# Patient Record
Sex: FEMALE | Race: WHITE | NOT HISPANIC OR LATINO | Employment: OTHER | URBAN - NONMETROPOLITAN AREA
[De-identification: names, ages, dates, MRNs, and addresses within clinical notes are randomized per-mention and may not be internally consistent; named-entity substitution may affect disease eponyms.]

---

## 2017-07-13 ENCOUNTER — TRANSFERRED RECORDS (OUTPATIENT)
Dept: HEALTH INFORMATION MANAGEMENT | Facility: HOSPITAL | Age: 82
End: 2017-07-13

## 2017-10-24 ENCOUNTER — TRANSFERRED RECORDS (OUTPATIENT)
Dept: HEALTH INFORMATION MANAGEMENT | Facility: HOSPITAL | Age: 82
End: 2017-10-24

## 2017-12-19 RX ORDER — PANTOPRAZOLE SODIUM 40 MG/1
40 TABLET, DELAYED RELEASE ORAL
Status: ON HOLD | COMMUNITY
End: 2019-12-21

## 2017-12-19 RX ORDER — FERROUS SULFATE 325(65) MG
325 TABLET ORAL
Status: ON HOLD | COMMUNITY
End: 2023-06-01

## 2017-12-19 RX ORDER — ATORVASTATIN CALCIUM 20 MG/1
20 TABLET, FILM COATED ORAL DAILY
Status: ON HOLD | COMMUNITY
End: 2022-11-26 | Stop reason: DRUGHIGH

## 2017-12-22 ENCOUNTER — ANESTHESIA EVENT (OUTPATIENT)
Dept: SURGERY | Facility: HOSPITAL | Age: 82
End: 2017-12-22
Payer: MEDICARE

## 2017-12-22 NOTE — ANESTHESIA PREPROCEDURE EVALUATION
Anesthesia Evaluation     . Pt has had prior anesthetic.     No history of anesthetic complications          ROS/MED HX    ENT/Pulmonary:     (+)sleep apnea, AMAURY risk factors snores loudly, hypertension, tobacco use, Past use asthma doesn't use CPAP , . .    Neurologic:  - neg neurologic ROS     Cardiovascular: Comment: Mi 2010  Carotid artery stenosis bilat    (+) Dyslipidemia, hypertension--CAD, --stent,2010  . : . . MOREIRA, . :. .       METS/Exercise Tolerance:  3 - Able to walk 1-2 blocks without stopping   Hematologic:     (+) Anemia, -      Musculoskeletal:  - neg musculoskeletal ROS       GI/Hepatic:     (+) GERD       Renal/Genitourinary:  - ROS Renal section negative       Endo:  - neg endo ROS       Psychiatric:  - neg psychiatric ROS       Infectious Disease:  - neg infectious disease ROS       Malignancy:      - no malignancy   Other:    - neg other ROS                 Physical Exam  Normal systems: cardiovascular and pulmonary    Airway   Mallampati: IV  TM distance: <3 FB  Neck ROM: limited    Dental   (+) upper dentures, partials and missing    Cardiovascular   Rhythm and rate: regular and normal      Pulmonary    breath sounds clear to auscultation                    Anesthesia Plan      History & Physical Review  History and physical reviewed and following examination; no interval change.    ASA Status:  3 .    NPO Status:  > 8 hours    Plan for MAC with Propofol and Intravenous induction. Maintenance will be TIVA.  Reason for MAC:  Deep or markedly invasive procedure (G8) and Procedure to face, neck, head or breast  PONV prophylaxis:  Ondansetron (or other 5HT-3)  Risks and benefits of MAC anesthetic discussed including dental damage, aspiration, loss of airway, conversion to general anesthetic, CV complications, MI, stroke, death. Pt wishes to proceed.       Postoperative Care  Postoperative pain management:  IV analgesics.      Consents  Anesthetic plan, risks, benefits and alternatives discussed  with:  Patient.  Use of blood products discussed: No .   .                          .

## 2017-12-27 ENCOUNTER — HOSPITAL ENCOUNTER (OUTPATIENT)
Facility: HOSPITAL | Age: 82
Discharge: HOME | End: 2017-12-27
Attending: INTERNAL MEDICINE | Admitting: INTERNAL MEDICINE
Payer: MEDICARE

## 2017-12-27 ENCOUNTER — ANESTHESIA (OUTPATIENT)
Dept: SURGERY | Facility: HOSPITAL | Age: 82
End: 2017-12-27
Payer: MEDICARE

## 2017-12-27 VITALS
OXYGEN SATURATION: 94 % | RESPIRATION RATE: 20 BRPM | WEIGHT: 128.8 LBS | SYSTOLIC BLOOD PRESSURE: 125 MMHG | HEART RATE: 75 BPM | TEMPERATURE: 97.5 F | HEIGHT: 59 IN | DIASTOLIC BLOOD PRESSURE: 71 MMHG | BODY MASS INDEX: 25.96 KG/M2

## 2017-12-27 PROCEDURE — 71000027 ZZH RECOVERY PHASE 2 EACH 15 MINS: Performed by: INTERNAL MEDICINE

## 2017-12-27 PROCEDURE — 40000306 ZZH STATISTIC PRE PROC ASSESS II: Performed by: INTERNAL MEDICINE

## 2017-12-27 PROCEDURE — 27210794 ZZH OR GENERAL SUPPLY STERILE: Performed by: INTERNAL MEDICINE

## 2017-12-27 PROCEDURE — 88305 TISSUE EXAM BY PATHOLOGIST: CPT | Mod: TC | Performed by: INTERNAL MEDICINE

## 2017-12-27 PROCEDURE — 25000128 H RX IP 250 OP 636: Performed by: NURSE ANESTHETIST, CERTIFIED REGISTERED

## 2017-12-27 PROCEDURE — 99100 ANES PT EXTEME AGE<1 YR&>70: CPT | Performed by: NURSE ANESTHETIST, CERTIFIED REGISTERED

## 2017-12-27 PROCEDURE — 36000050 ZZH SURGERY LEVEL 2 1ST 30 MIN: Performed by: INTERNAL MEDICINE

## 2017-12-27 PROCEDURE — 37000008 ZZH ANESTHESIA TECHNICAL FEE, 1ST 30 MIN: Performed by: INTERNAL MEDICINE

## 2017-12-27 PROCEDURE — 43239 EGD BIOPSY SINGLE/MULTIPLE: CPT | Performed by: NURSE ANESTHETIST, CERTIFIED REGISTERED

## 2017-12-27 PROCEDURE — 25000125 ZZHC RX 250: Performed by: INTERNAL MEDICINE

## 2017-12-27 RX ORDER — PROPOFOL 10 MG/ML
INJECTION, EMULSION INTRAVENOUS PRN
Status: DISCONTINUED | OUTPATIENT
Start: 2017-12-27 | End: 2017-12-28

## 2017-12-27 RX ORDER — SODIUM CHLORIDE, SODIUM LACTATE, POTASSIUM CHLORIDE, CALCIUM CHLORIDE 600; 310; 30; 20 MG/100ML; MG/100ML; MG/100ML; MG/100ML
INJECTION, SOLUTION INTRAVENOUS CONTINUOUS
Status: DISCONTINUED | OUTPATIENT
Start: 2017-12-27 | End: 2017-12-27 | Stop reason: HOSPADM

## 2017-12-27 RX ORDER — ALBUTEROL SULFATE 0.83 MG/ML
2.5 SOLUTION RESPIRATORY (INHALATION) EVERY 4 HOURS PRN
Status: DISCONTINUED | OUTPATIENT
Start: 2017-12-27 | End: 2017-12-27 | Stop reason: HOSPADM

## 2017-12-27 RX ORDER — ONDANSETRON 4 MG/1
4 TABLET, ORALLY DISINTEGRATING ORAL EVERY 30 MIN PRN
Status: DISCONTINUED | OUTPATIENT
Start: 2017-12-27 | End: 2017-12-27 | Stop reason: HOSPADM

## 2017-12-27 RX ORDER — NALOXONE HYDROCHLORIDE 0.4 MG/ML
.1-.4 INJECTION, SOLUTION INTRAMUSCULAR; INTRAVENOUS; SUBCUTANEOUS
Status: DISCONTINUED | OUTPATIENT
Start: 2017-12-27 | End: 2017-12-27 | Stop reason: HOSPADM

## 2017-12-27 RX ORDER — ONDANSETRON 2 MG/ML
4 INJECTION INTRAMUSCULAR; INTRAVENOUS EVERY 30 MIN PRN
Status: DISCONTINUED | OUTPATIENT
Start: 2017-12-27 | End: 2017-12-27 | Stop reason: HOSPADM

## 2017-12-27 RX ORDER — FENTANYL CITRATE 50 UG/ML
25-50 INJECTION, SOLUTION INTRAMUSCULAR; INTRAVENOUS
Status: DISCONTINUED | OUTPATIENT
Start: 2017-12-27 | End: 2017-12-27 | Stop reason: HOSPADM

## 2017-12-27 RX ORDER — MEPERIDINE HYDROCHLORIDE 25 MG/ML
12.5 INJECTION INTRAMUSCULAR; INTRAVENOUS; SUBCUTANEOUS
Status: DISCONTINUED | OUTPATIENT
Start: 2017-12-27 | End: 2017-12-27 | Stop reason: HOSPADM

## 2017-12-27 RX ORDER — LIDOCAINE 40 MG/G
CREAM TOPICAL
Status: DISCONTINUED | OUTPATIENT
Start: 2017-12-27 | End: 2017-12-27 | Stop reason: HOSPADM

## 2017-12-27 RX ORDER — METOPROLOL TARTRATE 50 MG
50 TABLET ORAL 2 TIMES DAILY
COMMUNITY
End: 2022-02-16

## 2017-12-27 RX ADMIN — PROPOFOL 20 MG: 10 INJECTION, EMULSION INTRAVENOUS at 13:21

## 2017-12-27 RX ADMIN — PROPOFOL 20 MG: 10 INJECTION, EMULSION INTRAVENOUS at 13:23

## 2017-12-27 RX ADMIN — PROPOFOL 20 MG: 10 INJECTION, EMULSION INTRAVENOUS at 13:19

## 2017-12-27 RX ADMIN — PROPOFOL 20 MG: 10 INJECTION, EMULSION INTRAVENOUS at 13:17

## 2017-12-27 RX ADMIN — PROPOFOL 10 MG: 10 INJECTION, EMULSION INTRAVENOUS at 13:25

## 2017-12-27 RX ADMIN — SODIUM CHLORIDE, POTASSIUM CHLORIDE, SODIUM LACTATE AND CALCIUM CHLORIDE: 600; 310; 30; 20 INJECTION, SOLUTION INTRAVENOUS at 12:38

## 2017-12-27 RX ADMIN — PROPOFOL 10 MG: 10 INJECTION, EMULSION INTRAVENOUS at 13:30

## 2017-12-27 RX ADMIN — PROPOFOL 20 MG: 10 INJECTION, EMULSION INTRAVENOUS at 13:15

## 2017-12-27 ASSESSMENT — LIFESTYLE VARIABLES: TOBACCO_USE: 1

## 2017-12-27 NOTE — OR NURSING
Pateint discharged to home .  Rajat score 20. Pain level 0/10.  Discharged from unit via walking .

## 2017-12-27 NOTE — BRIEF OP NOTE
[facility] Brief Op Note    Pre-operative diagnosis: IRON DEFICIENCY ANEMIA   Post-operative diagnosis Angioectasia of the small intestines   Procedure: Procedure(s):  UPPER ENDOSCOPY WITH BIOPSIES, ENTEROSCOPY WITH  SMALL BOWEL ARGON PLASMA COAGULATION - Wound Class: II-Clean Contaminated     Surgeon: Robin Vaughn MD   Anesthesia: Monitor Anesthesia Care    Total IV fluids: (See anesthesia record)   Drains: None   Specimens: Duodenal erosions   Findings: GERD, small intestinal angioectasia   Complications: None   Condition: Stable   Comments: See dictated operative report for full details     Robin Vaughn MD  12/27/2017  1:40 PM

## 2017-12-27 NOTE — H&P
Pre-Endoscopy History and Physical     Frances Gilliam MRN# [id number]   YOB: 1934 Age: 83 year old     Date of Procedure: 12/27/2017  Primary care provider: Theodore Lou  Type of Endoscopy: Esophagogastroduodenoscopy with possible biopsy, possible dilation, possible foreign body removal , Colonoscopy  Reason for Procedure: Anemia of bleeding, Upper Gastrointestinal bleeding, Polyps  Type of Anesthesia Anticipated: MAC    HPI:    Frances is a 83 year old female who will be undergoing the above procedure.      A history and physical has been performed. The patient's medications and allergies have been reviewed. The risks and benefits of the procedure and the sedation options and risks were discussed with the patient.  All questions were answered and informed consent was obtained.      She denies a personal or family history of anesthesia complications or bleeding disorders.     Patient Active Problem List   Diagnosis     Anemia        No past medical history on file.     No past surgical history on file.    Social History   Substance Use Topics     Smoking status: Not on file     Smokeless tobacco: Not on file     Alcohol use Not on file       No family history on file.    Prior to Admission medications    Medication Sig Start Date End Date Taking? Authorizing Provider   METOPROLOL TARTRATE PO Take 50 mg by mouth 2 times daily   Yes Reported, Patient   Calcium Carb-Cholecalciferol (CALCIUM PLUS VITAMIN D3 PO) Take 1 tablet by mouth 2 times daily   Yes Reported, Patient   ATORVASTATIN CALCIUM PO Take 20 mg by mouth daily   Yes Reported, Patient   PANTOPRAZOLE SODIUM PO Take 40 mg by mouth every morning (before breakfast)   Yes Reported, Patient   fluticasone-vilanterol (BREO ELLIPTA) 100-25 MCG/INH oral inhaler Inhale 1 puff into the lungs daily   Yes Reported, Patient   ferrous sulfate (IRON) 325 (65 FE) MG tablet Take 325 mg by mouth daily (with breakfast)   Yes Reported, Patient   ASPIRIN PO Take 81  "mg by mouth   Yes Reported, Patient   NONFORMULARY ICaps takes one capsule daily   Yes Reported, Patient   AMLODIPINE BESYLATE PO Take 5 mg by mouth daily   Yes Reported, Patient   TERAZOSIN HCL PO Take 10 mg by mouth once   Yes Reported, Patient   lactulose 20 GM/30ML SOLN Take 20 mLs by mouth 2 times daily as needed 10/7/16   Asad Layne MD   Nitroglycerin (NITROTAB SL) Place 0.4 mg under the tongue every 5 minutes as needed for chest pain    Reported, Patient       Allergies   Allergen Reactions     Niacin         REVIEW OF SYSTEMS:   5 point ROS negative except as noted above in HPI, including Gen., Resp., CV, GI &  system review.    PHYSICAL EXAM:   /75  Pulse 75  Temp 97.5  F (36.4  C) (Oral)  Resp 20  Ht 1.499 m (4' 11\")  Wt 58.4 kg (128 lb 12.8 oz)  SpO2 92%  BMI 26.01 kg/m2 Estimated body mass index is 26.01 kg/(m^2) as calculated from the following:    Height as of this encounter: 1.499 m (4' 11\").    Weight as of this encounter: 58.4 kg (128 lb 12.8 oz).   GENERAL APPEARANCE: alert, and oriented  MENTAL STATUS: alert  AIRWAY EXAM: Mallampatti Class II (visualization of the soft palate, fauces, and uvula)  RESP: lungs clear to auscultation - no rales, rhonchi or wheezes  CV: regular rates and rhythm  DIAGNOSTICS:    Not indicated    IMPRESSION   ASA Class 2 - Mild systemic disease    PLAN:   Plan for Colonoscopy with possible biopsy, possible polypectomy. We discussed the risks, benefits and alternatives and the patient wished to proceed.    The above has been forwarded to the consulting provider.      Signed Electronically by: Robin Vaughn  December 27, 2017          "

## 2017-12-27 NOTE — DISCHARGE INSTRUCTIONS
UPPER ENDOSCOPY    PURPOSE:  An Upper Endoscopy is a procedure in which the doctor passes a flexible, lighted tube called a gastroscope through your mouth into your stomach in order to:    See the lining of the esophagus, stomach, and duodenum (first part of the small intestine).    Look for bleeding, inflammation (swelling), abnormal tumors or tissue, or ulcers.    Take biopsy specimens.  (Biopsies do not hurt.)    TELL YOUR DOCTOR IF:     You have a heart condition, heart murmur, or have had heart valve surgery.    You have had angioplasty with stents put in within the last year.    You are taking blood thinners - Aspirin, Anti-inflammatory pain pills (like Motrin, ibuprofen, Naproxen, Feldene, Advil, etc.), Coumadin, Plavix.    You have diabetes - contact your regular doctor for management of your insulin.    YOU NEED TO:    Have someone drive you home.  You are not allowed to drive until the next day.  (If you take a taxi, the person staying with you after surgery must ride with you in the taxi.  The  is not responsible for you).    Have someone stay with you for four (4) hours after you leave the hospital.    Know the time to be at admitting:  A nurse will call you with the time the afternoon before your procedure.  If your procedure is on Monday, you will be called on Friday.  If you have not been contacted with the time, call the Admitting Department at [phone] or [phone], ext. [phone] after 5 pm (Admitting is open 24 hours daily).    Talk with the Surgery Patient Education Nurses.  Please call them @ [phone] or toll free [phone] after 8:00 a.m. Monday through Friday.    TO PREPARE FOR THE PROCEDURE:      ONE WEEK BEFORE:    Stop Aspirin, anti-inflammatory pain pills (Motrin, ibuprofen, Naproxen, Feldene, Advil, etc.).  It is OK to take Tylenol (acetaminophen).    Your doctor will tell you what to do if you are on Coumadin or Plavix.     NIGHT BEFORE:    Do not eat or drink  anything after midnight.  Your stomach needs to be empty.     DAY OF YOUR PROCEDURE:    Take your pills you were told to take.  Do not take diabetic pills.  If you are on Insulin, take the dose your doctor told you to take.    Wear loose, comfortable clothing and shoes.    Remove ALL jewelry including wedding rings.  Leave valuables at home.    Come to the hospital Admitting Department located at the [facility] on the lower level.    In Same Day surgery, you will be asked to change into an exam gown.    You will be asked your name, birth date, and what you are having done by every person who is involved with your care.    Your health history, medications, and allergies will be reviewed and verified.    An IV (intravenous line) will be started in your hand.  DURING THE UPPER ENDOSCOPY:    Your doctor and a registered nurse will be with you throughout the procedure which takes about 30 minutes.    You will either lie on your left side or on your back.    Medications will be given to you to help you relax and reduce the gag reflex when swallowing the scope.    Your doctor may need to insert air into your stomach to see better.  This may cause fullness or a cramping sensation.  The air will be removed at the end of the exam.    AFTER THE PROCEDURE    You will return to Same Day Surgery to rest for about an hour before you go home.    The doctor will talk with you and your family.    A family member/friend may visit with you.    You may burp up any air remaining in your stomach.    You may feel dizzy or light-headed from the medicine.    Your nurse will go over the discharge instructions with you and your caregiver and answer any of your questions.      You will be contacted the next day to check on how you are doing.    If biopsies were taken, you will be contacted with the results usually within 3 days.  BACK AT HOME    Rest for an hour or two after you get home.    When your throat is no longer numb and you have a  gag reflex, take a few sips of cool water.  If you can swallow comfortably, you may start eating again.    You may have a mild sore throat for the rest of the day.  WHAT TO WATCH FOR:  Problems rarely occur after the exam, but it is important to be aware of the early signs of a complication.  Call your doctor immediately if you have:    Difficulty swallowing or breathing    Unusual pain in your stomach or chest    Vomiting blood or dark material that looks like coffee grounds    Black or tarry stools    Temperature over 100.6 degrees    MORE QUESTIONS?  Please ask your doctor or nurse before the exam begins  or call your doctor at the clinic.    IF YOU MUST CANCEL YOUR PROCEDURE THE EVENING/NIGHT BEFORE, PLEASE CALL HOSPITAL ADMITTING -[phone] OR TOLL FREE [phone], [phone]. [phone].    Phone Numbers:  Hospital - [phone] [phone]  [facility] - [phone]  Surgery Patient Education - [phone] or toll free [phone]    Post-Anesthesia Patient Instructions    IMMEDIATELY FOLLOWING SURGERY:  Do not drive or operate machinery for the first twenty four hours after surgery.  Do not make any important decisions for twenty four hours after surgery or while taking narcotic pain medications or sedatives.  If you develop intractable nausea and vomiting or a severe headache please notify your doctor immediately.    FOLLOW-UP:  Please make an appointment with your surgeon as instructed. You do not need to follow up with anesthesia unless specifically instructed to do so.    WOUND CARE INSTRUCTIONS (if applicable):  Keep a dry clean dressing on the anesthesia/puncture wound site if there is drainage.  Once the wound has quit draining you may leave it open to air.  Generally you should leave the bandage intact for twenty four hours unless there is drainage.  If the epidural site drains for more than 36-48 hours please call the anesthesia department.    QUESTIONS?:  Please feel free to call your  physician or the hospital  if you have any questions, and they will be happy to assist you.

## 2017-12-27 NOTE — ANESTHESIA POSTPROCEDURE EVALUATION
Patient: Frances Gilliam    Procedure(s):  UPPER ENDOSCOPY WITH BIOPSIES, ENTEROSCOPY WITH  SMALL BOWEL ARGON PLASMA COAGULATION - Wound Class: II-Clean Contaminated      Diagnosis:IRON DEFICIENCY ANEMIA  Diagnosis Additional Information: No value filed.    Anesthesia Type:  MAC    Note:  Anesthesia Post Evaluation    Patient location during evaluation: Phase 2  Patient participation: Able to fully participate in evaluation  Level of consciousness: awake  Pain management: adequate  Airway patency: patent  Cardiovascular status: acceptable  Respiratory status: acceptable  Hydration status: acceptable  PONV: none     Anesthetic complications: None          Last vitals:  Vitals:    12/27/17 1345 12/27/17 1350 12/27/17 1355   BP: 108/61 111/64 124/64   Pulse:      Resp:      Temp:      SpO2: 92% 92% 93%         Electronically Signed By: DARIELA Helton CRNA  December 27, 2017  2:00 PM

## 2017-12-27 NOTE — IP AVS SNAPSHOT
[facility]/Phase II    [address] [address]    Phone:  [phone]                                       After Visit Summary   12/27/2017    Frances Gilliam    MRN: [id number]           After Visit Summary Signature Page     I have received my discharge instructions, and my questions have been answered. I have discussed any challenges I see with this plan with the nurse or doctor.    ..........................................................................................................................................  Patient/Patient Representative Signature      ..........................................................................................................................................  Patient Representative Print Name and Relationship to Patient    ..................................................               ................................................  Date                                            Time    ..........................................................................................................................................  Reviewed by Signature/Title    ...................................................              ..............................................  Date                                                            Time

## 2017-12-27 NOTE — IP AVS SNAPSHOT
MRN:[id number]                      After Visit Summary   12/27/2017    Frances Gilliam    MRN: [id number]           Thank you!     Thank you for choosing [facility] for your care. Our goal is always to provide you with excellent care. Hearing back from our patients is one way we can continue to improve our services. Please take a few minutes to complete the written survey that you may receive in the mail after you visit with us. Thank you!        Patient Information     Date Of Birth          4/4/1934        About your hospital stay     You were admitted on:  December 27, 2017 You last received care in the:  [facility] Preop/Phase II    You were discharged on:  December 27, 2017       Who to Call     For medical emergencies, please call 911.  For non-urgent questions about your medical care, please call your primary care provider or clinic, [phone]  For questions related to your surgery, please call your surgery clinic        Attending Provider     Provider Specialty    Robin Vaughn MD Gastroenterology       Primary Care Provider Office Phone # Fax #    Theodore THOMPSON DO [facility] [phone]      Further instructions from your care team           UPPER ENDOSCOPY    PURPOSE:  An Upper Endoscopy is a procedure in which the doctor passes a flexible, lighted tube called a gastroscope through your mouth into your stomach in order to:    See the lining of the esophagus, stomach, and duodenum (first part of the small intestine).    Look for bleeding, inflammation (swelling), abnormal tumors or tissue, or ulcers.    Take biopsy specimens.  (Biopsies do not hurt.)    TELL YOUR DOCTOR IF:     You have a heart condition, heart murmur, or have had heart valve surgery.    You have had angioplasty with stents put in within the last year.    You are taking blood thinners - Aspirin, Anti-inflammatory pain pills (like Motrin, ibuprofen, Naproxen, Feldene, Advil, etc.), Coumadin, Plavix.    You have diabetes -  contact your regular doctor for management of your insulin.    YOU NEED TO:    Have someone drive you home.  You are not allowed to drive until the next day.  (If you take a taxi, the person staying with you after surgery must ride with you in the taxi.  The  is not responsible for you).    Have someone stay with you for four (4) hours after you leave the hospital.    Know the time to be at admitting:  A nurse will call you with the time the afternoon before your procedure.  If your procedure is on Monday, you will be called on Friday.  If you have not been contacted with the time, call the Admitting Department at [phone] or [phone], ext. [phone] after 5 pm (Admitting is open 24 hours daily).    Talk with the Surgery Patient Education Nurses.  Please call them @ [phone] or toll free [phone] after 8:00 a.m. Monday through Friday.    TO PREPARE FOR THE PROCEDURE:      ONE WEEK BEFORE:    Stop Aspirin, anti-inflammatory pain pills (Motrin, ibuprofen, Naproxen, Feldene, Advil, etc.).  It is OK to take Tylenol (acetaminophen).    Your doctor will tell you what to do if you are on Coumadin or Plavix.     NIGHT BEFORE:    Do not eat or drink anything after midnight.  Your stomach needs to be empty.     DAY OF YOUR PROCEDURE:    Take your pills you were told to take.  Do not take diabetic pills.  If you are on Insulin, take the dose your doctor told you to take.    Wear loose, comfortable clothing and shoes.    Remove ALL jewelry including wedding rings.  Leave valuables at home.    Come to the hospital Admitting Department located at the [facility] on the lower level.    In Same Day surgery, you will be asked to change into an exam gown.    You will be asked your name, birth date, and what you are having done by every person who is involved with your care.    Your health history, medications, and allergies will be reviewed and verified.    An IV (intravenous line) will be started in your  hand.  DURING THE UPPER ENDOSCOPY:    Your doctor and a registered nurse will be with you throughout the procedure which takes about 30 minutes.    You will either lie on your left side or on your back.    Medications will be given to you to help you relax and reduce the gag reflex when swallowing the scope.    Your doctor may need to insert air into your stomach to see better.  This may cause fullness or a cramping sensation.  The air will be removed at the end of the exam.    AFTER THE PROCEDURE    You will return to Same Day Surgery to rest for about an hour before you go home.    The doctor will talk with you and your family.    A family member/friend may visit with you.    You may burp up any air remaining in your stomach.    You may feel dizzy or light-headed from the medicine.    Your nurse will go over the discharge instructions with you and your caregiver and answer any of your questions.      You will be contacted the next day to check on how you are doing.    If biopsies were taken, you will be contacted with the results usually within 3 days.  BACK AT HOME    Rest for an hour or two after you get home.    When your throat is no longer numb and you have a gag reflex, take a few sips of cool water.  If you can swallow comfortably, you may start eating again.    You may have a mild sore throat for the rest of the day.  WHAT TO WATCH FOR:  Problems rarely occur after the exam, but it is important to be aware of the early signs of a complication.  Call your doctor immediately if you have:    Difficulty swallowing or breathing    Unusual pain in your stomach or chest    Vomiting blood or dark material that looks like coffee grounds    Black or tarry stools    Temperature over 100.6 degrees    MORE QUESTIONS?  Please ask your doctor or nurse before the exam begins  or call your doctor at the clinic.    IF YOU MUST CANCEL YOUR PROCEDURE THE EVENING/NIGHT BEFORE, PLEASE CALL HOSPITAL ADMITTING -[phone] OR  "TOLL FREE [phone], EXT. [phone].    Phone Numbers:  [facility] - [phone] [phone]  [facility] - [phone]  Surgery Patient Education - [phone] or toll free [phone]    Post-Anesthesia Patient Instructions    IMMEDIATELY FOLLOWING SURGERY:  Do not drive or operate machinery for the first twenty four hours after surgery.  Do not make any important decisions for twenty four hours after surgery or while taking narcotic pain medications or sedatives.  If you develop intractable nausea and vomiting or a severe headache please notify your doctor immediately.    FOLLOW-UP:  Please make an appointment with your surgeon as instructed. You do not need to follow up with anesthesia unless specifically instructed to do so.    WOUND CARE INSTRUCTIONS (if applicable):  Keep a dry clean dressing on the anesthesia/puncture wound site if there is drainage.  Once the wound has quit draining you may leave it open to air.  Generally you should leave the bandage intact for twenty four hours unless there is drainage.  If the epidural site drains for more than 36-48 hours please call the anesthesia department.    QUESTIONS?:  Please feel free to call your physician or the hospital  if you have any questions, and they will be happy to assist you.       Pending Results     No orders found from 12/25/2017 to 12/28/2017.            Admission Information     Date & Time Provider Department Dept. Phone    12/27/2017 Robin Vaughn MD HI Preop/Phase -[phone]      Your Vitals Were     Blood Pressure Pulse Temperature Respirations Height Weight    107/67 75 97.5  F (36.4  C) (Oral) 20 1.499 m (4' 11\") 58.4 kg (128 lb 12.8 oz)    Pulse Oximetry BMI (Body Mass Index)                92% 26.01 kg/m2          [employer] Information     [employer] lets you send messages to your doctor, view your test results, renew your prescriptions, schedule appointments and more. To sign up, go to [email or web address] . Click " "on \"Log in\" on the left side of the screen, which will take you to the Welcome page. Then click on \"Sign up Now\" on the right side of the page.     You will be asked to enter the access code listed below, as well as some personal information. Please follow the directions to create your username and password.     Your access code is: [email or web address]  Expires: 3/27/2018  1:45 PM     Your access code will  in 90 days. If you need help or a new code, please call your [facility] clinic or [phone].        Care EveryWhere ID     This is your Care EveryWhere ID. This could be used by other organizations to access your [facility] medical records  [id number]        Equal Access to Services     RYDER ESCUDERO : Paola Nieto, wahelder dickinson, qayblashawn kaalkyle lopez, [place] . So [facility] [phone].    ATENCIÓN: Si habla español, tiene a yeboah disposición servicios gratuitos de asistencia lingüística. Llame al [phone].    We comply with applicable federal civil rights laws and [place] laws. We do not discriminate on the basis of race, color, national origin, age, disability, sex, sexual orientation, or gender identity.               Review of your medicines      CONTINUE these medicines which have NOT CHANGED        Dose / Directions    AMLODIPINE BESYLATE PO        Dose:  5 mg   Take 5 mg by mouth daily   Refills:  0       ASPIRIN PO        Dose:  81 mg   Take 81 mg by mouth   Refills:  0       ATORVASTATIN CALCIUM PO        Dose:  20 mg   Take 20 mg by mouth daily   Refills:  0       BREO ELLIPTA 100-25 MCG/INH oral inhaler   Generic drug:  fluticasone-vilanterol        Dose:  1 puff   Inhale 1 puff into the lungs daily   Refills:  0       CALCIUM PLUS VITAMIN D3 PO        Dose:  1 tablet   Take 1 tablet by mouth 2 times daily   Refills:  0       ferrous sulfate 325 (65 FE) MG tablet   Commonly known as:  IRON        Dose:  325 mg   Take 325 mg by mouth daily (with " breakfast)   Refills:  0       lactulose 20 GM/30ML Soln        Dose:  20 mL   Take 20 mLs by mouth 2 times daily as needed   Quantity:  500 mL   Refills:  0       METOPROLOL TARTRATE PO        Dose:  50 mg   Take 50 mg by mouth 2 times daily   Refills:  0       NITROTAB SL        Dose:  0.4 mg   Place 0.4 mg under the tongue every 5 minutes as needed for chest pain   Refills:  0       NONFORMULARY        ICaps takes one capsule daily   Refills:  0       PANTOPRAZOLE SODIUM PO        Dose:  40 mg   Take 40 mg by mouth every morning (before breakfast)   Refills:  0       TERAZOSIN HCL PO        Dose:  10 mg   Take 10 mg by mouth once   Refills:  0                Protect others around you: Learn how to safely use, store and throw away your medicines at www.disposemymeds.org.             Medication List: This is a list of all your medications and when to take them. Check marks below indicate your daily home schedule. Keep this list as a reference.      Medications           Morning Afternoon Evening Bedtime As Needed    AMLODIPINE BESYLATE PO   Take 5 mg by mouth daily                                ASPIRIN PO   Take 81 mg by mouth                                ATORVASTATIN CALCIUM PO   Take 20 mg by mouth daily                                BREO ELLIPTA 100-25 MCG/INH oral inhaler   Inhale 1 puff into the lungs daily   Generic drug:  fluticasone-vilanterol                                CALCIUM PLUS VITAMIN D3 PO   Take 1 tablet by mouth 2 times daily                                ferrous sulfate 325 (65 FE) MG tablet   Commonly known as:  IRON   Take 325 mg by mouth daily (with breakfast)                                lactulose 20 GM/30ML Soln   Take 20 mLs by mouth 2 times daily as needed                                METOPROLOL TARTRATE PO   Take 50 mg by mouth 2 times daily                                NITROTAB SL   Place 0.4 mg under the tongue every 5 minutes as needed for chest pain                                 NONFORMULARY   ICaps takes one capsule daily                                PANTOPRAZOLE SODIUM PO   Take 40 mg by mouth every morning (before breakfast)                                TERAZOSIN HCL PO   Take 10 mg by mouth once

## 2017-12-28 NOTE — ANESTHESIA CARE TRANSFER NOTE
Patient: Frances Gillima    Procedure(s):  UPPER ENDOSCOPY WITH BIOPSIES, ENTEROSCOPY WITH  SMALL BOWEL ARGON PLASMA COAGULATION - Wound Class: II-Clean Contaminated      Diagnosis: IRON DEFICIENCY ANEMIA  Diagnosis Additional Information: No value filed.    Anesthesia Type:   MAC     Note:  Airway :Nasal Cannula  Patient transferred to:Phase II  Handoff Report: Identifed the Patient, Identified the Reponsible Provider, Reviewed the pertinent medical history, Discussed the surgical course, Reviewed Intra-OP anesthesia mangement and issues during anesthesia, Set expectations for post-procedure period and Allowed opportunity for questions and acknowledgement of understanding      Vitals: (Last set prior to Anesthesia Care Transfer)    CRNA VITALS  12/27/2017 1305 - 12/27/2017 1405      12/27/2017             Pulse: 65    Ht Rate: 67    SpO2: 93 %                Electronically Signed By: DARIELA Preston CRNA  December 28, 2017  11:40 AM

## 2017-12-28 NOTE — OP NOTE
DATE OF SERVICE:  2017      PROCEDURE:  Enteroscopy to the jejunum with multiple APCs of angiodysplasia/angioectasia.      INDICATIONS:     1.  Iron deficiency anemia.   2.  History of GI bleeding.      HISTORY OF PRESENT ILLNESS:  Please refer to H&P for further details.      PHYSICAL EXAMINATION:  Cardiopulmonary examination unchanged from previous exam.  Please refer to the H&P for further details.      MEDICATIONS:  MAC per Anesthesia Service.  Monitored parameters within normal limits throughout.      DESCRIPTION OF PROCEDURE:  After informed consent was obtained from Frances Gilliam, a pediatric colonoscope was advanced all the way to the jejunal crest.  Slow withdrawal with some suctioning revealed small patches of angiectasias on the walls of the small bowel.  An APC was used to fulgurate all of the angiodysplastic spots.  Some of the vascular sites started bleeding which were easily rectified with APC.  In the duodenal bulb, there were shallow erosions that were biopsied.  Turning our attention to the stomach, I noted significantly improved gastric mucosa with peptic gastropathy.  A retroflexion view was positive for a small hiatal hernia.  The distal esophagus had a quite passable and patent Schatzki.  The air was then sufflated and the scope was withdrawn fully and completely without any complications.      POSTPROCEDURE DIAGNOSES:   1.  Multiple small bowel angiodysplasias/angioectasias.   2.  Small hiatal hernia.   3.  Shallow erosions/ulcers in the duodenum and the duodenal bulb.   4.  Small Schatzki that was quite patent.      RECOMMENDATIONS:   1.  Repeat enteroscopy in 1 year.   2.  Follow the hemoglobin.   3.  Gastrointestinal clinic followup in 6 months.      Thank you for allowing us to participate in her management.         ROSY MOSS MD             D: 2017 13:46   T: 2017 06:03   MT: CHRISTIANNE      Name:     FRANCES GILLIAM   MRN:      [id number]        Account:        [id number]   :       04/04/1934           Procedure Date: 12/27/2017      Document: [id number]       cc: Theodore Lou DO

## 2017-12-28 NOTE — ADDENDUM NOTE
Addendum  created 12/28/17 1141 by Jonathan Chapman APRN CRNA    Anesthesia Event edited, Procedure Event Log accessed, Sign clinical note

## 2017-12-29 LAB — COPATH REPORT: NORMAL

## 2018-01-03 NOTE — ADDENDUM NOTE
Addendum  created 01/03/18 0814 by Jonathan Chapman APRN CRNA    Anesthesia Event edited, Anesthesia Staff edited, Procedure Event Log accessed

## 2018-03-13 ENCOUNTER — DOCUMENTATION ONLY (OUTPATIENT)
Dept: OTHER | Facility: CLINIC | Age: 83
End: 2018-03-13

## 2018-03-13 PROBLEM — Z71.89 ADVANCED DIRECTIVES, COUNSELING/DISCUSSION: Chronic | Status: ACTIVE | Noted: 2018-03-13

## 2019-05-22 ENCOUNTER — DOCUMENTATION ONLY (OUTPATIENT)
Dept: OTHER | Facility: CLINIC | Age: 84
End: 2019-05-22

## 2019-05-22 PROBLEM — Z71.89 ADVANCED DIRECTIVES, COUNSELING/DISCUSSION: Chronic | Status: RESOLVED | Noted: 2018-03-13 | Resolved: 2019-05-22

## 2019-12-21 ENCOUNTER — APPOINTMENT (OUTPATIENT)
Dept: GENERAL RADIOLOGY | Facility: HOSPITAL | Age: 84
DRG: 185 | End: 2019-12-21
Attending: EMERGENCY MEDICINE
Payer: MEDICARE

## 2019-12-21 ENCOUNTER — HOSPITAL ENCOUNTER (INPATIENT)
Facility: HOSPITAL | Age: 84
LOS: 2 days | Discharge: HOME | DRG: 185 | End: 2019-12-24
Attending: EMERGENCY MEDICINE | Admitting: HOSPITALIST
Payer: MEDICARE

## 2019-12-21 ENCOUNTER — APPOINTMENT (OUTPATIENT)
Dept: CT IMAGING | Facility: HOSPITAL | Age: 84
DRG: 185 | End: 2019-12-21
Attending: EMERGENCY MEDICINE
Payer: MEDICARE

## 2019-12-21 DIAGNOSIS — R09.02 HYPOXIA: ICD-10-CM

## 2019-12-21 DIAGNOSIS — R55 SYNCOPE, UNSPECIFIED SYNCOPE TYPE: ICD-10-CM

## 2019-12-21 DIAGNOSIS — S22.42XA CLOSED FRACTURE OF MULTIPLE RIBS OF LEFT SIDE, INITIAL ENCOUNTER: Primary | ICD-10-CM

## 2019-12-21 PROBLEM — H35.30 MACULAR DEGENERATION: Status: ACTIVE | Noted: 2019-12-21

## 2019-12-21 PROBLEM — S22.49XA MULTIPLE RIB FRACTURES: Status: ACTIVE | Noted: 2019-12-21

## 2019-12-21 PROBLEM — M94.9 DISORDER OF BONE AND CARTILAGE: Status: ACTIVE | Noted: 2019-12-21

## 2019-12-21 PROBLEM — I25.10 CORONARY ATHEROSCLEROSIS: Status: ACTIVE | Noted: 2019-12-21

## 2019-12-21 PROBLEM — I65.29 OCCLUSION AND STENOSIS OF CAROTID ARTERY WITHOUT MENTION OF CEREBRAL INFARCTION: Status: ACTIVE | Noted: 2019-12-21

## 2019-12-21 PROBLEM — M89.9 DISORDER OF BONE AND CARTILAGE: Status: ACTIVE | Noted: 2019-12-21

## 2019-12-21 LAB
ALBUMIN SERPL-MCNC: 3.9 G/DL (ref 3.4–5)
ALBUMIN UR-MCNC: 10 MG/DL
ALP SERPL-CCNC: 102 U/L (ref 40–150)
ALT SERPL W P-5'-P-CCNC: 34 U/L (ref 0–50)
ANION GAP SERPL CALCULATED.3IONS-SCNC: 7 MMOL/L (ref 3–14)
APPEARANCE UR: CLEAR
AST SERPL W P-5'-P-CCNC: 24 U/L (ref 0–45)
BACTERIA #/AREA URNS HPF: ABNORMAL /HPF
BASOPHILS # BLD AUTO: 0 10E9/L (ref 0–0.2)
BASOPHILS NFR BLD AUTO: 0.2 %
BILIRUB SERPL-MCNC: 0.7 MG/DL (ref 0.2–1.3)
BILIRUB UR QL STRIP: NEGATIVE
BUN SERPL-MCNC: 20 MG/DL (ref 7–30)
CALCIUM SERPL-MCNC: 9.3 MG/DL (ref 8.5–10.1)
CHLORIDE SERPL-SCNC: 104 MMOL/L (ref 94–109)
CO2 SERPL-SCNC: 27 MMOL/L (ref 20–32)
COLOR UR AUTO: ABNORMAL
CREAT SERPL-MCNC: 0.78 MG/DL (ref 0.52–1.04)
CRP SERPL-MCNC: <2.9 MG/L (ref 0–8)
D DIMER PPP FEU-MCNC: 3.2 UG/ML FEU (ref 0–0.5)
DIFFERENTIAL METHOD BLD: NORMAL
EOSINOPHIL # BLD AUTO: 0.1 10E9/L (ref 0–0.7)
EOSINOPHIL NFR BLD AUTO: 1.2 %
ERYTHROCYTE [DISTWIDTH] IN BLOOD BY AUTOMATED COUNT: 12.6 % (ref 10–15)
GFR SERPL CREATININE-BSD FRML MDRD: 70 ML/MIN/{1.73_M2}
GLUCOSE SERPL-MCNC: 122 MG/DL (ref 70–99)
GLUCOSE UR STRIP-MCNC: NEGATIVE MG/DL
HCT VFR BLD AUTO: 42 % (ref 35–47)
HGB BLD-MCNC: 14.3 G/DL (ref 11.7–15.7)
HGB UR QL STRIP: NEGATIVE
IMM GRANULOCYTES # BLD: 0.1 10E9/L (ref 0–0.4)
IMM GRANULOCYTES NFR BLD: 1.1 %
KETONES UR STRIP-MCNC: 5 MG/DL
LACTATE BLD-SCNC: 1.4 MMOL/L (ref 0.7–2)
LEUKOCYTE ESTERASE UR QL STRIP: ABNORMAL
LYMPHOCYTES # BLD AUTO: 2 10E9/L (ref 0.8–5.3)
LYMPHOCYTES NFR BLD AUTO: 23.9 %
MCH RBC QN AUTO: 32.2 PG (ref 26.5–33)
MCHC RBC AUTO-ENTMCNC: 34 G/DL (ref 31.5–36.5)
MCV RBC AUTO: 95 FL (ref 78–100)
MONOCYTES # BLD AUTO: 0.4 10E9/L (ref 0–1.3)
MONOCYTES NFR BLD AUTO: 5.2 %
MUCOUS THREADS #/AREA URNS LPF: PRESENT /LPF
NEUTROPHILS # BLD AUTO: 5.8 10E9/L (ref 1.6–8.3)
NEUTROPHILS NFR BLD AUTO: 68.4 %
NITRATE UR QL: NEGATIVE
NRBC # BLD AUTO: 0 10*3/UL
NRBC BLD AUTO-RTO: 0 /100
NT-PROBNP SERPL-MCNC: 396 PG/ML (ref 0–1800)
PH UR STRIP: 6 PH (ref 4.7–8)
PLATELET # BLD AUTO: 174 10E9/L (ref 150–450)
POTASSIUM SERPL-SCNC: 4.1 MMOL/L (ref 3.4–5.3)
PROT SERPL-MCNC: 7.3 G/DL (ref 6.8–8.8)
RBC # BLD AUTO: 4.44 10E12/L (ref 3.8–5.2)
RBC #/AREA URNS AUTO: 4 /HPF (ref 0–2)
SODIUM SERPL-SCNC: 138 MMOL/L (ref 133–144)
SOURCE: ABNORMAL
SP GR UR STRIP: 1.01 (ref 1–1.03)
SQUAMOUS #/AREA URNS AUTO: 10 /HPF (ref 0–1)
TROPONIN I SERPL-MCNC: <0.015 UG/L (ref 0–0.04)
TROPONIN I SERPL-MCNC: <0.015 UG/L (ref 0–0.04)
UROBILINOGEN UR STRIP-MCNC: NORMAL MG/DL (ref 0–2)
WBC # BLD AUTO: 8.5 10E9/L (ref 4–11)
WBC #/AREA URNS AUTO: 3 /HPF (ref 0–5)

## 2019-12-21 PROCEDURE — 40000275 ZZH STATISTIC RCP TIME EA 10 MIN

## 2019-12-21 PROCEDURE — 71101 X-RAY EXAM UNILAT RIBS/CHEST: CPT | Mod: TC,LT

## 2019-12-21 PROCEDURE — 25000128 H RX IP 250 OP 636: Performed by: EMERGENCY MEDICINE

## 2019-12-21 PROCEDURE — 99223 1ST HOSP IP/OBS HIGH 75: CPT | Mod: AI | Performed by: NURSE PRACTITIONER

## 2019-12-21 PROCEDURE — 85379 FIBRIN DEGRADATION QUANT: CPT | Performed by: EMERGENCY MEDICINE

## 2019-12-21 PROCEDURE — 99285 EMERGENCY DEPT VISIT HI MDM: CPT | Mod: 25

## 2019-12-21 PROCEDURE — 99223 1ST HOSP IP/OBS HIGH 75: CPT | Performed by: SURGERY

## 2019-12-21 PROCEDURE — 25000132 ZZH RX MED GY IP 250 OP 250 PS 637: Performed by: NURSE PRACTITIONER

## 2019-12-21 PROCEDURE — 81001 URINALYSIS AUTO W/SCOPE: CPT | Performed by: NURSE PRACTITIONER

## 2019-12-21 PROCEDURE — 93005 ELECTROCARDIOGRAM TRACING: CPT

## 2019-12-21 PROCEDURE — 25500064 ZZH RX 255 OP 636: Performed by: EMERGENCY MEDICINE

## 2019-12-21 PROCEDURE — 83880 ASSAY OF NATRIURETIC PEPTIDE: CPT | Performed by: EMERGENCY MEDICINE

## 2019-12-21 PROCEDURE — 96374 THER/PROPH/DIAG INJ IV PUSH: CPT

## 2019-12-21 PROCEDURE — 25800030 ZZH RX IP 258 OP 636: Performed by: NURSE PRACTITIONER

## 2019-12-21 PROCEDURE — 70450 CT HEAD/BRAIN W/O DYE: CPT | Mod: TC

## 2019-12-21 PROCEDURE — 93010 ELECTROCARDIOGRAM REPORT: CPT | Performed by: INTERNAL MEDICINE

## 2019-12-21 PROCEDURE — 36415 COLL VENOUS BLD VENIPUNCTURE: CPT | Performed by: NURSE PRACTITIONER

## 2019-12-21 PROCEDURE — 84484 ASSAY OF TROPONIN QUANT: CPT | Performed by: NURSE PRACTITIONER

## 2019-12-21 PROCEDURE — 80053 COMPREHEN METABOLIC PANEL: CPT | Performed by: EMERGENCY MEDICINE

## 2019-12-21 PROCEDURE — 83605 ASSAY OF LACTIC ACID: CPT | Performed by: EMERGENCY MEDICINE

## 2019-12-21 PROCEDURE — 99285 EMERGENCY DEPT VISIT HI MDM: CPT | Mod: Z6 | Performed by: EMERGENCY MEDICINE

## 2019-12-21 PROCEDURE — G0378 HOSPITAL OBSERVATION PER HR: HCPCS

## 2019-12-21 PROCEDURE — 84484 ASSAY OF TROPONIN QUANT: CPT | Performed by: EMERGENCY MEDICINE

## 2019-12-21 PROCEDURE — 94375 RESPIRATORY FLOW VOLUME LOOP: CPT

## 2019-12-21 PROCEDURE — 85025 COMPLETE CBC W/AUTO DIFF WBC: CPT | Performed by: EMERGENCY MEDICINE

## 2019-12-21 PROCEDURE — 86140 C-REACTIVE PROTEIN: CPT | Performed by: EMERGENCY MEDICINE

## 2019-12-21 PROCEDURE — 40000786 ZZHCL STATISTIC ACTIVE MRSA SURVEILLANCE CULTURE: Performed by: NURSE PRACTITIONER

## 2019-12-21 PROCEDURE — 71275 CT ANGIOGRAPHY CHEST: CPT | Mod: TC

## 2019-12-21 RX ORDER — KETOROLAC TROMETHAMINE 15 MG/ML
15 INJECTION, SOLUTION INTRAMUSCULAR; INTRAVENOUS ONCE
Status: COMPLETED | OUTPATIENT
Start: 2019-12-21 | End: 2019-12-21

## 2019-12-21 RX ORDER — METOPROLOL TARTRATE 50 MG
50 TABLET ORAL 2 TIMES DAILY
Status: DISCONTINUED | OUTPATIENT
Start: 2019-12-21 | End: 2019-12-24 | Stop reason: HOSPADM

## 2019-12-21 RX ORDER — ONDANSETRON 2 MG/ML
4 INJECTION INTRAMUSCULAR; INTRAVENOUS EVERY 6 HOURS PRN
Status: DISCONTINUED | OUTPATIENT
Start: 2019-12-21 | End: 2019-12-24 | Stop reason: HOSPADM

## 2019-12-21 RX ORDER — ASPIRIN 81 MG/1
81 TABLET ORAL EVERY MORNING
COMMUNITY

## 2019-12-21 RX ORDER — HYDROCODONE BITARTRATE AND ACETAMINOPHEN 5; 325 MG/1; MG/1
1-2 TABLET ORAL EVERY 4 HOURS PRN
Status: DISCONTINUED | OUTPATIENT
Start: 2019-12-21 | End: 2019-12-23

## 2019-12-21 RX ORDER — LIDOCAINE 4 G/G
1 PATCH TOPICAL
Status: DISCONTINUED | OUTPATIENT
Start: 2019-12-21 | End: 2019-12-24 | Stop reason: HOSPADM

## 2019-12-21 RX ORDER — HYDROMORPHONE HYDROCHLORIDE 1 MG/ML
0.3 INJECTION, SOLUTION INTRAMUSCULAR; INTRAVENOUS; SUBCUTANEOUS
Status: DISCONTINUED | OUTPATIENT
Start: 2019-12-21 | End: 2019-12-24 | Stop reason: HOSPADM

## 2019-12-21 RX ORDER — LIDOCAINE 40 MG/G
CREAM TOPICAL
Status: DISCONTINUED | OUTPATIENT
Start: 2019-12-21 | End: 2019-12-24 | Stop reason: HOSPADM

## 2019-12-21 RX ORDER — MULTIVITAMIN WITH IRON
100 TABLET ORAL DAILY
Status: ON HOLD | COMMUNITY
End: 2019-12-21

## 2019-12-21 RX ORDER — ACETAMINOPHEN 325 MG/1
650 TABLET ORAL EVERY 4 HOURS PRN
Status: DISCONTINUED | OUTPATIENT
Start: 2019-12-21 | End: 2019-12-23

## 2019-12-21 RX ORDER — DOCUSATE SODIUM 100 MG/1
100 CAPSULE, LIQUID FILLED ORAL 2 TIMES DAILY
Status: DISCONTINUED | OUTPATIENT
Start: 2019-12-21 | End: 2019-12-24 | Stop reason: HOSPADM

## 2019-12-21 RX ORDER — AMLODIPINE BESYLATE 5 MG/1
5 TABLET ORAL DAILY
Status: DISCONTINUED | OUTPATIENT
Start: 2019-12-22 | End: 2019-12-24 | Stop reason: HOSPADM

## 2019-12-21 RX ORDER — ASPIRIN 81 MG/1
81 TABLET ORAL DAILY
Status: DISCONTINUED | OUTPATIENT
Start: 2019-12-22 | End: 2019-12-24 | Stop reason: HOSPADM

## 2019-12-21 RX ORDER — NALOXONE HYDROCHLORIDE 0.4 MG/ML
.1-.4 INJECTION, SOLUTION INTRAMUSCULAR; INTRAVENOUS; SUBCUTANEOUS
Status: DISCONTINUED | OUTPATIENT
Start: 2019-12-21 | End: 2019-12-24 | Stop reason: HOSPADM

## 2019-12-21 RX ORDER — ACETAMINOPHEN 650 MG/1
650 SUPPOSITORY RECTAL EVERY 4 HOURS PRN
Status: DISCONTINUED | OUTPATIENT
Start: 2019-12-21 | End: 2019-12-23

## 2019-12-21 RX ORDER — ONDANSETRON 4 MG/1
4 TABLET, ORALLY DISINTEGRATING ORAL EVERY 6 HOURS PRN
Status: DISCONTINUED | OUTPATIENT
Start: 2019-12-21 | End: 2019-12-24 | Stop reason: HOSPADM

## 2019-12-21 RX ORDER — SODIUM CHLORIDE 9 MG/ML
INJECTION, SOLUTION INTRAVENOUS CONTINUOUS
Status: DISCONTINUED | OUTPATIENT
Start: 2019-12-21 | End: 2019-12-22

## 2019-12-21 RX ORDER — KETOROLAC TROMETHAMINE 15 MG/ML
15 INJECTION, SOLUTION INTRAMUSCULAR; INTRAVENOUS EVERY 6 HOURS PRN
Status: DISCONTINUED | OUTPATIENT
Start: 2019-12-21 | End: 2019-12-23

## 2019-12-21 RX ADMIN — Medication 1 PATCH: at 20:25

## 2019-12-21 RX ADMIN — SODIUM CHLORIDE: 9 INJECTION, SOLUTION INTRAVENOUS at 16:53

## 2019-12-21 RX ADMIN — DOCUSATE SODIUM 100 MG: 100 CAPSULE, LIQUID FILLED ORAL at 20:23

## 2019-12-21 RX ADMIN — FLUTICASONE FUROATE AND VILANTEROL TRIFENATATE 1 PUFF: 100; 25 POWDER RESPIRATORY (INHALATION) at 20:25

## 2019-12-21 RX ADMIN — IOHEXOL 75 ML: 350 INJECTION, SOLUTION INTRAVENOUS at 14:07

## 2019-12-21 RX ADMIN — KETOROLAC TROMETHAMINE 15 MG: 15 INJECTION, SOLUTION INTRAMUSCULAR; INTRAVENOUS at 13:20

## 2019-12-21 RX ADMIN — METOPROLOL TARTRATE 50 MG: 50 TABLET, FILM COATED ORAL at 20:23

## 2019-12-21 ASSESSMENT — ENCOUNTER SYMPTOMS
HEADACHES: 0
COLOR CHANGE: 0
SHORTNESS OF BREATH: 0
CONFUSION: 0
ABDOMINAL PAIN: 0
FEVER: 0
ARTHRALGIAS: 0
EYE REDNESS: 0
NECK STIFFNESS: 0
DIFFICULTY URINATING: 0

## 2019-12-21 ASSESSMENT — MIFFLIN-ST. JEOR: SCORE: 970.24

## 2019-12-21 NOTE — H&P
[facility]    History and Physical  Hospitalist       Date of Admission:  12/21/2019  Date of Service (when I saw the patient): 12/21/19    Assessment & Plan       Closed fracture of multiple ribs of left side: Trauma consult by Dr. Merrill. She noted immediate left rib pain when she awoke on the floor. She denies any other pain, there are no other visible injures. She was able to ambulate after the fall without problems with exception of worsening rib pain.      Syncope: True syncope with loss of consciousness reportedly of 1-2 minutes. She denies any symptoms prior to syncope, felt fine upon waking with exception of the left rib pain. Will get EKG, UA, place on cardiac monitor. She does have a cardiac history including MI and stents placed 10 years ago and aortic valve disease. Will get ECHO. She does report some ongoing fatigue over the last several months but no prior syncope.      Asthma: Moderate. Well controlled on Breo. She denies use of rescue inhaler. She does have increased risk of poor outcome with these rib fractures with asthma but fortunately it is well controlled.       Hypertension: On norvasc and metoprolol, pressures have been well controlled in the ED and at last clinic visit. Will continue home doses.       Panic disorder: Calm, she is not on any long term controller medications for this.       CAD (coronary artery disease): Stent 10 years ago. At that time she did have an MI. She has not had any cardiac problems since that time.       DVT Prophylaxis: Pneumatic Compression Devices  Code Status: Full Code    Disposition: Expected discharge in 1-3 days once pain controlled, pulmonary toilet adequate, cardiac monitoring x24 hours .    Daniela Red, CNP    Primary Care Physician   Theodore Lou    Chief Complaint   Syncope, rib pain    History is obtained from the patient    History of Present Illness   Frances Gilliam is a 85 year old female who presents with syncope and rib  pain. She was sitting at a table playing cards with her friends when she suddenly fell over the side of her chair onto the left side. Friends with her stated she was unresponsive for only a minute or 2. When she became responsive she felt fine and was able to get up from the floor with the help of her friends. She reports she has otherwise felt fine, though a bit more tired over the last several months as reported to her PCP. She has been eating and drinking well. She has never had syncope before. She has notice some mild peripheral edema-worse on the right than left that improves overnight. She has not had any significant dyspnea, chest pain, dizziness, abdominal pain or nausea.   Once getting up she noted left rib pain. She was brought to her PCP's clinic urgent care and was sent over to the ED. Through this she was able to ambulate independently though moving was painful to the left ribs. Plain films revealed multiple rib fractures, d-dimer was elevated so CT scan of her chest was completed, negative for PE. Non-contrast head CT was negative for acute hemorrhage.     Past Medical History    I have reviewed this patient's medical history and updated it with pertinent information if needed.   Past Medical History:   Diagnosis Date     Age-related macular degeneration      Aortic valve sclerosis      CAD (coronary artery disease)      Essential thrombocytosis (H)      GI hemorrhage      History of MI (myocardial infarction)      Hypertension      Iron deficiency anemia      Mixed hyperlipidemia      Peripheral vascular disease (H)      Vitamin B 12 deficiency      Vitamin D deficiency        Past Surgical History   I have reviewed this patient's surgical history and updated it with pertinent information if needed.  Past Surgical History:   Procedure Laterality Date     ESOPHAGOSCOPY, GASTROSCOPY, DUODENOSCOPY (EGD), COMBINED N/A 12/27/2017    Procedure: COMBINED ESOPHAGOSCOPY, GASTROSCOPY, DUODENOSCOPY (EGD);  UPPER  ENDOSCOPY WITH BIOPSIES, ENTEROSCOPY WITH  SMALL BOWEL ARGON PLASMA COAGULATION;  Surgeon: Robin Vaughn MD;  Location: [facility]     ID PATIENT HAS A CORONARY ARTERY STENT         Prior to Admission Medications   Prior to Admission Medications   Prescriptions Last Dose Informant Patient Reported? Taking?   Calcium Carb-Cholecalciferol (CALCIUM PLUS VITAMIN D3 PO) 12/21/2019 at Unknown time  Yes Yes   Sig: Take 1 tablet by mouth 2 times daily   Multiple Vitamins-Minerals (ICAPS PO) 12/21/2019  Yes Yes   Sig: Take 1 capsule by mouth daily   Nitroglycerin (NITROTAB SL) More than a month at Unknown time  Yes No   Sig: Place 0.4 mg under the tongue every 5 minutes as needed for chest pain   amLODIPine (NORVASC) 5 MG tablet 12/21/2019 at Unknown time  Yes Yes   Sig: Take 5 mg by mouth daily    aspirin 81 MG EC tablet 12/21/2019 at Unknown  Yes Yes   Sig: Take 81 mg by mouth daily   atorvastatin (LIPITOR) 20 MG tablet 12/20/2019 at Unknown time  Yes Yes   Sig: Take 20 mg by mouth daily    ferrous sulfate (IRON) 325 (65 FE) MG tablet 12/21/2019 at Unknown time  Yes Yes   Sig: Take 325 mg by mouth daily (with breakfast)   fluticasone-vilanterol (BREO ELLIPTA) 100-25 MCG/INH oral inhaler 12/20/2019 at Unknown time  Yes Yes   Sig: Inhale 1 puff into the lungs daily   metoprolol tartrate (LOPRESSOR) 50 MG tablet 12/21/2019 at Unknown time  Yes Yes   Sig: Take 50 mg by mouth 2 times daily    pantoprazole (PROTONIX) 40 MG EC tablet 12/20/2019 at Unknown time  Yes Yes   Sig: Take 40 mg by mouth every morning (before breakfast)    terazosin (HYTRIN) 10 MG capsule 12/21/2019 at Unknown time  Yes Yes   Sig: Take 10 mg by mouth daily    vitamin B-12 (CYANOCOBALAMIN) 100 MCG tablet   Yes Yes   Sig: Take 100 mcg by mouth daily      Facility-Administered Medications: None     Allergies   Allergies   Allergen Reactions     Niacin        Social History   I have reviewed this patient's social history and updated it with pertinent information  if needed. Frances Gilliam  reports that she quit smoking about 52 years ago. Her smoking use included cigarettes. She does not have any smokeless tobacco history on file.    Family History   I have reviewed this patient's family history and updated it with pertinent information if needed.   Family History   Problem Relation Age of Onset     Heart Disease Father        Review of Systems   CONSTITUTIONAL:  positive for  fatigue  negative for  fevers, chills, sweats, malaise, anorexia and weight loss  HEENT:  negative for  earaches, nasal congestion and sore throat  RESPIRATORY:  positive for  wheezing  negative for  dry cough, cough with sputum, dyspnea and hemoptysis  CARDIOVASCULAR:  positive for  chest pain, dyspnea, palpitations, orthopnea, exertional chest pressure/discomfort  negative for  fatigue, edema, syncope  GASTROINTESTINAL:  negative for nausea, vomiting, change in bowel habits and abdominal pain  GENITOURINARY:  positive for frequency  negative for dysuria, urinary incontinence and hematuria  MUSCULOSKELETAL:  negative for  myalgias, arthralgias and muscle weakness. Positive for left rib pain.  NEUROLOGICAL:  negative for headaches, dizziness, coordination problems, weakness and numbness      Physical Exam   Temp: 98.1  F (36.7  C) Temp src: Oral BP: 150/67 Pulse: 80 Heart Rate: 62 Resp: 20 SpO2: 96 % O2 Device: Nasal cannula Oxygen Delivery: 2 LPM  Vital Signs with Ranges  Temp:  [97.7  F (36.5  C)-98.1  F (36.7  C)] 98.1  F (36.7  C)  Pulse:  [68-80] 80  Heart Rate:  [62-71] 62  Resp:  [14-24] 20  BP: (136-156)/(61-70) 150/67  SpO2:  [87 %-99 %] 96 %  0 lbs 0 oz    Constitutional: Awake,alert, no acute distress at rest.   HEENT: Normocephalic, mucous membranes pink and moist. No cervical lymphadenopathy.  Respiratory: Clear bilaterally, no wheezes, crackles or rhonchi. She does have limited expansion due to pain.  Cardiovascular: HRR, + murmur, 1+ pitting edema bilateral lower legs.   GI:  Soft,nontender, bowel sounds positive.  Skin: No rashes, open areas or unusual bruising.   Musculoskeletal: Left rib pain with palpation, no pain to hips, knees or shoulders.   Neurologic: AOx3, no deficits noted.       Data   Data reviewed today:  I personally reviewed .  Recent Labs   Lab 12/21/19  1313   WBC 8.5   HGB 14.3   MCV 95         POTASSIUM 4.1   CHLORIDE 104   CO2 27   BUN 20   CR 0.78   ANIONGAP 7   CATARINA 9.3   *   ALBUMIN 3.9   PROTTOTAL 7.3   BILITOTAL 0.7   ALKPHOS 102   ALT 34   AST 24   TROPI <0.015       Recent Results (from the past 24 hour(s))   CT Head w/o Contrast    Narrative    PROCEDURE: CT HEAD W/O CONTRAST     HISTORY: TIA, initial exam.    COMPARISON: None.    TECHNIQUE:  Helical images of the head from the foramen magnum to the  vertex were obtained without contrast.    FINDINGS: The ventricles and sulci are prominent, compatible with  mild, generalized volume loss. No acute intracranial hemorrhage, mass  effect, midline shift, hydrocephalus or basilar cystern effacement are  present.    The grey-white matter interface is preserved. Patchy hypoattenuation  within the supratentorial subcortical and periventricular white matter  is most compatible with microvascular ischemic change.     The calvarium is intact.  A slightly irregular filling defect is  present in the left maxillary sinus.      Impression    IMPRESSION: No acute intracranial hemorrhage or CT evidence of  transcortical ischemia.    Heterogeneous left maxillary sinus filling defect. Recommend follow-up  CT sinus with contrast , ideally in a few weeks.    VÍCTOR AMIN MD   Ribs XR, unilat 3 views + PA chest,  left    Narrative    PROCEDURE:  XR RIBS & CHEST LT 3VW    HISTORY: Left-sided rib pain post fall, .    COMPARISON:  11/15/2016    FINDINGS:  The cardiomediastinal contours are stable.  The trachea is midline.   There is calcific aortic atherosclerosis.  Interstitial thickening is chronic.  Costochondral calcifications are  present. No focal consolidation, effusion or pneumothorax.    There are acute fractures of the left fourth, fifth and sixth ribs  posteriorly.      Impression    IMPRESSION:      Acute fractures of the left fourth, fifth and sixth ribs posteriorly.    VÍCTOR AMIN MD   CT Chest (PE) Abdomen Pelvis w Contrast    Narrative    PROCEDURE:  CT CHEST PE ABDOMEN PELVIS W CONTRAST.    HISTORY:  Evaluate for pulmonary embolism.  Chest-abd-pelvis trauma,  moderate, blunt; Chest pain post trauma with elevated d-dimer.    TECHNIQUE:  Initial pre-contrast  and localizer images were  obtained.  Contrast enhanced helical CT pulmonary angiography was then  performed.  Helical CT images of the abdomen and pelvis were  performed. Routine transaxial and post-processed (multiplanar and/or  MIP) reformations were obtained.    COMPARISON:  11/15/2016    MEDS/CONTRAST: OMNIPAQUE 350 100 mL    PULMONARY CTA FINDINGS:  This is a diagnostic quality helical CT  pulmonary angiogram.  There is no acute pulmonary embolism to the  first subsegmental pulmonary artery level. The main pulmonary artery  is dilated.    OTHER FINDINGS:      There is a low-density right thyroid nodule measuring 4 cm resulting  in slight right to left midline shift of trachea. The heart is  enlarged. There is a moderate hiatal hernia.    Trace bilateral effusions are present. No pneumothorax is present. The  central airways are unremarkable. No focal consolidation or  intrapulmonary mass is identified.    Acute fractures of the left posterior fourth, fifth, sixth and seventh  ribs are seen.    Liver, gallbladder, pancreas, spleen and adrenal glands demonstrate no  evidence of trauma or other acute abnormality. The infrarenal aorta is  dilated up to 3.3 cm. Symmetric nephrograms are present without  hydronephrosis.      Impression    IMPRESSION:    No acute pulmonary emboli to the subsegmental level.    Cardiomegaly. Pulmonary  hypertension.    Acute posterior left fourth, fifth, sixth and seventh rib fractures.  No pneumothorax.    No evidence of intra-abdominal organ blunt injury.    VÍCTOR AMIN MD

## 2019-12-21 NOTE — CONSULTS
Patient being admitted to medicine for syncope and resulting left sided rib fractures 4-7.  Images reviewed of CT head, chest, abdomen,pelvis reviewed and positive for the rib fractures, a left maxillary sinus filling defect and a right thyroid nodule.  I've been consulted for the trauma.  Respiratory is seeing the patient for FVC measurement.  Aggressive pulmonary toilet, pain control as part of the multiple rib fracture protocol will be followed. Formal tertiary to follow later.

## 2019-12-21 NOTE — DISCHARGE INSTRUCTIONS
What to expect when you have contrast    During your exam, we will inject  contrast  into your vein or artery. (Contrast is a clear liquid with iodine in it. It shows up on X-rays.)    You may feel warm or hot. You may have a metal taste in your mouth and a slight upset stomach. You may also feel pressure near the kidneys and bladder. These effects will last about 1 to 3 minutes.    Please tell us if you have:    Sneezing     Itching    Hives     Swelling in the face    A hoarse voice    Breathing problems    Other new symptoms    Serious problems are rare.  They may include:    Irregular heartbeat     Seizures    Kidney failure              Tissue damage    Shock      Death    If you have any problems during the exam, we  will treat them right away.    When you get home    Call your hospital if you have any new symptoms in the next 2 days, like hives or swelling. (Phone numbers are at the bottom of this page.) Or call your family doctor.     If you have wheezing or trouble breathing, call 911.    Self-care  -Drink at least 4 extra glasses of water today.   This reduces the stress on your kidneys.  -Keep taking your regular medicines.    The contrast will pass out of your body in your  Urine(pee). This will happen in the next 24 hours. You  will not feel this. Your urine will not  change color.    If you have kidney problems or take metformin    Drink 4 to 8 large glasses of water for the next  2 days, if you are not on a fluid restriction.    ?If you take metformin (Glucophage or Glucovance) for diabetes, keep taking it.      ?Your kidney function tests are abnormal.  If you take Metformin, do not take it for 48 hours. Please go to your clinic for a blood test within 3 days after your exam before the restarting this medicine.     (Note to provider:please give patient prescription for lab tests.)    ?Special instructions:     I have read and understand the above information.    Patient Sign  Here:______________________________________Date:________Time:______    Staff Sign Here:________________________________________Date:_______Time:______      Radiology Departments:     ?[facility]: [phone] ?[place]: [phone]     ?[place]: [phone] ?[place]:[phone]      ?[place]: [phone]  ?[place]: [phone]  ?[place]:[phone]    ?[facility]:[phone]  ?[facility]:[phone]

## 2019-12-21 NOTE — PLAN OF CARE
PTA med list reviewed with [facility] med list, chart review and [facility] report review. Updated appropriate strengths and verified frequencies. Added in Vit B-12. Deleted a bunch of outside meds from 2014 (was on crestor, omeprazole, mag ox, metoprolol, fish oil, lexapro and lorazepam- no recent records of these meds).    Protonix is not from [facility] so if she isn't getting it from another pharmacy, it may be an OTC. Needs verification with patient.     Will speak to patient when she gets admitted otherwise nurse can complete PTA medlist and I will check it in the morning.     Marcia Arnett on 12/21/2019 at 3:42 PM

## 2019-12-21 NOTE — ED PROVIDER NOTES
History     Chief Complaint   Patient presents with     Loss of Consciousness     Chest Pain     HPI  Frances Gilliam is a 85 year old female who presents to the emergency department accompanied by the son.  Patient had a syncopal episode while playing cards with her friend earlier this morning at [employer].  She is suddenly lost consciousness and stooped forward falling down but was able to wake up after approximately 2 minutes.  Presents now with complaints of left-sided rib pains.  Pain is rated as 10/10, left side of chest wall, does not radiate, aggravated by palpation and deep inspiration.  No fever, chills, palpitations.  Denies unilateral body weakness, visual changes or headache.    Allergies:  Allergies   Allergen Reactions     Niacin Rash       Problem List:    Patient Active Problem List    Diagnosis Date Noted     Coronary atherosclerosis 12/21/2019     Priority: Medium     Disorder of bone and cartilage 12/21/2019     Priority: Medium     Hyperlipidemia 12/21/2019     Priority: Medium     Hypertension 12/21/2019     Priority: Medium     Macular degeneration 12/21/2019     Priority: Medium     Occlusion and stenosis of carotid artery without mention of cerebral infarction 12/21/2019     Priority: Medium     Overview:   with 50-75% stenoses of left, right internal carotid arteries, documented by   US 5/15/02 and 06/16/04       Closed fracture of multiple ribs of left side 12/21/2019     Priority: Medium     Syncope 12/21/2019     Priority: Medium     Multiple rib fractures 12/21/2019     Priority: Medium     Vitamin D deficiency      Priority: Medium     Vitamin B 12 deficiency      Priority: Medium     Peripheral vascular disease (H)      Priority: Medium     Iron deficiency anemia      Priority: Medium     History of MI (myocardial infarction)      Priority: Medium     GI hemorrhage      Priority: Medium     Essential thrombocytosis (H)      Priority: Medium     CAD (coronary artery disease)       Priority: Medium     Aortic valve sclerosis      Priority: Medium     Anemia 2016     Priority: Medium     Anxiety about health 2014     Priority: Medium     Panic disorder 2014     Priority: Medium     Asthma 10/11/2011     Priority: Medium     Lumbago 2006     Priority: Medium     IMO Update 10/11       Sacroiliitis, not elsewhere classified (H) 2006     Priority: Medium     IMO Update 10/11          Past Medical History:    Past Medical History:   Diagnosis Date     Age-related macular degeneration      Aortic valve sclerosis      CAD (coronary artery disease)      Essential thrombocytosis (H)      GI hemorrhage      History of MI (myocardial infarction)      Hypertension      Iron deficiency anemia      Mixed hyperlipidemia      Peripheral vascular disease (H)      Vitamin B 12 deficiency      Vitamin D deficiency        Past Surgical History:    Past Surgical History:   Procedure Laterality Date     ESOPHAGOSCOPY, GASTROSCOPY, DUODENOSCOPY (EGD), COMBINED N/A 2017    Procedure: COMBINED ESOPHAGOSCOPY, GASTROSCOPY, DUODENOSCOPY (EGD);  UPPER ENDOSCOPY WITH BIOPSIES, ENTEROSCOPY WITH  SMALL BOWEL ARGON PLASMA COAGULATION;  Surgeon: Robin Vaughn MD;  Location: [facility]     [place] PATIENT HAS A CORONARY ARTERY STENT         Family History:    Family History   Problem Relation Age of Onset     Heart Disease Father        Social History:  Marital Status:   [5]  Social History     Tobacco Use     Smoking status: Former Smoker     Types: Cigarettes     Last attempt to quit: 1968     Years since quittin.0   Substance Use Topics     Alcohol use: None     Drug use: None        Medications:    No current outpatient medications on file.        Review of Systems   Constitutional: Negative for fever.   HENT: Negative for congestion.    Eyes: Negative for redness.   Respiratory: Negative for shortness of breath.    Cardiovascular: Positive for chest pain.   Gastrointestinal: Negative  "for abdominal pain.   Genitourinary: Negative for difficulty urinating.   Musculoskeletal: Negative for arthralgias and neck stiffness.   Skin: Negative for color change.   Neurological: Negative for headaches.   Psychiatric/Behavioral: Negative for confusion.   All other systems reviewed and are negative.      Physical Exam   BP: 156/63  Pulse: 71  Heart Rate: 71  Temp: 97.7  F (36.5  C)  Resp: 24  Height: 149.9 cm (4' 11\")  Weight: 62 kg (136 lb 9.6 oz)  SpO2: (!) 87 %      Physical Exam  Vitals signs and nursing note reviewed.   Constitutional:       General: She is in acute distress.      Appearance: She is well-developed. She is not diaphoretic.   HENT:      Head: Atraumatic.      Mouth/Throat:      Pharynx: No oropharyngeal exudate.   Eyes:      General: No scleral icterus.     Pupils: Pupils are equal, round, and reactive to light.   Cardiovascular:      Heart sounds: Normal heart sounds.   Pulmonary:      Effort: No respiratory distress.      Breath sounds: Normal breath sounds.   Chest:       Abdominal:      General: Bowel sounds are normal.      Palpations: Abdomen is soft.      Tenderness: There is no abdominal tenderness.   Musculoskeletal:         General: No tenderness.   Skin:     General: Skin is warm.      Findings: No rash.   Neurological:      Mental Status: She is alert.         ED Course   Evaluated upon arrival and CT scan head ordered.  Laboratory tests ordered including left rib x-rays.  Toradol IV given for pain control.       Procedures    Results for orders placed or performed during the hospital encounter of 12/21/19 (from the past 24 hour(s))   CBC with platelets differential   Result Value Ref Range    WBC 8.5 4.0 - 11.0 10e9/L    RBC Count 4.44 3.8 - 5.2 10e12/L    Hemoglobin 14.3 11.7 - 15.7 g/dL    Hematocrit 42.0 35.0 - 47.0 %    MCV 95 78 - 100 fl    MCH 32.2 26.5 - 33.0 pg    MCHC 34.0 31.5 - 36.5 g/dL    RDW 12.6 10.0 - 15.0 %    Platelet Count 174 150 - 450 10e9/L    Diff Method " Automated Method     % Neutrophils 68.4 %    % Lymphocytes 23.9 %    % Monocytes 5.2 %    % Eosinophils 1.2 %    % Basophils 0.2 %    % Immature Granulocytes 1.1 %    Nucleated RBCs 0 0 /100    Absolute Neutrophil 5.8 1.6 - 8.3 10e9/L    Absolute Lymphocytes 2.0 0.8 - 5.3 10e9/L    Absolute Monocytes 0.4 0.0 - 1.3 10e9/L    Absolute Eosinophils 0.1 0.0 - 0.7 10e9/L    Absolute Basophils 0.0 0.0 - 0.2 10e9/L    Abs Immature Granulocytes 0.1 0 - 0.4 10e9/L    Absolute Nucleated RBC 0.0    D dimer quantitative   Result Value Ref Range    D Dimer 3.2 (H) 0.0 - 0.50 ug/ml FEU   Comprehensive metabolic panel   Result Value Ref Range    Sodium 138 133 - 144 mmol/L    Potassium 4.1 3.4 - 5.3 mmol/L    Chloride 104 94 - 109 mmol/L    Carbon Dioxide 27 20 - 32 mmol/L    Anion Gap 7 3 - 14 mmol/L    Glucose 122 (H) 70 - 99 mg/dL    Urea Nitrogen 20 7 - 30 mg/dL    Creatinine 0.78 0.52 - 1.04 mg/dL    GFR Estimate 70 >60 mL/min/[1.73_m2]    GFR Estimate If Black 81 >60 mL/min/[1.73_m2]    Calcium 9.3 8.5 - 10.1 mg/dL    Bilirubin Total 0.7 0.2 - 1.3 mg/dL    Albumin 3.9 3.4 - 5.0 g/dL    Protein Total 7.3 6.8 - 8.8 g/dL    Alkaline Phosphatase 102 40 - 150 U/L    ALT 34 0 - 50 U/L    AST 24 0 - 45 U/L   Troponin I   Result Value Ref Range    Troponin I ES <0.015 0.000 - 0.045 ug/L   Lactic acid whole blood   Result Value Ref Range    Lactic Acid 1.4 0.7 - 2.0 mmol/L   Nt probnp inpatient (BNP)   Result Value Ref Range    N-Terminal Pro BNP Inpatient 396 0 - 1,800 pg/mL   CRP inflammation   Result Value Ref Range    CRP Inflammation <2.9 0.0 - 8.0 mg/L   CT Head w/o Contrast    Narrative    PROCEDURE: CT HEAD W/O CONTRAST     HISTORY: TIA, initial exam.    COMPARISON: None.    TECHNIQUE:  Helical images of the head from the foramen magnum to the  vertex were obtained without contrast.    FINDINGS: The ventricles and sulci are prominent, compatible with  mild, generalized volume loss. No acute intracranial hemorrhage,  mass  effect, midline shift, hydrocephalus or basilar cystern effacement are  present.    The grey-white matter interface is preserved. Patchy hypoattenuation  within the supratentorial subcortical and periventricular white matter  is most compatible with microvascular ischemic change.     The calvarium is intact.  A slightly irregular filling defect is  present in the left maxillary sinus.      Impression    IMPRESSION: No acute intracranial hemorrhage or CT evidence of  transcortical ischemia.    Heterogeneous left maxillary sinus filling defect. Recommend follow-up  CT sinus with contrast , ideally in a few weeks.    VÍCTOR AMIN MD   Ribs XR, unilat 3 views + PA chest,  left    Narrative    PROCEDURE:  XR RIBS & CHEST LT 3VW    HISTORY: Left-sided rib pain post fall, .    COMPARISON:  11/15/2016    FINDINGS:  The cardiomediastinal contours are stable.  The trachea is midline.   There is calcific aortic atherosclerosis.  Interstitial thickening is chronic. Costochondral calcifications are  present. No focal consolidation, effusion or pneumothorax.    There are acute fractures of the left fourth, fifth and sixth ribs  posteriorly.      Impression    IMPRESSION:      Acute fractures of the left fourth, fifth and sixth ribs posteriorly.    VÍCTOR AMIN MD   CT Chest (PE) Abdomen Pelvis w Contrast    Narrative    PROCEDURE:  CT CHEST PE ABDOMEN PELVIS W CONTRAST.    HISTORY:  Evaluate for pulmonary embolism.  Chest-abd-pelvis trauma,  moderate, blunt; Chest pain post trauma with elevated d-dimer.    TECHNIQUE:  Initial pre-contrast  and localizer images were  obtained.  Contrast enhanced helical CT pulmonary angiography was then  performed.  Helical CT images of the abdomen and pelvis were  performed. Routine transaxial and post-processed (multiplanar and/or  MIP) reformations were obtained.    COMPARISON:  11/15/2016    MEDS/CONTRAST: OMNIPAQUE 350 100 mL    PULMONARY CTA FINDINGS:  This is a  diagnostic quality helical CT  pulmonary angiogram.  There is no acute pulmonary embolism to the  first subsegmental pulmonary artery level. The main pulmonary artery  is dilated.    OTHER FINDINGS:      There is a low-density right thyroid nodule measuring 4 cm resulting  in slight right to left midline shift of trachea. The heart is  enlarged. There is a moderate hiatal hernia.    Trace bilateral effusions are present. No pneumothorax is present. The  central airways are unremarkable. No focal consolidation or  intrapulmonary mass is identified.    Acute fractures of the left posterior fourth, fifth, sixth and seventh  ribs are seen.    Liver, gallbladder, pancreas, spleen and adrenal glands demonstrate no  evidence of trauma or other acute abnormality. The infrarenal aorta is  dilated up to 3.3 cm. Symmetric nephrograms are present without  hydronephrosis.      Impression    IMPRESSION:    No acute pulmonary emboli to the subsegmental level.    Cardiomegaly. Pulmonary hypertension.    Acute posterior left fourth, fifth, sixth and seventh rib fractures.  No pneumothorax.    No evidence of intra-abdominal organ blunt injury.    VÍCTOR AMIN MD   Active MRSA Surveillance Culture   Result Value Ref Range    Specimen Description Nares     Culture Micro Culture in progress    UA with Microscopic reflex to Culture   Result Value Ref Range    Color Urine Light Yellow     Appearance Urine Clear     Glucose Urine Negative NEG^Negative mg/dL    Bilirubin Urine Negative NEG^Negative    Ketones Urine 5 (A) NEG^Negative mg/dL    Specific Gravity Urine 1.010 1.003 - 1.035    Blood Urine Negative NEG^Negative    pH Urine 6.0 4.7 - 8.0 pH    Protein Albumin Urine 10 (A) NEG^Negative mg/dL    Urobilinogen mg/dL Normal 0.0 - 2.0 mg/dL    Nitrite Urine Negative NEG^Negative    Leukocyte Esterase Urine Small (A) NEG^Negative    Source Midstream Urine     WBC Urine 3 0 - 5 /HPF    RBC Urine 4 (H) 0 - 2 /HPF    Bacteria Urine  None (A) NEG^Negative /HPF    Squamous Epithelial /HPF Urine 10 (H) 0 - 1 /HPF    Mucous Urine Present (A) NEG^Negative /LPF   Troponin I   Result Value Ref Range    Troponin I ES <0.015 0.000 - 0.045 ug/L   Basic metabolic panel   Result Value Ref Range    Sodium 140 133 - 144 mmol/L    Potassium 3.6 3.4 - 5.3 mmol/L    Chloride 107 94 - 109 mmol/L    Carbon Dioxide 26 20 - 32 mmol/L    Anion Gap 7 3 - 14 mmol/L    Glucose 88 70 - 99 mg/dL    Urea Nitrogen 16 7 - 30 mg/dL    Creatinine 0.64 0.52 - 1.04 mg/dL    GFR Estimate 81 >60 mL/min/[1.73_m2]    GFR Estimate If Black >90 >60 mL/min/[1.73_m2]    Calcium 8.1 (L) 8.5 - 10.1 mg/dL   CBC with platelets differential   Result Value Ref Range    WBC 6.8 4.0 - 11.0 10e9/L    RBC Count 3.84 3.8 - 5.2 10e12/L    Hemoglobin 12.5 11.7 - 15.7 g/dL    Hematocrit 36.3 35.0 - 47.0 %    MCV 95 78 - 100 fl    MCH 32.6 26.5 - 33.0 pg    MCHC 34.4 31.5 - 36.5 g/dL    RDW 12.8 10.0 - 15.0 %    Platelet Count 138 (L) 150 - 450 10e9/L    Diff Method Automated Method     % Neutrophils 73.2 %    % Lymphocytes 17.6 %    % Monocytes 7.6 %    % Eosinophils 1.0 %    % Basophils 0.3 %    % Immature Granulocytes 0.3 %    Nucleated RBCs 0 0 /100    Absolute Neutrophil 4.9 1.6 - 8.3 10e9/L    Absolute Lymphocytes 1.2 0.8 - 5.3 10e9/L    Absolute Monocytes 0.5 0.0 - 1.3 10e9/L    Absolute Eosinophils 0.1 0.0 - 0.7 10e9/L    Absolute Basophils 0.0 0.0 - 0.2 10e9/L    Abs Immature Granulocytes 0.0 0 - 0.4 10e9/L    Absolute Nucleated RBC 0.0        Medications   ketorolac (TORADOL) injection 15 mg (has no administration in time range)   Lidocaine (LIDOCARE) 4 % Patch 1 patch (1 patch Transdermal Patch/Med Applied 12/21/19 2025)     And   lidocaine patch in PLACE ( Transdermal Patch in Place 12/22/19 5934)   acetaminophen (TYLENOL) tablet 650 mg (has no administration in time range)   acetaminophen (TYLENOL) Suppository 650 mg (has no administration in time range)   naloxone (NARCAN) injection  0.1-0.4 mg (has no administration in time range)   melatonin tablet 1 mg (has no administration in time range)   ondansetron (ZOFRAN-ODT) ODT tab 4 mg (has no administration in time range)     Or   ondansetron (ZOFRAN) injection 4 mg (has no administration in time range)   lidocaine 1 % 0.1-1 mL (has no administration in time range)   lidocaine (LMX4) kit (has no administration in time range)   sodium chloride (PF) 0.9% PF flush 3 mL (has no administration in time range)   sodium chloride (PF) 0.9% PF flush 3 mL (3 mLs Intracatheter Not Given 12/22/19 0218)   sodium chloride 0.9% infusion ( Intravenous New Bag 12/22/19 0442)   HYDROcodone-acetaminophen (NORCO) 5-325 MG per tablet 1-2 tablet (1 tablet Oral Given 12/22/19 0006)   HYDROmorphone (PF) (DILAUDID) injection 0.3 mg (has no administration in time range)   docusate sodium (COLACE) capsule 100 mg (100 mg Oral Given 12/21/19 2023)   amLODIPine (NORVASC) tablet 5 mg (has no administration in time range)   aspirin EC tablet 81 mg (has no administration in time range)   fluticasone-vilanterol (BREO ELLIPTA) 100-25 MCG/INH inhaler 1 puff (1 puff Inhalation Given 12/21/19 2025)   metoprolol tartrate (LOPRESSOR) tablet 50 mg (50 mg Oral Given 12/21/19 2023)   ketorolac (TORADOL) injection 15 mg (15 mg Intravenous Given 12/21/19 1320)   iohexol (OMNIPAQUE) 350 mg/mL solution 75 mL (75 mLs Intravenous Given 12/21/19 1407)   sodium chloride (PF) 0.9% PF flush 100 mL (100 mLs Intravenous Given 12/21/19 1407)       Assessments & Plan (with Medical Decision Making)   Traumatic multiple rib fractures on the left: Patient presented to the ED with left-sided rib pains after a syncopal episode while playing cards with friends.  The incident happened 1 hour prior to arrival at the ED.  Patient was hypoxic upon arrival to the ED with oxygen saturation of 88% room air and was immediately started on oxygen per nasal cannula.  Laboratory test done showed normal CBC, CMP, troponin,  urinalysis, elevated d-dimer but CTA was negative for PE.  CT head did not reveal any acute changes.  CT chest showed multiple rib fractures on the left posteriorly including fourth, fifth, sixth and seventh ribs.  Forced vital capacity was 700 mL's.  Dr. Merrill trauma surgeon on-call was consulted and will review patient within 18 hours of admission.  Patient will be admitted under the care of Dr. Higgins.  Started on incentive spirometry and IV Toradol for pain control.  Patient and relatives were in agreement with treatment plan and patient was subsequently admitted to the hospital.    I have reviewed the nursing notes.    I have reviewed the findings, diagnosis, plan and need for follow up with the patient.    Current Discharge Medication List          Final diagnoses:   Closed fracture of multiple ribs of left side, initial encounter   Hypoxia       12/21/2019   [facility]     Graham Traylor MD  12/22/19 0828

## 2019-12-21 NOTE — PLAN OF CARE
[facility] Inpatient Admission Note:    Patient admitted to 3112/3112-1 at approximately 1610 via cart accompanied by transport tech from emergency room . Report received from ALYSSA Macias in SBAR format at 1601 via telephone. Patient transferred to bed via slide board.. Patient is alert and oriented X 3, reports pain; rates at 1 on 0-10 scale. 10 with movement.  Patient oriented to room, unit, hourly rounding, and plan of care. Explained admission packet and patient handbook with patient bill of rights brochure. Will continue to monitor and document as needed.     Inpatient Nursing criteria listed below was met:    Health care directives status obtained and documented: Yes    Care Everywhere authorization obtained No    MRSA swab completed for patient 65 years and older: Yes    Patient identifies a surrogate decision maker: No      If initial lactic acid >2.0, repeat lactic acid drawn within one hour of arrival to unit: NA. If no, state reason: NA    Vaccination assessment and education completed: Yes   Vaccinations received prior to admission: Pneumovax yes  Influenza(seasonal)  YES   Vaccination(s) ordered: not given today because already received    Clergy visit ordered if patient requests: Yes; pt declined.    Skin issues/needs documented: Yes    Isolation Patient: no Education given, correct sign in place and documentation row added to PCS:  NA    Fall Prevention Yes: Care plan updated, education given and documented, sticker and magnet in place: Yes    Care Plan initiated: Yes    Education Documented (including assessment): Yes    Patient has discharge needs : Yes If yes, please explain: pain control

## 2019-12-21 NOTE — ED TRIAGE NOTES
"Presents with reports of LOC and R sided chest pain. Patient states an hour ago she was playing cards \"and I toppled over.\" Denies being dizzy prior to incident. States she was \"out for a few seconds, my girl friends got me up.\" GCS 15 on arrival - denies dizziness. Denies neck pain. Denies headache. Negative BEFAST. States R sided chest pain began after fall. States it \"hurts to breath.\" Rates pain 10/10. Patient guarding R side of trunk. Denies hitting R side of chest during fall.  States a hx of MI with one stent placement in 2010  "

## 2019-12-22 LAB
ANION GAP SERPL CALCULATED.3IONS-SCNC: 7 MMOL/L (ref 3–14)
BASOPHILS # BLD AUTO: 0 10E9/L (ref 0–0.2)
BASOPHILS NFR BLD AUTO: 0.3 %
BUN SERPL-MCNC: 16 MG/DL (ref 7–30)
CALCIUM SERPL-MCNC: 8.1 MG/DL (ref 8.5–10.1)
CHLORIDE SERPL-SCNC: 107 MMOL/L (ref 94–109)
CO2 SERPL-SCNC: 26 MMOL/L (ref 20–32)
CREAT SERPL-MCNC: 0.64 MG/DL (ref 0.52–1.04)
DIFFERENTIAL METHOD BLD: ABNORMAL
EOSINOPHIL # BLD AUTO: 0.1 10E9/L (ref 0–0.7)
EOSINOPHIL NFR BLD AUTO: 1 %
ERYTHROCYTE [DISTWIDTH] IN BLOOD BY AUTOMATED COUNT: 12.8 % (ref 10–15)
GFR SERPL CREATININE-BSD FRML MDRD: 81 ML/MIN/{1.73_M2}
GLUCOSE SERPL-MCNC: 88 MG/DL (ref 70–99)
HCT VFR BLD AUTO: 36.3 % (ref 35–47)
HGB BLD-MCNC: 12.5 G/DL (ref 11.7–15.7)
IMM GRANULOCYTES # BLD: 0 10E9/L (ref 0–0.4)
IMM GRANULOCYTES NFR BLD: 0.3 %
LYMPHOCYTES # BLD AUTO: 1.2 10E9/L (ref 0.8–5.3)
LYMPHOCYTES NFR BLD AUTO: 17.6 %
MCH RBC QN AUTO: 32.6 PG (ref 26.5–33)
MCHC RBC AUTO-ENTMCNC: 34.4 G/DL (ref 31.5–36.5)
MCV RBC AUTO: 95 FL (ref 78–100)
MONOCYTES # BLD AUTO: 0.5 10E9/L (ref 0–1.3)
MONOCYTES NFR BLD AUTO: 7.6 %
NEUTROPHILS # BLD AUTO: 4.9 10E9/L (ref 1.6–8.3)
NEUTROPHILS NFR BLD AUTO: 73.2 %
NRBC # BLD AUTO: 0 10*3/UL
NRBC BLD AUTO-RTO: 0 /100
PLATELET # BLD AUTO: 138 10E9/L (ref 150–450)
POTASSIUM SERPL-SCNC: 3.6 MMOL/L (ref 3.4–5.3)
RBC # BLD AUTO: 3.84 10E12/L (ref 3.8–5.2)
SODIUM SERPL-SCNC: 140 MMOL/L (ref 133–144)
WBC # BLD AUTO: 6.8 10E9/L (ref 4–11)

## 2019-12-22 PROCEDURE — G0378 HOSPITAL OBSERVATION PER HR: HCPCS

## 2019-12-22 PROCEDURE — 25000132 ZZH RX MED GY IP 250 OP 250 PS 637: Performed by: NURSE PRACTITIONER

## 2019-12-22 PROCEDURE — 94799 UNLISTED PULMONARY SVC/PX: CPT

## 2019-12-22 PROCEDURE — 25800030 ZZH RX IP 258 OP 636: Performed by: NURSE PRACTITIONER

## 2019-12-22 PROCEDURE — 85025 COMPLETE CBC W/AUTO DIFF WBC: CPT | Performed by: NURSE PRACTITIONER

## 2019-12-22 PROCEDURE — 12000000 ZZH R&B MED SURG/OB

## 2019-12-22 PROCEDURE — 40000275 ZZH STATISTIC RCP TIME EA 10 MIN

## 2019-12-22 PROCEDURE — 99232 SBSQ HOSP IP/OBS MODERATE 35: CPT | Performed by: NURSE PRACTITIONER

## 2019-12-22 PROCEDURE — 36415 COLL VENOUS BLD VENIPUNCTURE: CPT | Performed by: NURSE PRACTITIONER

## 2019-12-22 PROCEDURE — 96376 TX/PRO/DX INJ SAME DRUG ADON: CPT

## 2019-12-22 PROCEDURE — 80048 BASIC METABOLIC PNL TOTAL CA: CPT | Performed by: NURSE PRACTITIONER

## 2019-12-22 PROCEDURE — 25000128 H RX IP 250 OP 636: Performed by: NURSE PRACTITIONER

## 2019-12-22 PROCEDURE — 99232 SBSQ HOSP IP/OBS MODERATE 35: CPT | Performed by: SURGERY

## 2019-12-22 RX ADMIN — METOPROLOL TARTRATE 50 MG: 50 TABLET, FILM COATED ORAL at 20:05

## 2019-12-22 RX ADMIN — DOCUSATE SODIUM 100 MG: 100 CAPSULE, LIQUID FILLED ORAL at 20:05

## 2019-12-22 RX ADMIN — KETOROLAC TROMETHAMINE 15 MG: 15 INJECTION, SOLUTION INTRAMUSCULAR; INTRAVENOUS at 10:22

## 2019-12-22 RX ADMIN — HYDROCODONE BITARTRATE AND ACETAMINOPHEN 1 TABLET: 5; 325 TABLET ORAL at 00:06

## 2019-12-22 RX ADMIN — SODIUM CHLORIDE: 9 INJECTION, SOLUTION INTRAVENOUS at 04:42

## 2019-12-22 RX ADMIN — FLUTICASONE FUROATE AND VILANTEROL TRIFENATATE 1 PUFF: 100; 25 POWDER RESPIRATORY (INHALATION) at 10:22

## 2019-12-22 RX ADMIN — ASPIRIN 81 MG: 81 TABLET, COATED ORAL at 08:28

## 2019-12-22 RX ADMIN — METOPROLOL TARTRATE 50 MG: 50 TABLET, FILM COATED ORAL at 08:29

## 2019-12-22 RX ADMIN — KETOROLAC TROMETHAMINE 15 MG: 15 INJECTION, SOLUTION INTRAMUSCULAR; INTRAVENOUS at 16:41

## 2019-12-22 RX ADMIN — Medication 1 PATCH: at 20:05

## 2019-12-22 RX ADMIN — AMLODIPINE BESYLATE 5 MG: 5 TABLET ORAL at 08:29

## 2019-12-22 RX ADMIN — DOCUSATE SODIUM 100 MG: 100 CAPSULE, LIQUID FILLED ORAL at 08:29

## 2019-12-22 ASSESSMENT — ACTIVITIES OF DAILY LIVING (ADL)
ADLS_ACUITY_SCORE: 17
ADLS_ACUITY_SCORE: 17

## 2019-12-22 NOTE — PLAN OF CARE
Face to face report given with opportunity to observe patient.    Report given to Kaelyn HOWE RN.    Senait Cabrera RN   12/21/2019  11:26 PM

## 2019-12-22 NOTE — PLAN OF CARE
"Reason for hospital stay:  L sided multiple rib fracture  Living situation PTA: Home-alone  Most recent vitals: /65   Pulse 73   Temp 98.7  F (37.1  C) (Tympanic)   Resp 20   Ht 1.499 m (4' 11\")   Wt 62 kg (136 lb 9.6 oz)   SpO2 (!) 91%   BMI 27.59 kg/m      Pain Management:  Pt reports pain increasing at beginning of shift- reports pain is 4/10 and L anterior/posterior rib cage area, Given Norco 5 mg with absence of nonverbal indicators on pain follow up. Pt reports pain has decreased-2/10 and is tolerable at 0430.   LOC:  A&O x4  Cardiac:  Apical pulse regular, no edema present  Respiratory:  Posterior lungs diminished, clear, equal bilaterally. Dyspnea on exertion, no shortness of breath, infrequent non-productive cough--splinted  GI:  Bowel sounds active and audible all four quadrants, Denies N/V. Pt sats dropping to 80's encouraged deep breaths- did not increase. O2 increased to 2 LPM- sats remaining above 90%.   :  Voiding adequately  Skin Issues:  No open areas or bruising noted    IVF:  NS 75 ml/hr    Nutrition: Regular   ADL's:  Independent  Ambulation: Assist of 1 gait belt/walker  Safety:  Call light within reach     Comments:    Face to face report given with opportunity to observe patient.    Report given to Melodie Rivas   12/22/2019  7:37 AM      "

## 2019-12-22 NOTE — PLAN OF CARE
Vitals: VSS. On 1L, sats in mid to low 90's. On RA, sats dip to mid 80's.     Pain: Pain rated 2/10 to left rib cage. Treating with Toradol.     Orientation: A&O.    Cardiac: Tele reading SR 60's per AVB per ICU    Lungs: Clear. Shallow breathing due to rib fx.     ABD: Bowels Active.     Urinating: WNL    Skin: Scattered bruising.     IV fluids: SL.    Antibiotics: IV none    Mobility: Up with stand by assistance. Walker and gait belt used. Weaker on left side due to pain.      Safety: Bed/chair alarm on. Call light in reach. Rounding done.    Comment: Slept well throughout night. Up in chair for most of shift. Visitor present over lunch. Currently napping.     Face to face report given with opportunity to observe patient.    Report given to Senait Prado RN   12/22/2019  3:39 PM

## 2019-12-22 NOTE — CONSULTS
Surgery Consult Note      : 1934    MELCHOR: Dec 21, 2019      Chief Complaint:  Left sided rib pain    History of Present Illness:  Mrs. Gilliam is a very pleasant 85 year old female, who I am seeing at the request of Daniela Red for evaluation of trauma. She was playing cards with her friends this afternoon when she suffered a syncopal event and fell to the floor with LOC.  Images as I described in prior note.  She's admitted for multiple rib fractures 4-7 on the left and as work up for syncope.        PRIMARY:  A: Patent, patient protecting  B: CTA B/L, equal chest rise  C: No external bleeding, 2+ right radial pulse  D: GCS 15, answers questions appropriately, moving all extremities, eyes open  E: Hospital gown on.    /58   Pulse 73   Temp 98.4  F (36.9  C) (Tympanic)   Resp 20   SpO2 94%     SECONDARY:   Head: NCAT, sitting up playing scratchers with her sons  EYE: pupils 3mm PERRL, EMOI  Nares: clear  Mouth: no blood in oral pharynx or missing teeth  Face: no sinus tenderness or zygomatic instability  Neck: no supraclavicular crepitus, no JVD, trachea midline  Chest: Tender to palpation L flank without chest wall deformity  Heart:: RRR  Abd: Soft, ND/NT no rebound, no guarding, pelvis stable  UE: No long bone deformity, MS 5/5, sensation grossly intact  LE: No long bone deformity, MS 5/5, sensation grossly intact  Spine: No step offs, no midline tenderness    Medical history:  Past Medical History:   Diagnosis Date     Age-related macular degeneration      Aortic valve sclerosis      CAD (coronary artery disease)      Essential thrombocytosis (H)      GI hemorrhage      History of MI (myocardial infarction)      Hypertension      Iron deficiency anemia      Mixed hyperlipidemia      Peripheral vascular disease (H)      Vitamin B 12 deficiency      Vitamin D deficiency        Surgical history:  Past Surgical History:   Procedure Laterality Date     ESOPHAGOSCOPY, GASTROSCOPY, DUODENOSCOPY (EGD),  COMBINED N/A 2017    Procedure: COMBINED ESOPHAGOSCOPY, GASTROSCOPY, DUODENOSCOPY (EGD);  UPPER ENDOSCOPY WITH BIOPSIES, ENTEROSCOPY WITH  SMALL BOWEL ARGON PLASMA COAGULATION;  Surgeon: Robin Vaughn MD;  Location: [facility]     CA PATIENT HAS A CORONARY ARTERY STENT         Family history:  Family History   Problem Relation Age of Onset     Heart Disease Father        Medications:  Prior to Admission medications    Medication Sig Start Date End Date Taking? Authorizing Provider   amLODIPine (NORVASC) 5 MG tablet Take 5 mg by mouth daily    Yes Reported, Patient   aspirin 81 MG EC tablet Take 81 mg by mouth daily   Yes Reported, Patient   atorvastatin (LIPITOR) 20 MG tablet Take 20 mg by mouth daily    Yes Reported, Patient   Calcium Carb-Cholecalciferol (CALCIUM PLUS VITAMIN D3 PO) Take 1 tablet by mouth 2 times daily   Yes Reported, Patient   ferrous sulfate (IRON) 325 (65 FE) MG tablet Take 325 mg by mouth daily (with breakfast)   Yes Reported, Patient   fluticasone-vilanterol (BREO ELLIPTA) 100-25 MCG/INH oral inhaler Inhale 1 puff into the lungs daily   Yes Reported, Patient   metoprolol tartrate (LOPRESSOR) 50 MG tablet Take 50 mg by mouth 2 times daily    Yes Reported, Patient   Multiple Vitamins-Minerals (ICAPS PO) Take 1 capsule by mouth daily   Yes Reported, Patient   terazosin (HYTRIN) 10 MG capsule Take 10 mg by mouth daily    Yes Reported, Patient   Nitroglycerin (NITROTAB SL) Place 0.4 mg under the tongue every 5 minutes as needed for chest pain    Reported, Patient       Allergies:  The patientis allergic to niacin.  .  Social history:  Social History     Tobacco Use     Smoking status: Former Smoker     Types: Cigarettes     Last attempt to quit: 1968     Years since quittin.0   Substance Use Topics     Alcohol use: Not on file     Marital status: .    Review of Systems:    Constitutional: Negative for fever, chills and weight loss.   HENT: Negative for ear pain, nosebleeds,  congestion, sore throat, tinnitus and ear discharge.    Eyes: Negative for blurred vision, double vision, photophobia and pain.   Respiratory: Per HPI  Cardiovascular: Negative for chest pain, palpitations and orthopnea.   Gastrointestinal: Negative for heartburn, nausea, vomiting, abdominal pain and blood in stool.   Genitourinary: Negative for urgency, frequency and hematuria.   Musculoskeletal: Negative for myalgias, back pain and joint pain.   Neurological: Negative for tingling, speech change and headaches.   Endo/Heme/Allergies: Does not bruise/bleed easily.   Psychiatric/Behavioral: Negative for depression, suicidal ideas and hallucinations. The patient is not nervous/anxious.    A/P  #1 Syncope   #2 Left ribs 4-7 fractures    Multimodality pain control, aggressive pulmonary toilet.  She's only needing 2 L of supplemental O2 via NC and this has come down already to 1.5 since arriving on the floor.  Work up for syncope per primary

## 2019-12-22 NOTE — PROGRESS NOTES
S: HD #2 Syncope and multiple left sided rib fractures.  No overnight events, no acute issues, hemodynamically stable, afebrile, oxygen weaned down to 1 Liter.  States pain is controlled.    O:  Temp: 98.7  F (37.1  C) Temp  Min: 97.7  F (36.5  C)  Max: 98.7  F (37.1  C)  Resp: 20 Resp  Min: 14  Max: 24  SpO2: 93 % SpO2  Min: 87 %  Max: 99 %  Heart Rate: 73 Heart Rate  Min: 62  Max: 80  BP: 157/65 Systolic (24hrs), Av , Min:136 , Max:165   Diastolic (24hrs), Av, Min:58, Max:70  Gen: sleeping, easily awaken, NAD, laying comfortably in bed  Lungs: CTA B/L without wheezes  Heart: RRR no r/g/m    A/P  #1 Multiple left rib fractures    Instructed the patient on the importance of incentive spirometry.  She states she's using it 10x/hr.  We'll continue to wean the oxygen as appropriate.  Continue to watch for decompensation.  No indication for paravertebral block.  Surgery will continue to follow.

## 2019-12-22 NOTE — PLAN OF CARE
VSS. RR 20 at rest. Increases with exertion. Pt dyspneic with exertion due to rib fractures. BP slightly elevated, per norm. Scheduled BP meds administered. SaO2 mid 90s on O2. Pt arrived from ED on 2LPM. Writer weaned down to 1LPM. SaO2 in mid 90s at rest and dips into high 80s with movement. Pt c/o pain rated 1/10 at rest but stated with movement, she is at a 10/10.     When pt arrived to floor, pt attempted to get out of bed and ambulate self to bed but was unable to. Staff assisted/used slide board. For first void, pt agreed to use bedpan and tolerated well. Pt declined need for pain medications at rest. Scheduled lidocaine patch applied to L posterior ribs. At bedtime, pt agreed to try to get out of bed and use commode. Pt got out of bed Ax1 with a walker and tolerated well.     Lungs: clear but diminished. IS use encouraged. Abd: soft and nontender. Active bowel sounds. Diet: Regular diet tolerated without difficulty but pt didn't have much of an appetite and ate 50% of her soup. : pt continent of urine. Pt up Ax1.    PIV infusing NS at 75 mL/hr. Call light within reach. Pt calls appropriately for assistance.

## 2019-12-22 NOTE — PROGRESS NOTES
[facility]    Hospitalist Progress Note    Date of Service (when I saw the patient): 12/22/2019    Assessment & Plan     Closed fracture of multiple ribs of left side: Trauma consult by Dr. Merrill. She noted immediate left rib pain when she awoke on the floor. She denies any other pain, there are no other visible injures. She was able to ambulate after the fall without problems with exception of worsening rib pain.  -12/22: States pain well controlled with hydrocodone. Still requiring some oxygen though titrated down to 1 liter. Continue flutter/IS/CDB. Lidocaine, toradol and oral narcotics for pain control.        Syncope: True syncope with loss of consciousness reportedly of 1-2 minutes. She denies any symptoms prior to syncope, felt fine upon waking with exception of the left rib pain. Will get EKG, UA, place on cardiac monitor. She does have a cardiac history including MI and stents placed 10 years ago and aortic valve disease. Will get ECHO when available. She does report some ongoing fatigue over the last several months but no prior syncope.  -12/22 No ectopy on telemetry overnight. Blood pressures have been stable. Troponin negative x2. No acute changes on EKG. Will get ECHO in AM. Continue telemetry.        Asthma: Moderate. Well controlled on Breo. She denies use of rescue inhaler. She does have increased risk of poor outcome with these rib fractures with asthma but fortunately it is well controlled. Continue Breo and PRN albuterol.        Hypertension: On norvasc and metoprolol, pressures have been well controlled in the ED and at last clinic visit. Will continue home doses.        Panic disorder: Calm, she is not on any long term controller medications for this.        CAD (coronary artery disease): Stent 10 years ago. At that time she did have an MI. She has not had any cardiac problems since that time.      DVT Prophylaxis: Pneumatic Compression Devices  Code Status: Full  Code    Disposition: Expected discharge in 1-2 days once pain controlled, tolerating RA.    Daniela Red, CNP    Interval History   Awake,alert.  Denies feeling dyspneic, no chest pain, rib pain tolerable with pain medications. Denies abdominal pain or nausea.     -Data reviewed today: I reviewed all new labs and imaging results over the last 24 hours.     Physical Exam   Temp: 98.2  F (36.8  C) Temp src: Tympanic BP: 156/62 Pulse: 73 Heart Rate: 71 Resp: 20 SpO2: 92 % O2 Device: Nasal cannula Oxygen Delivery: 2 LPM  Vitals:    12/21/19 2035   Weight: 62 kg (136 lb 9.6 oz)     Vital Signs with Ranges  Temp:  [97.7  F (36.5  C)-98.7  F (37.1  C)] 98.2  F (36.8  C)  Pulse:  [68-80] 73  Heart Rate:  [62-80] 71  Resp:  [14-24] 20  BP: (136-165)/(58-70) 156/62  SpO2:  [87 %-99 %] 92 %  I/O last 3 completed shifts:  In: 1134 [P.O.:240; I.V.:894]  Out: 1275 [Urine:1275]    Peripheral IV 12/27/17 Right Hand (Active)   Number of days: 725       Peripheral IV 12/21/19 Left (Active)   Site Assessment WDL 12/22/2019  8:30 AM   Line Status Infusing 12/22/2019  8:30 AM   Phlebitis Scale 0-->no symptoms 12/22/2019  8:30 AM   Infiltration Scale 0 12/22/2019  8:30 AM   Infiltration Site Treatment Method  None 12/21/2019  5:00 PM   Extravasation? No 12/21/2019  5:00 PM   Number of days: 1     Line/device assessment(s) completed for medical necessity    Constitutional: Awake,alert, no acute distress  Respiratory: Diminished left mid-base, otherwise clear. Weak forced cough with encouragement.   Cardiovascular: HRR, + murmur, no peripheral edema.   GI: Soft,nontnder, bowel sounds hypoactive  Skin/Integumen: no bruising, rashes or open areas.        Medications     sodium chloride 75 mL/hr at 12/22/19 0442       amLODIPine  5 mg Oral Daily     aspirin  81 mg Oral Daily     docusate sodium  100 mg Oral BID     fluticasone-vilanterol  1 puff Inhalation Daily     lidocaine  1 patch Transdermal Q24h    And     lidocaine   Transdermal  Q8H     metoprolol tartrate  50 mg Oral BID     sodium chloride (PF)  3 mL Intracatheter Q8H       Data   Recent Labs   Lab 12/22/19  0513 12/21/19  1801 12/21/19  1313   WBC 6.8  --  8.5   HGB 12.5  --  14.3   MCV 95  --  95   *  --  174     --  138   POTASSIUM 3.6  --  4.1   CHLORIDE 107  --  104   CO2 26  --  27   BUN 16  --  20   CR 0.64  --  0.78   ANIONGAP 7  --  7   CATARINA 8.1*  --  9.3   GLC 88  --  122*   ALBUMIN  --   --  3.9   PROTTOTAL  --   --  7.3   BILITOTAL  --   --  0.7   ALKPHOS  --   --  102   ALT  --   --  34   AST  --   --  24   TROPI  --  <0.015 <0.015       Recent Results (from the past 24 hour(s))   CT Head w/o Contrast    Narrative    PROCEDURE: CT HEAD W/O CONTRAST     HISTORY: TIA, initial exam.    COMPARISON: None.    TECHNIQUE:  Helical images of the head from the foramen magnum to the  vertex were obtained without contrast.    FINDINGS: The ventricles and sulci are prominent, compatible with  mild, generalized volume loss. No acute intracranial hemorrhage, mass  effect, midline shift, hydrocephalus or basilar cystern effacement are  present.    The grey-white matter interface is preserved. Patchy hypoattenuation  within the supratentorial subcortical and periventricular white matter  is most compatible with microvascular ischemic change.     The calvarium is intact.  A slightly irregular filling defect is  present in the left maxillary sinus.      Impression    IMPRESSION: No acute intracranial hemorrhage or CT evidence of  transcortical ischemia.    Heterogeneous left maxillary sinus filling defect. Recommend follow-up  CT sinus with contrast , ideally in a few weeks.    VÍCTOR AMIN MD   Ribs XR, unilat 3 views + PA chest,  left    Narrative    PROCEDURE:  XR RIBS & CHEST LT 3VW    HISTORY: Left-sided rib pain post fall, .    COMPARISON:  11/15/2016    FINDINGS:  The cardiomediastinal contours are stable.  The trachea is midline.   There is calcific aortic  atherosclerosis.  Interstitial thickening is chronic. Costochondral calcifications are  present. No focal consolidation, effusion or pneumothorax.    There are acute fractures of the left fourth, fifth and sixth ribs  posteriorly.      Impression    IMPRESSION:      Acute fractures of the left fourth, fifth and sixth ribs posteriorly.    VÍCTOR AMIN MD   CT Chest (PE) Abdomen Pelvis w Contrast    Narrative    PROCEDURE:  CT CHEST PE ABDOMEN PELVIS W CONTRAST.    HISTORY:  Evaluate for pulmonary embolism.  Chest-abd-pelvis trauma,  moderate, blunt; Chest pain post trauma with elevated d-dimer.    TECHNIQUE:  Initial pre-contrast  and localizer images were  obtained.  Contrast enhanced helical CT pulmonary angiography was then  performed.  Helical CT images of the abdomen and pelvis were  performed. Routine transaxial and post-processed (multiplanar and/or  MIP) reformations were obtained.    COMPARISON:  11/15/2016    MEDS/CONTRAST: OMNIPAQUE 350 100 mL    PULMONARY CTA FINDINGS:  This is a diagnostic quality helical CT  pulmonary angiogram.  There is no acute pulmonary embolism to the  first subsegmental pulmonary artery level. The main pulmonary artery  is dilated.    OTHER FINDINGS:      There is a low-density right thyroid nodule measuring 4 cm resulting  in slight right to left midline shift of trachea. The heart is  enlarged. There is a moderate hiatal hernia.    Trace bilateral effusions are present. No pneumothorax is present. The  central airways are unremarkable. No focal consolidation or  intrapulmonary mass is identified.    Acute fractures of the left posterior fourth, fifth, sixth and seventh  ribs are seen.    Liver, gallbladder, pancreas, spleen and adrenal glands demonstrate no  evidence of trauma or other acute abnormality. The infrarenal aorta is  dilated up to 3.3 cm. Symmetric nephrograms are present without  hydronephrosis.      Impression    IMPRESSION:    No acute pulmonary emboli  to the subsegmental level.    Cardiomegaly. Pulmonary hypertension.    Acute posterior left fourth, fifth, sixth and seventh rib fractures.  No pneumothorax.    No evidence of intra-abdominal organ blunt injury.    VÍCTOR AMIN MD

## 2019-12-22 NOTE — PROGRESS NOTES
Assessment completed see flowsheet.    LOC: alert, oriented, pleasant   Others present: Patient     Dx: multiple closed rib fractures, hypoxia, syncope   Chronic Disease Management: HTN, asthma    Lives with: alone   [facility]   Transportation: YES Independently drives, reliable vehicle     Primary PCP: Theodore Lou  Insurance:  Medicare, [employer]  Medicare ANGEL letter reviewed with Frances.    Support System:  Family, neighbors  Homecare/PCA: not connected   /County Services:   Not connected   : NO      How was the [facility] notification completed: n/a    Health Care Directive: YES on file; new form provided as health care agents need to be updated.   Guardian: no   POA: no     Pharmacy: [facility]  Meds management: YES independently manages; utilizes a pill box     Adequate Resources for needs (housing, utilities, food/med): YES  Household chores: independent  Work/community/social activity: YES as desired.  Enjoys dancing, playing BINGO and cards     ADLs: independent   Ambulation:independent   Falls: denies   Nutrition: no concerns   Sleep: no concerns     Equipment used: shower chair       Oxygen supplier: no      Does the supplier have valid oxygen orders: n/a    Mental health: denies   Substance abuse: very little alcohol use   Exposure to violence/abuse: denies   Stressors: denies     Able to Return to Prior Living Arrangements: YES    Choice of Vendor: n/a     Barriers: no barriers identified     LOW: medium risk    Plan: return home via family

## 2019-12-23 ENCOUNTER — ANESTHESIA EVENT (OUTPATIENT)
Dept: MEDSURG UNIT | Facility: HOSPITAL | Age: 84
DRG: 185 | End: 2019-12-23
Payer: MEDICARE

## 2019-12-23 ENCOUNTER — ANESTHESIA (OUTPATIENT)
Dept: MEDSURG UNIT | Facility: HOSPITAL | Age: 84
DRG: 185 | End: 2019-12-23
Payer: MEDICARE

## 2019-12-23 ENCOUNTER — HOSPITAL ENCOUNTER (INPATIENT)
Dept: CARDIOLOGY | Facility: HOSPITAL | Age: 84
DRG: 185 | End: 2019-12-23
Attending: NURSE PRACTITIONER
Payer: MEDICARE

## 2019-12-23 LAB
ALBUMIN SERPL-MCNC: 3.1 G/DL (ref 3.4–5)
ALP SERPL-CCNC: 74 U/L (ref 40–150)
ALT SERPL W P-5'-P-CCNC: 23 U/L (ref 0–50)
ANION GAP SERPL CALCULATED.3IONS-SCNC: 7 MMOL/L (ref 3–14)
AST SERPL W P-5'-P-CCNC: 17 U/L (ref 0–45)
BACTERIA SPEC CULT: NORMAL
BILIRUB SERPL-MCNC: 0.8 MG/DL (ref 0.2–1.3)
BUN SERPL-MCNC: 15 MG/DL (ref 7–30)
CALCIUM SERPL-MCNC: 8.3 MG/DL (ref 8.5–10.1)
CHLORIDE SERPL-SCNC: 105 MMOL/L (ref 94–109)
CO2 SERPL-SCNC: 27 MMOL/L (ref 20–32)
CREAT SERPL-MCNC: 0.64 MG/DL (ref 0.52–1.04)
ERYTHROCYTE [DISTWIDTH] IN BLOOD BY AUTOMATED COUNT: 12.6 % (ref 10–15)
GFR SERPL CREATININE-BSD FRML MDRD: 81 ML/MIN/{1.73_M2}
GLUCOSE SERPL-MCNC: 94 MG/DL (ref 70–99)
HCT VFR BLD AUTO: 37.3 % (ref 35–47)
HGB BLD-MCNC: 12.8 G/DL (ref 11.7–15.7)
MCH RBC QN AUTO: 32.4 PG (ref 26.5–33)
MCHC RBC AUTO-ENTMCNC: 34.3 G/DL (ref 31.5–36.5)
MCV RBC AUTO: 94 FL (ref 78–100)
PLATELET # BLD AUTO: 132 10E9/L (ref 150–450)
POTASSIUM SERPL-SCNC: 3.6 MMOL/L (ref 3.4–5.3)
PROT SERPL-MCNC: 6.1 G/DL (ref 6.8–8.8)
RBC # BLD AUTO: 3.95 10E12/L (ref 3.8–5.2)
SODIUM SERPL-SCNC: 139 MMOL/L (ref 133–144)
SPECIMEN SOURCE: NORMAL
WBC # BLD AUTO: 6.1 10E9/L (ref 4–11)

## 2019-12-23 PROCEDURE — 40000275 ZZH STATISTIC RCP TIME EA 10 MIN

## 2019-12-23 PROCEDURE — 25000132 ZZH RX MED GY IP 250 OP 250 PS 637: Performed by: SURGERY

## 2019-12-23 PROCEDURE — 85027 COMPLETE CBC AUTOMATED: CPT | Performed by: NURSE PRACTITIONER

## 2019-12-23 PROCEDURE — 99232 SBSQ HOSP IP/OBS MODERATE 35: CPT | Mod: 25 | Performed by: NURSE PRACTITIONER

## 2019-12-23 PROCEDURE — 99232 SBSQ HOSP IP/OBS MODERATE 35: CPT | Performed by: SURGERY

## 2019-12-23 PROCEDURE — 93306 TTE W/DOPPLER COMPLETE: CPT | Mod: 26 | Performed by: INTERNAL MEDICINE

## 2019-12-23 PROCEDURE — 25000128 H RX IP 250 OP 636: Performed by: SURGERY

## 2019-12-23 PROCEDURE — 94640 AIRWAY INHALATION TREATMENT: CPT

## 2019-12-23 PROCEDURE — 25000128 H RX IP 250 OP 636: Performed by: NURSE ANESTHETIST, CERTIFIED REGISTERED

## 2019-12-23 PROCEDURE — 36415 COLL VENOUS BLD VENIPUNCTURE: CPT | Performed by: NURSE PRACTITIONER

## 2019-12-23 PROCEDURE — 64461 PVB THORACIC SINGLE INJ SITE: CPT | Performed by: NURSE ANESTHETIST, CERTIFIED REGISTERED

## 2019-12-23 PROCEDURE — 80053 COMPREHEN METABOLIC PANEL: CPT | Performed by: NURSE PRACTITIONER

## 2019-12-23 PROCEDURE — 93306 TTE W/DOPPLER COMPLETE: CPT | Mod: TC

## 2019-12-23 PROCEDURE — 12000000 ZZH R&B MED SURG/OB

## 2019-12-23 PROCEDURE — 25000132 ZZH RX MED GY IP 250 OP 250 PS 637: Performed by: NURSE PRACTITIONER

## 2019-12-23 PROCEDURE — 3E0T3BZ INTRODUCTION OF ANESTHETIC AGENT INTO PERIPHERAL NERVES AND PLEXI, PERCUTANEOUS APPROACH: ICD-10-PCS | Performed by: NURSE ANESTHETIST, CERTIFIED REGISTERED

## 2019-12-23 RX ORDER — CYCLOBENZAPRINE HCL 5 MG
5 TABLET ORAL 3 TIMES DAILY
Status: DISCONTINUED | OUTPATIENT
Start: 2019-12-23 | End: 2019-12-24 | Stop reason: HOSPADM

## 2019-12-23 RX ORDER — DEXAMETHASONE SODIUM PHOSPHATE 10 MG/ML
INJECTION, SOLUTION INTRAMUSCULAR; INTRAVENOUS PRN
Status: DISCONTINUED | OUTPATIENT
Start: 2019-12-23 | End: 2019-12-23

## 2019-12-23 RX ORDER — KETOROLAC TROMETHAMINE 15 MG/ML
15 INJECTION, SOLUTION INTRAMUSCULAR; INTRAVENOUS EVERY 6 HOURS
Status: DISCONTINUED | OUTPATIENT
Start: 2019-12-23 | End: 2019-12-24 | Stop reason: HOSPADM

## 2019-12-23 RX ORDER — ROPIVACAINE HYDROCHLORIDE 2 MG/ML
INJECTION, SOLUTION EPIDURAL; INFILTRATION; PERINEURAL PRN
Status: DISCONTINUED | OUTPATIENT
Start: 2019-12-23 | End: 2019-12-23

## 2019-12-23 RX ORDER — HYDROCODONE BITARTRATE AND ACETAMINOPHEN 5; 325 MG/1; MG/1
1 TABLET ORAL EVERY 4 HOURS PRN
Status: DISCONTINUED | OUTPATIENT
Start: 2019-12-23 | End: 2019-12-24 | Stop reason: HOSPADM

## 2019-12-23 RX ORDER — ACETAMINOPHEN 325 MG/1
650 TABLET ORAL EVERY 6 HOURS
Status: DISCONTINUED | OUTPATIENT
Start: 2019-12-23 | End: 2019-12-24 | Stop reason: HOSPADM

## 2019-12-23 RX ORDER — POLYETHYLENE GLYCOL 3350 17 G/17G
17 POWDER, FOR SOLUTION ORAL DAILY
Status: DISCONTINUED | OUTPATIENT
Start: 2019-12-23 | End: 2019-12-24 | Stop reason: HOSPADM

## 2019-12-23 RX ADMIN — ROPIVACAINE HYDROCHLORIDE 20 ML: 2 INJECTION, SOLUTION EPIDURAL; INFILTRATION at 08:07

## 2019-12-23 RX ADMIN — DEXAMETHASONE SODIUM PHOSPHATE 5 MG: 10 INJECTION, SOLUTION INTRAMUSCULAR; INTRAVENOUS at 08:03

## 2019-12-23 RX ADMIN — ROPIVACAINE HYDROCHLORIDE 20 ML: 2 INJECTION, SOLUTION EPIDURAL; INFILTRATION at 08:03

## 2019-12-23 RX ADMIN — FLUTICASONE FUROATE AND VILANTEROL TRIFENATATE 1 PUFF: 100; 25 POWDER RESPIRATORY (INHALATION) at 08:58

## 2019-12-23 RX ADMIN — ACETAMINOPHEN 650 MG: 325 TABLET, FILM COATED ORAL at 08:13

## 2019-12-23 RX ADMIN — ASPIRIN 81 MG: 81 TABLET, COATED ORAL at 08:14

## 2019-12-23 RX ADMIN — HYDROCODONE BITARTRATE AND ACETAMINOPHEN 1 TABLET: 5; 325 TABLET ORAL at 00:05

## 2019-12-23 RX ADMIN — AMLODIPINE BESYLATE 5 MG: 5 TABLET ORAL at 08:13

## 2019-12-23 RX ADMIN — DEXAMETHASONE SODIUM PHOSPHATE 5 MG: 10 INJECTION, SOLUTION INTRAMUSCULAR; INTRAVENOUS at 08:07

## 2019-12-23 RX ADMIN — Medication 1 PATCH: at 12:29

## 2019-12-23 RX ADMIN — Medication 25 MCG: at 08:03

## 2019-12-23 RX ADMIN — POLYETHYLENE GLYCOL 3350 17 G: 17 POWDER, FOR SOLUTION ORAL at 10:23

## 2019-12-23 RX ADMIN — METOPROLOL TARTRATE 50 MG: 50 TABLET, FILM COATED ORAL at 21:39

## 2019-12-23 RX ADMIN — DOCUSATE SODIUM 100 MG: 100 CAPSULE, LIQUID FILLED ORAL at 21:40

## 2019-12-23 RX ADMIN — Medication 25 MCG: at 08:07

## 2019-12-23 RX ADMIN — KETOROLAC TROMETHAMINE 15 MG: 15 INJECTION, SOLUTION INTRAMUSCULAR; INTRAVENOUS at 08:47

## 2019-12-23 RX ADMIN — CYCLOBENZAPRINE 5 MG: 5 TABLET, FILM COATED ORAL at 19:12

## 2019-12-23 RX ADMIN — CYCLOBENZAPRINE 5 MG: 5 TABLET, FILM COATED ORAL at 12:25

## 2019-12-23 RX ADMIN — ACETAMINOPHEN 650 MG: 325 TABLET, FILM COATED ORAL at 19:12

## 2019-12-23 RX ADMIN — METOPROLOL TARTRATE 50 MG: 50 TABLET, FILM COATED ORAL at 08:13

## 2019-12-23 RX ADMIN — ACETAMINOPHEN 650 MG: 325 TABLET, FILM COATED ORAL at 14:08

## 2019-12-23 RX ADMIN — CYCLOBENZAPRINE 5 MG: 5 TABLET, FILM COATED ORAL at 08:14

## 2019-12-23 RX ADMIN — DOCUSATE SODIUM 100 MG: 100 CAPSULE, LIQUID FILLED ORAL at 08:14

## 2019-12-23 RX ADMIN — KETOROLAC TROMETHAMINE 15 MG: 15 INJECTION, SOLUTION INTRAMUSCULAR; INTRAVENOUS at 15:36

## 2019-12-23 RX ADMIN — KETOROLAC TROMETHAMINE 15 MG: 15 INJECTION, SOLUTION INTRAMUSCULAR; INTRAVENOUS at 21:39

## 2019-12-23 ASSESSMENT — ACTIVITIES OF DAILY LIVING (ADL)
ADLS_ACUITY_SCORE: 15
ADLS_ACUITY_SCORE: 17

## 2019-12-23 NOTE — PROVIDER NOTIFICATION
Text page placed to Dr. Chapin regarding oxygen parameters. O2 level to remain above 90% per orders.

## 2019-12-23 NOTE — PROGRESS NOTES
S: HD #3 Syncope with fall resulting in multiple left sided rib fractures 4-7.  Requiring increasing supplemental oxygen.  C/O Left sided back pain that radiates to her left breast.      O:  Temp: 98.1  F (36.7  C) Temp  Min: 97.1  F (36.2  C)  Max: 98.8  F (37.1  C)  Resp: 18 Resp  Min: 14  Max: 20  SpO2: 93 % SpO2  Min: 85 %  Max: 94 %  Heart Rate: 64 Heart Rate  Min: 59  Max: 77  BP: 158/62 Systolic (24hrs), Av , Min:142 , Max:158   Diastolic (24hrs), Av, Min:56, Max:66  PO: 480 mL  UOP: 1075 mL  Gen: AAOx4, moderate distress, hunched over perez stand wincing in pain  Lungs: using accessory muscles slightly, Decreased breath sounds Upper left, CTA B/L lower lung fields    Results for orders placed or performed during the hospital encounter of 19 (from the past 24 hour(s))   CBC with platelets   Result Value Ref Range    WBC 6.1 4.0 - 11.0 10e9/L    RBC Count 3.95 3.8 - 5.2 10e12/L    Hemoglobin 12.8 11.7 - 15.7 g/dL    Hematocrit 37.3 35.0 - 47.0 %    MCV 94 78 - 100 fl    MCH 32.4 26.5 - 33.0 pg    MCHC 34.3 31.5 - 36.5 g/dL    RDW 12.6 10.0 - 15.0 %    Platelet Count 132 (L) 150 - 450 10e9/L   Comprehensive metabolic panel   Result Value Ref Range    Sodium 139 133 - 144 mmol/L    Potassium 3.6 3.4 - 5.3 mmol/L    Chloride 105 94 - 109 mmol/L    Carbon Dioxide 27 20 - 32 mmol/L    Anion Gap 7 3 - 14 mmol/L    Glucose 94 70 - 99 mg/dL    Urea Nitrogen 15 7 - 30 mg/dL    Creatinine 0.64 0.52 - 1.04 mg/dL    GFR Estimate 81 >60 mL/min/[1.73_m2]    GFR Estimate If Black >90 >60 mL/min/[1.73_m2]    Calcium 8.3 (L) 8.5 - 10.1 mg/dL    Bilirubin Total 0.8 0.2 - 1.3 mg/dL    Albumin 3.1 (L) 3.4 - 5.0 g/dL    Protein Total 6.1 (L) 6.8 - 8.8 g/dL    Alkaline Phosphatase 74 40 - 150 U/L    ALT 23 0 - 50 U/L    AST 17 0 - 45 U/L       A/P  #1 Syncope  #2 Fall  #3 Multiple left rib fractures    Obviously we need better pain control.  I've now scheduled tylenol, toradol, flexeril and lidoderm patches.  She also  has the norco.  Spoke with nursing about scheduling everything so that she gets something ever 2-3 hours. Asked anesthesia for a paravertebral block.  Hopefully this will allow her to start back on aggressive pulmonary toliet.  She needs to be watched closely as she looks like she might start getting into a respiratory distress, and if she does, would benefit from CPAP. I'll check again later this morning to see if the pain has improved.  Low threshold to repeat cxr.

## 2019-12-23 NOTE — ANESTHESIA CARE TRANSFER NOTE
Patient: Frances Gilliam    * No procedures listed *    Diagnosis: * No pre-op diagnosis entered *  Diagnosis Additional Information: No value filed.    Anesthesia Type:   No value filed.     Note:    Patient transferred to:Medical/Surgical Unit  Handoff Report: Identifed the Patient, Identified the Reponsible Provider, Reviewed the pertinent medical history, Discussed the surgical course, Reviewed Intra-OP anesthesia mangement and issues during anesthesia, Set expectations for post-procedure period and Allowed opportunity for questions and acknowledgement of understanding      Vitals: (Last set prior to Anesthesia Care Transfer)              Electronically Signed By: DARIELA Tomas CRNA  December 23, 2019  9:55 AM

## 2019-12-23 NOTE — PLAN OF CARE
"Reason for hospital stay:  Multiple rib Fractures, Left side  Living situation PTA: Home-alone, does have family and friends that stop by.     Most recent vitals: /61   Pulse 68   Temp 98.6  F (37  C) (Tympanic)   Resp 18   Ht 1.499 m (4' 11\")   Wt 62 kg (136 lb 9.6 oz)   SpO2 94%   BMI 27.59 kg/m      Pain Management:  Complaints of pain on initial assessment today. Pain in left, upper rib cage area and radiated to the back. Rated a 5/10.  Pt did get perivertebral block this am. Patient has reported improvement with pain.  Reports no pain currently. Also receives scheduled tylenol, flexeril, Toradol, and a lidocaine patch.     LOC:  Alert and oriented x 4.     Cardiac:  Per ICU teley, SR 70s 1st degree AV block.    Respiratory:  Patient was guarded and taking shallow breaths at beginning of shift which has resolved with pain management via perivertebral block. Patient diminished at RLL and LLL. Clear in upper lobes. Does have some dyspnea on exertion. Non productive, infrequent cough. Using IS. Coughing and deep breathing with encouragement. Patient currently weaned to LPM 1 LPM via nasal cannula. 02 saturation maintaining above SpO2 above 90% . Attempted to wean off oxygen with O2 saturation decreased to 86-87%.     GI:  Bowel sounds hypoactive in all quadrants. Patient has complaints of constipation. Patient started on Miralax. No BM this shift.     :  Voiding without difficulty.     Skin Issues:  Scattered bruises.     IVF:  SL    ABX:  N/A    Nutrition: Regular Diet    ADL's:  Up ad hang    Ambulation:Standy by assist  Face to face report given with opportunity to observe patient.    Report given to Suman Jones RN   12/24/2019  6:57 AM          12/23/2019  2:05 PM  Leia Jones RN    "

## 2019-12-23 NOTE — PLAN OF CARE
VSS. Pt afebrile. Pulmonary hygiene promoted. RR 20. SaO2 mid 90s while awake on 1/2LPM. With sleep, SaO2 dips into low 90s. Writer increased O2 to 1LPM. Pt ambulated without O2 and SaO2 dipped into 80s. Pt was neither dyspneic nor short of breath. O2 reapplied. Pt stated that as long as she isn't moving, she is hardly having any pain. PRN toradol administered at 1641 as a prophylactic measure for ambulation.     Lungs: clear. Compared to assessment yesterday, lung sounds not diminished. Equal aeration throughout. IS and flutter valve use encourage and pt reports independently using. Abd: soft and nontender. Active bowel sounds. Regular diet tolerated without difficulty. : pt voiding without issues. Pt up SBAx1. Pt ambulated in halls and tolerated well.    PIV saline-locked. Call light within reach.

## 2019-12-23 NOTE — PLAN OF CARE
Face to face report given with opportunity to observe patient.    Report given to Kaelyn HOWE RN.    Senait Cabrera RN   12/22/2019  11:10 PM

## 2019-12-23 NOTE — ANESTHESIA PROCEDURE NOTES
Peripheral nerve/Neuraxial procedure note : Paravertebral  Pre-Procedure    Location:   Procedure Times:12/23/2019 8:00 AM and 12/23/2019 8:08 AM  Pre-Anesthestic Checklist: patient identified, IV checked, site marked, risks and benefits discussed, informed consent, monitors and equipment checked, pre-op evaluation, at physician/surgeon's request and post-op pain management    Timeout  Correct Patient: Yes   Correct Procedure: Yes   Correct Site: Yes   Correct Laterality: Yes   Correct Position: Yes   Site Marked: Yes   .   Procedure Documentation    .    Procedure: Paravertebral, left.   Patient Position:sitting Insertion Site:T4-5, T7-8   Ultrasound used to identify targeted nerve, plexus, or vascular marker and placed a needle adjacent to it., Ultrasound was used to visualize the spread of the anesthetic in close proximity to the above stated nerve. A permanent image is entered into the patient's record.  Patient Prep/Sterile Barriers; chlorhexidine gluconate and isopropyl alcohol.  .  Needle: insulated, short bevel   Needle Gauge: 20.    Needle Length (Inches) 4   Insertion Method: Single Shot.        Assessment/Narrative  Paresthesias: No.  Injection made incrementally with aspirations every 5 mL..  The placement was negative for: blood aspirated, painful injection and site bleeding.  Bolus given via needle..   Secured via.   Complications: none.

## 2019-12-23 NOTE — ANESTHESIA POSTPROCEDURE EVALUATION
Patient: Frances Gilliam    * No procedures listed *    Diagnosis:* No pre-op diagnosis entered *  Diagnosis Additional Information: No value filed.    Anesthesia Type:  No value filed.    Note:  Anesthesia Post Evaluation    Patient location during evaluation: Floor  Patient participation: Able to fully participate in evaluation  Level of consciousness: awake and alert  Pain management: adequate  Airway patency: patent  Cardiovascular status: acceptable  Respiratory status: acceptable  Hydration status: acceptable  PONV: none             Last vitals:  Vitals:    12/23/19 0837 12/23/19 0850 12/23/19 0900   BP: 120/52 122/55 108/56   Pulse:      Resp:      Temp:      SpO2: (!) 91% 93% 95%         Electronically Signed By: DARIELA Tomas CRNA  December 23, 2019  9:56 AM

## 2019-12-23 NOTE — PLAN OF CARE
"Reason for hospital stay:  Multiple rib fracture of left side  Living situation PTA: Home-alone  Most recent vitals: /60   Pulse 68   Temp 98  F (36.7  C) (Tympanic)   Resp 18   Ht 1.499 m (4' 11\")   Wt 62 kg (136 lb 9.6 oz)   SpO2 (!) 91%   BMI 27.59 kg/m      Pain Management:  Pt reports pain at beginning of shift describing it as burning. Pt rated 4/10 and given PRN Norco with good results. Pt denies pain at follow up.   LOC:  A&O x4-- throughout the shift pt appeared to be confused at times making odd statements, but when questioned she was A&Ox4.   Cardiac:  SR 60's with 1st AV block per ICU telemetry  Respiratory:  Posterior lungs sounds clear equal bilaterally. Diminished in LLL. Sats decreasing mid 80's on 1 LPM-- increased to 2-2.5 LPM sats maintaining at 89-91%. Text page placed to Dr. Chapin regarding parameters for o2.    GI:  Bowel sounds audible and active all four quadrants, denies N/V.   :  Adequate output throughout the shift   Skin Issues:  No open areas or bruising noted    IVF:  SL    Nutrition: Combination, 2 g NA diet  ADL's:  Assist of 1  Ambulation: Assist of 1 with walker  Safety:  Call light within reach, calls appropriately    Comments:    Face to face report given with opportunity to observe patient.    Report given to Leia Rivas   12/23/2019  7:17 AM      "

## 2019-12-23 NOTE — ANESTHESIA PREPROCEDURE EVALUATION
Anesthesia Pre-Procedure Evaluation    Patient: Frances Gilliam   MRN: [id number] : 1934          Preoperative Diagnosis: * No pre-op diagnosis entered *    * No procedures listed *    Past Medical History:   Diagnosis Date     Age-related macular degeneration      Aortic valve sclerosis      CAD (coronary artery disease)      Essential thrombocytosis (H)      GI hemorrhage      History of MI (myocardial infarction)      Hypertension      Iron deficiency anemia      Mixed hyperlipidemia      Peripheral vascular disease (H)      Vitamin B 12 deficiency      Vitamin D deficiency      Past Surgical History:   Procedure Laterality Date     ESOPHAGOSCOPY, GASTROSCOPY, DUODENOSCOPY (EGD), COMBINED N/A 2017    Procedure: COMBINED ESOPHAGOSCOPY, GASTROSCOPY, DUODENOSCOPY (EGD);  UPPER ENDOSCOPY WITH BIOPSIES, ENTEROSCOPY WITH  SMALL BOWEL ARGON PLASMA COAGULATION;  Surgeon: Robin Vaughn MD;  Location: [facility]     [place] PATIENT HAS A CORONARY ARTERY STENT                          Lab Results   Component Value Date    WBC 6.1 2019    HGB 12.8 2019    HCT 37.3 2019     (L) 2019    CRP <2.9 2019     2019    POTASSIUM 3.6 2019    CHLORIDE 105 2019    CO2 27 2019    BUN 15 2019    CR 0.64 2019    GLC 94 2019    CATARINA 8.3 (L) 2019    MAG 1.6 (L) 2014    ALBUMIN 3.1 (L) 2019    PROTTOTAL 6.1 (L) 2019    ALT 23 2019    AST 17 2019    ALKPHOS 74 2019    BILITOTAL 0.8 2019    LIPASE 42.0 2014    AMYLASE 37 2014    INR 1.1 2014    T4 1.40 2014       Preop Vitals  BP Readings from Last 3 Encounters:   19 108/56   17 125/71   10/07/16 112/52    Pulse Readings from Last 3 Encounters:   19 68   17 75   16 59      Resp Readings from Last 3 Encounters:   19 18   17 20   10/07/16 20    SpO2 Readings from Last 3 Encounters:   19 95%  "  12/27/17 94%   10/06/16 93%      Temp Readings from Last 1 Encounters:   12/23/19 98.6  F (37  C) (Tympanic)    Ht Readings from Last 1 Encounters:   12/21/19 1.499 m (4' 11\")      Wt Readings from Last 1 Encounters:   12/21/19 62 kg (136 lb 9.6 oz)    Estimated body mass index is 27.59 kg/m  as calculated from the following:    Height as of this encounter: 1.499 m (4' 11\").    Weight as of this encounter: 62 kg (136 lb 9.6 oz).       Anesthesia Plan  Procedure only, no anesthetic delivered             Postoperative Care      Consents                 Juliano Linder, APRN CRNA  "

## 2019-12-23 NOTE — PROGRESS NOTES
Anesthesia was able to perform a perivertebral block this morning.  Per the nurse, patient had significant improvement of her pain and inspiratory effort.  She was able to wean down the supplemental oxygen.  I went to see the patient and she was resting comfortably, SpO2 90% 1L NC without effort.  Patient respiratory status has improved.

## 2019-12-23 NOTE — PROGRESS NOTES
[facility]    Hospitalist Progress Note    Date of Service (when I saw the patient): 12/23/2019    Assessment & Plan     Closed fracture of multiple ribs of left side: Trauma consult by Dr. Merrill. She noted immediate left rib pain when she awoke on the floor. She denies any other pain, there are no other visible injures. She was able to ambulate after the fall without problems with exception of worsening rib pain.  -12/22: States pain well controlled with hydrocodone. Still requiring some oxygen though titrated down to 1 liter. Continue flutter/IS/CDB. Lidocaine, toradol and oral narcotics for pain control.   -12/23: pain better last night but severe this morning. Anesthesia for block today. Continue aggressive pulmonary toilet.        Syncope: True syncope with loss of consciousness reportedly of 1-2 minutes. She denies any symptoms prior to syncope, felt fine upon waking with exception of the left rib pain. Will get EKG, UA, place on cardiac monitor. She does have a cardiac history including MI and stents placed 10 years ago and aortic valve disease. Will get ECHO when available. She does report some ongoing fatigue over the last several months but no prior syncope.  -12/22 No ectopy on telemetry overnight. Blood pressures have been stable. Troponin negative x2. No acute changes on EKG. Will get ECHO in AM. Continue telemetry.   -12/23: Echo completed, result pending. No symptoms since arrival.     AMAURY: reports that she failed a sleep study in the past, but when they attempted a trial of cpap she could not tolerate it and has not used anything since. She does desat at night, will need oxygen overnight while her for sure. Will need to consider nighttime oxygen at home as well.       Asthma: Moderate. Well controlled on Breo. She denies use of rescue inhaler. She does have increased risk of poor outcome with these rib fractures with asthma but fortunately it is well controlled. Continue Breo and PRN  albuterol.        Hypertension: On norvasc and metoprolol, pressures have been well controlled in the ED and at last clinic visit. Will continue home doses.        Panic disorder: Calm, she is not on any long term controller medications for this.        CAD (coronary artery disease): Stent 10 years ago. At that time she did have an MI. She has not had any cardiac problems since that time.      DVT Prophylaxis: Pneumatic Compression Devices  Code Status: Full Code    Disposition: Expected discharge in 1-2 days once pain controlled, tolerating RA.    Daniela Red, CNP    Interval History   Awake,alert.  Denies feeling dyspneic, has signficant posterior rib pain while laying down and trying to cough. Denies abdominal pain or nausea.     -Data reviewed today: I reviewed all new labs and imaging results over the last 24 hours.     Physical Exam   Temp: 98.6  F (37  C) Temp src: Tympanic BP: 146/61 Pulse: 68 Heart Rate: 62 Resp: 18 SpO2: 94 % O2 Device: Nasal cannula Oxygen Delivery: 1.5 LPM  Vitals:    12/21/19 2035   Weight: 62 kg (136 lb 9.6 oz)     Vital Signs with Ranges  Temp:  [97.1  F (36.2  C)-98.8  F (37.1  C)] 98.6  F (37  C)  Pulse:  [68-70] 68  Heart Rate:  [59-77] 62  Resp:  [14-20] 18  BP: (108-158)/(46-66) 146/61  SpO2:  [87 %-97 %] 94 %  I/O last 3 completed shifts:  In: 480 [P.O.:480]  Out: 1075 [Urine:1075]    Peripheral IV 12/27/17 Right Hand (Active)   Number of days: 726       Peripheral IV 12/21/19 Left (Active)   Site Assessment WDL 12/23/2019  7:23 AM   Line Status Saline locked 12/23/2019  7:23 AM   Phlebitis Scale 0-->no symptoms 12/23/2019  7:23 AM   Infiltration Scale 0 12/22/2019  4:40 PM   Infiltration Site Treatment Method  None 12/23/2019  7:23 AM   Extravasation? No 12/22/2019  4:40 PM   Number of days: 2     Line/device assessment(s) completed for medical necessity    Constitutional: Awake,alert, no acute distress  Respiratory: Diminished left mid-base, otherwise clear-improved air  flow today compared to yesterday.  Cardiovascular: HRR, + murmur, no peripheral edema.   GI: Soft,nontnder, bowel sounds hypoactive  Skin/Integumen: Scattered bruising left arm and shoulder, rashes or open areas.        Medications       acetaminophen  650 mg Oral Q6H     amLODIPine  5 mg Oral Daily     aspirin  81 mg Oral Daily     cyclobenzaprine  5 mg Oral TID     docusate sodium  100 mg Oral BID     fluticasone-vilanterol  1 puff Inhalation Daily     ketorolac  15 mg Intravenous Q6H     lidocaine  1 patch Transdermal Q24h    And     lidocaine   Transdermal Q8H     metoprolol tartrate  50 mg Oral BID     polyethylene glycol  17 g Oral Daily     sodium chloride (PF)  3 mL Intracatheter Q8H       Data   Recent Labs   Lab 12/23/19  0533 12/22/19  0513 12/21/19  1801 12/21/19  1313   WBC 6.1 6.8  --  8.5   HGB 12.8 12.5  --  14.3   MCV 94 95  --  95   * 138*  --  174    140  --  138   POTASSIUM 3.6 3.6  --  4.1   CHLORIDE 105 107  --  104   CO2 27 26  --  27   BUN 15 16  --  20   CR 0.64 0.64  --  0.78   ANIONGAP 7 7  --  7   CATARINA 8.3* 8.1*  --  9.3   GLC 94 88  --  122*   ALBUMIN 3.1*  --   --  3.9   PROTTOTAL 6.1*  --   --  7.3   BILITOTAL 0.8  --   --  0.7   ALKPHOS 74  --   --  102   ALT 23  --   --  34   AST 17  --   --  24   TROPI  --   --  <0.015 <0.015       No results found for this or any previous visit (from the past 24 hour(s)).

## 2019-12-24 VITALS
RESPIRATION RATE: 16 BRPM | HEIGHT: 59 IN | HEART RATE: 68 BPM | SYSTOLIC BLOOD PRESSURE: 150 MMHG | TEMPERATURE: 98 F | DIASTOLIC BLOOD PRESSURE: 58 MMHG | BODY MASS INDEX: 27.54 KG/M2 | WEIGHT: 136.6 LBS | OXYGEN SATURATION: 96 %

## 2019-12-24 LAB
ANION GAP SERPL CALCULATED.3IONS-SCNC: 5 MMOL/L (ref 3–14)
BUN SERPL-MCNC: 19 MG/DL (ref 7–30)
CALCIUM SERPL-MCNC: 8.7 MG/DL (ref 8.5–10.1)
CHLORIDE SERPL-SCNC: 105 MMOL/L (ref 94–109)
CO2 SERPL-SCNC: 28 MMOL/L (ref 20–32)
CREAT SERPL-MCNC: 0.66 MG/DL (ref 0.52–1.04)
ERYTHROCYTE [DISTWIDTH] IN BLOOD BY AUTOMATED COUNT: 12.2 % (ref 10–15)
GFR SERPL CREATININE-BSD FRML MDRD: 80 ML/MIN/{1.73_M2}
GLUCOSE SERPL-MCNC: 133 MG/DL (ref 70–99)
HCT VFR BLD AUTO: 36.8 % (ref 35–47)
HGB BLD-MCNC: 12.8 G/DL (ref 11.7–15.7)
MCH RBC QN AUTO: 32.5 PG (ref 26.5–33)
MCHC RBC AUTO-ENTMCNC: 34.8 G/DL (ref 31.5–36.5)
MCV RBC AUTO: 93 FL (ref 78–100)
PLATELET # BLD AUTO: 142 10E9/L (ref 150–450)
POTASSIUM SERPL-SCNC: 4.3 MMOL/L (ref 3.4–5.3)
RBC # BLD AUTO: 3.94 10E12/L (ref 3.8–5.2)
SODIUM SERPL-SCNC: 138 MMOL/L (ref 133–144)
WBC # BLD AUTO: 7.3 10E9/L (ref 4–11)

## 2019-12-24 PROCEDURE — 99239 HOSP IP/OBS DSCHRG MGMT >30: CPT | Performed by: INTERNAL MEDICINE

## 2019-12-24 PROCEDURE — 25000132 ZZH RX MED GY IP 250 OP 250 PS 637: Performed by: SURGERY

## 2019-12-24 PROCEDURE — 25000132 ZZH RX MED GY IP 250 OP 250 PS 637: Performed by: NURSE PRACTITIONER

## 2019-12-24 PROCEDURE — 94799 UNLISTED PULMONARY SVC/PX: CPT

## 2019-12-24 PROCEDURE — 99231 SBSQ HOSP IP/OBS SF/LOW 25: CPT | Performed by: SURGERY

## 2019-12-24 PROCEDURE — 25000128 H RX IP 250 OP 636: Performed by: SURGERY

## 2019-12-24 PROCEDURE — 36415 COLL VENOUS BLD VENIPUNCTURE: CPT | Performed by: NURSE PRACTITIONER

## 2019-12-24 PROCEDURE — 85027 COMPLETE CBC AUTOMATED: CPT | Performed by: NURSE PRACTITIONER

## 2019-12-24 PROCEDURE — 80048 BASIC METABOLIC PNL TOTAL CA: CPT | Performed by: NURSE PRACTITIONER

## 2019-12-24 RX ORDER — UBIDECARENONE 75 MG
1 CAPSULE ORAL EVERY MORNING
COMMUNITY
Start: 2019-10-03

## 2019-12-24 RX ORDER — HYDROCODONE BITARTRATE AND ACETAMINOPHEN 5; 325 MG/1; MG/1
1 TABLET ORAL EVERY 4 HOURS PRN
Qty: 12 TABLET | Refills: 0 | Status: SHIPPED | OUTPATIENT
Start: 2019-12-24 | End: 2020-05-18

## 2019-12-24 RX ADMIN — POLYETHYLENE GLYCOL 3350 17 G: 17 POWDER, FOR SOLUTION ORAL at 09:41

## 2019-12-24 RX ADMIN — CYCLOBENZAPRINE 5 MG: 5 TABLET, FILM COATED ORAL at 11:47

## 2019-12-24 RX ADMIN — ACETAMINOPHEN 650 MG: 325 TABLET, FILM COATED ORAL at 01:02

## 2019-12-24 RX ADMIN — ASPIRIN 81 MG: 81 TABLET, COATED ORAL at 09:39

## 2019-12-24 RX ADMIN — DOCUSATE SODIUM 100 MG: 100 CAPSULE, LIQUID FILLED ORAL at 09:39

## 2019-12-24 RX ADMIN — ACETAMINOPHEN 650 MG: 325 TABLET, FILM COATED ORAL at 09:39

## 2019-12-24 RX ADMIN — KETOROLAC TROMETHAMINE 15 MG: 15 INJECTION, SOLUTION INTRAMUSCULAR; INTRAVENOUS at 03:41

## 2019-12-24 RX ADMIN — AMLODIPINE BESYLATE 5 MG: 5 TABLET ORAL at 09:39

## 2019-12-24 RX ADMIN — METOPROLOL TARTRATE 50 MG: 50 TABLET, FILM COATED ORAL at 09:39

## 2019-12-24 RX ADMIN — KETOROLAC TROMETHAMINE 15 MG: 15 INJECTION, SOLUTION INTRAMUSCULAR; INTRAVENOUS at 09:41

## 2019-12-24 RX ADMIN — CYCLOBENZAPRINE 5 MG: 5 TABLET, FILM COATED ORAL at 05:44

## 2019-12-24 ASSESSMENT — ACTIVITIES OF DAILY LIVING (ADL)
ADLS_ACUITY_SCORE: 15

## 2019-12-24 NOTE — PLAN OF CARE
VSS, afebrile, patient is alert and oriented x 4. Denies pain this shift. Continues with scheduled acetaminophen, flexeril, and Toradol. Lungs sounds clear, bilaterally. FiO2 @ 1LPM nasal cannula with O2 saturation in mid 90s. Removed and SpO2 in 90-96%. Bowel sounds active in all quadrants, had a bowel movement this morning.

## 2019-12-24 NOTE — PLAN OF CARE
"Reason for hospital stay:  Multiple left sided rib fracture  Living situation PTA: Home  Most recent vitals: /52   Pulse 68   Temp 98.1  F (36.7  C) (Tympanic)   Resp 16   Ht 1.499 m (4' 11\")   Wt 62 kg (136 lb 9.6 oz)   SpO2 94%   BMI 27.59 kg/m      Pain Management:  Denies pain throughout the night, scheduled Tylenol, Toradol, and  given throughout the night. Lidocaine patch not in place.   LOC:  A&O x4  Cardiac:  SR 60's with BBB and 1st degree AVB  Respiratory:  Posterior lungs clear, diminished. Non productive cough at times, dyspnea on exertion, no shortness of breath while resting, sats dipped below 90% o2 increased to 1.5 LPM and maintained while asleep. Decreased to 1 LPM once awake. Up to use restroom without o2-- sats at 81 % afterwards.    GI:  Bowel sounds hypoactive in all four quadrants, passing flatus often, c/o constipation-- offered to contact  regarding PRN-- she declined, denies N/V,  :  Voiding adequate throughout the shift  Skin Issues:  Bruising noted to bilateral arms, L hand bruised    IVF:  SL    Nutrition: Regular  ADL's:  Independent  Ambulation: Stand by assist  Safety:  Call light within reach, calls appropriately     Comments:    Face to face report given with opportunity to observe patient.    Report given to Leia Rivas   12/24/2019  7:20 AM    "

## 2019-12-24 NOTE — PLAN OF CARE
Patient discharged at 11:52 AM via wheel chair accompanied by other:Grandson and staff. Prescriptions sent to patients preferred pharmacy. All belongings sent with patient.     Discharge instructions reviewed with Patient. Listed belongings gathered and returned to patient. Yes    Patient discharged to Home.       Core Measures and Vaccines  Core Measures applicable during stay: No. If yes, state diagnosis: N/A  Pneumonia and Influenza given prior to discharge, if indicated: No    Surgical Patient   Surgical Procedures during stay: No  Did patient receive discharge instruction on wound care and recognition of infection symptoms? No    MISC  Follow up appointment made:  No, clinic not open.   Home and hospital aquired medications returned to patient: Yes  Patient reports pain was well managed at discharge: Yes

## 2019-12-24 NOTE — PLAN OF CARE
"Reason for hospital stay:  Multiple rib fractures on left side  Living situation PTA: Comes from home  Most recent vitals: /52   Pulse 68   Temp 99.3  F (37.4  C) (Tympanic)   Resp 20   Ht 1.499 m (4' 11\")   Wt 62 kg (136 lb 9.6 oz)   SpO2 93%   BMI 27.59 kg/m      Pain Management:  Denies. Did keep up on scheduled Flexeril, Tylenol, Toradol.   LOC:  A&O x4, makes some odd comments at times but oriented to person, place, time, situation.   Cardiac:  Regular, SR 70's with BBB and 1st degree AV block  Respiratory:  MOREIRA present, desats during sleep. Low to mid 90's at rest on 1L O2. Diminished in bases. Using IS and flutter valve appropriately.   GI:  BS hypoactive. Constipation present but passing flatus. Soft/nontender. No nausea or vomiting.   :  Voids without difficulty   Skin Issues:  Scattered bruises/abrasions. Lump on right labia.     IVF:  Saline locked  ABX:  None given    Nutrition: Regular diet, tolerates well  Ambulation: SBA with gait belt and walker  Safety:  Free of falls, bed in lowest, call light in reach      Face to face report given with opportunity to observe patient.    Report given to ALYSSA Art   12/23/2019  11:34 PM        "

## 2019-12-24 NOTE — DISCHARGE SUMMARY
[facility]    Discharge Summary  Hospitalist    Date of Admission:  12/21/2019  Date of Discharge:  12/24/2019  Discharging Provider: Osbaldo Eddy MD  Date of Service (when I saw the patient): 12/24/19    Discharge Diagnoses   Principal Problem:    Closed fracture of multiple ribs of left side (12/21/2019)  Active Problems:    Asthma (10/11/2011)    Hypertension (12/21/2019)    Panic disorder (11/4/2014)    Syncope (12/21/2019)    CAD (coronary artery disease) ()    Multiple rib fractures (12/21/2019)      History of Present Illness   Frances Gilliam is an 85 year old female who presented with syncope, trauma.  Please see admission H+P for additional details.    Hospital Course   Frances Gilliam was admitted on 12/21/2019.  85-year-old female with history of SVT in the past, obstructive sleep apnea asthma, as well as hypertension on Norvasc and metoprolol who presents with a syncopal episode and a fall resulting in left-sided closed multiple rib fractures.  Pain was controlled initially with oral medication, however her pain was exacerbated while here, and she did have significant amount of splinting causing hypoxia.  She had a perivertebral block by anesthesia with good relief of her pain.  Her oxygen was able to be weaned down, however she is high 80s low 90s on room air.  This appears to be her baseline as she has experiencing no shortness of breath.  Regarding her syncopal episode, she was monitored on telemetry without any observed malignant arrhythmias.  EKG was negative for acute ischemic changes.  Echocardiogram was obtained showing normal ejection fraction of 55-60% with normal diastolic function.  She does have a history of SVT observed on a Holter monitor approximately 5 years ago.  She had a follow-up cardiology appointment in January 2015 with Dr. Rousseau from [facility], at that time no interventions were planned, with watchful waiting.  It is possible that her syncopal episode was caused by SVT,  although we have no empirical evidence of this.  We will send her home with a Holter monitor again to see if her arrhythmias have increased in frequency.  We will refer her to cardiology again at [facility] in approximately 1 to 2 weeks, hopefully with the results of her Holter monitor and hand.  She currently is feeling fine, pain is controlled with her paravertebral block.  We will also give her some oral narcotics for a few days.  We will discharge her with her incentive spirometer, and she will follow-up with her primary care physician within the week to assess for continued wellbeing.    Osbaldo Eddy MD        Significant Results and Procedures   See below    Pending Results   These results will be followed up by Theodore Lou    Unresulted Labs Ordered in the Past 30 Days of this Admission     No orders found from 11/21/2019 to 12/22/2019.          Code Status   Full Code       Primary Care Physician   Theodore Lou    Physical Exam   Temp: 98.1  F (36.7  C) Temp src: Tympanic BP: 155/52   Heart Rate: 65 Resp: 16 SpO2: 94 % O2 Device: Nasal cannula Oxygen Delivery: 1 LPM  Vitals:    12/21/19 2035   Weight: 62 kg (136 lb 9.6 oz)     Vital Signs with Ranges  Temp:  [97.7  F (36.5  C)-99.3  F (37.4  C)] 98.1  F (36.7  C)  Heart Rate:  [61-71] 65  Resp:  [16-20] 16  BP: (108-155)/(52-61) 155/52  SpO2:  [89 %-95 %] 94 %  I/O last 3 completed shifts:  In: 1680 [P.O.:1680]  Out: 900 [Urine:900]    Constitutional: AA, NAD  Eyes: PERRLA, no injection, no icterus  HEENT: atraumatic, normocephalic  Respiratory: CTA b/l  Cardiovascular: S1 S2 RRR  GI: soft, NT, ND, + bowel sounds  Lymph/Hematologic: no palpable lymphadenopathy  Skin: no rashes, no lesions  Musculoskeletal: No edema, good tone, no deformities  Neurologic: oriented x 3, no focal deficits  Psychiatric: appropriate affect    Discharge Disposition   Discharged to home  Condition at discharge: Stable    Consultations This Hospital Stay   None    Time  Spent on this Encounter   Osbaldo URIOSTEGUI MD, MD, personally saw the patient today and spent greater than 30 minutes discharging this patient.    Discharge Orders      CARDIOLOGY EVAL ADULT REFERRAL      Reason for your hospital stay    Syncope, rib fxs     Follow-up and recommended labs and tests     Follow up with primary care provider, Theodore Lou, within 7 days for hospital follow- up.  No follow up labs or test are needed.     Activity    Your activity upon discharge: activity as tolerated     When to contact your care team    Call your primary doctor if you have any of the following:  increased shortness of breath, dizziness, lightheadedness.     Full Code     Zio Patch Holter 48 Hour Adult Pediatric     Diet    Follow this diet upon discharge: Orders Placed This Encounter      Regular Diet Adult     Discharge Medications   Current Discharge Medication List      START taking these medications    Details   HYDROcodone-acetaminophen (NORCO) 5-325 MG tablet Take 1 tablet by mouth every 4 hours as needed for moderate to severe pain  Qty: 12 tablet, Refills: 0    Associated Diagnoses: Syncope, unspecified syncope type         CONTINUE these medications which have NOT CHANGED    Details   amLODIPine (NORVASC) 5 MG tablet Take 5 mg by mouth daily       aspirin 81 MG EC tablet Take 81 mg by mouth daily      atorvastatin (LIPITOR) 20 MG tablet Take 20 mg by mouth daily       Calcium Carb-Cholecalciferol (CALCIUM PLUS VITAMIN D3 PO) Take 1 tablet by mouth 2 times daily      ferrous sulfate (IRON) 325 (65 FE) MG tablet Take 325 mg by mouth daily (with breakfast)      fluticasone-vilanterol (BREO ELLIPTA) 100-25 MCG/INH oral inhaler Inhale 1 puff into the lungs daily      metoprolol tartrate (LOPRESSOR) 50 MG tablet Take 50 mg by mouth 2 times daily       Multiple Vitamins-Minerals (ICAPS PO) Take 1 capsule by mouth daily      terazosin (HYTRIN) 10 MG capsule Take 10 mg by mouth daily       cyanocobalamin (VITAMIN  B-12) 100 MCG tablet Take 1 tablet by mouth daily as directed.      Nitroglycerin (NITROTAB SL) Place 0.4 mg under the tongue every 5 minutes as needed for chest pain           Allergies   Allergies   Allergen Reactions     Niacin Rash     Data   Most Recent 3 CBC's:  Recent Labs   Lab Test 12/24/19  0511 12/23/19  0533 12/22/19  0513   WBC 7.3 6.1 6.8   HGB 12.8 12.8 12.5   MCV 93 94 95   * 132* 138*      Most Recent 3 BMP's:  Recent Labs   Lab Test 12/24/19  0511 12/23/19  0533 12/22/19  0513    139 140   POTASSIUM 4.3 3.6 3.6   CHLORIDE 105 105 107   CO2 28 27 26   BUN 19 15 16   CR 0.66 0.64 0.64   ANIONGAP 5 7 7   CATARINA 8.7 8.3* 8.1*   * 94 88     Most Recent 2 LFT's:  Recent Labs   Lab Test 12/23/19  0533 12/21/19  1313   AST 17 24   ALT 23 34   ALKPHOS 74 102   BILITOTAL 0.8 0.7     Most Recent INR's and Anticoagulation Dosing History:  Anticoagulation Dose History     There is no flowsheet data to display.        Most Recent 3 Troponin's:  Recent Labs   Lab Test 12/21/19  1801 12/21/19  1313   TROPI <0.015 <0.015     Most Recent Cholesterol Panel:  Recent Labs   Lab Test 06/02/14  1239   LDL 58   HDL 54   TRIG 139     Most Recent 6 Bacteria Isolates From Any Culture (See EPIC Reports for Culture Details):  Recent Labs   Lab Test 12/21/19  1700   CULT No MRSA isolated     Most Recent TSH, T4 and A1c Labs:  Recent Labs   Lab Test 11/06/14  1301   T4 1.40     Results for orders placed or performed during the hospital encounter of 12/21/19   CT Head w/o Contrast    Narrative    PROCEDURE: CT HEAD W/O CONTRAST     HISTORY: TIA, initial exam.    COMPARISON: None.    TECHNIQUE:  Helical images of the head from the foramen magnum to the  vertex were obtained without contrast.    FINDINGS: The ventricles and sulci are prominent, compatible with  mild, generalized volume loss. No acute intracranial hemorrhage, mass  effect, midline shift, hydrocephalus or basilar cystern effacement  are  present.    The grey-white matter interface is preserved. Patchy hypoattenuation  within the supratentorial subcortical and periventricular white matter  is most compatible with microvascular ischemic change.     The calvarium is intact.  A slightly irregular filling defect is  present in the left maxillary sinus.      Impression    IMPRESSION: No acute intracranial hemorrhage or CT evidence of  transcortical ischemia.    Heterogeneous left maxillary sinus filling defect. Recommend follow-up  CT sinus with contrast , ideally in a few weeks.    VÍCTOR AMIN MD   Ribs XR, unilat 3 views + PA chest,  left    Narrative    PROCEDURE:  XR RIBS & CHEST LT 3VW    HISTORY: Left-sided rib pain post fall, .    COMPARISON:  11/15/2016    FINDINGS:  The cardiomediastinal contours are stable.  The trachea is midline.   There is calcific aortic atherosclerosis.  Interstitial thickening is chronic. Costochondral calcifications are  present. No focal consolidation, effusion or pneumothorax.    There are acute fractures of the left fourth, fifth and sixth ribs  posteriorly.      Impression    IMPRESSION:      Acute fractures of the left fourth, fifth and sixth ribs posteriorly.    VÍCTOR AMIN MD   CT Chest (PE) Abdomen Pelvis w Contrast    Narrative    PROCEDURE:  CT CHEST PE ABDOMEN PELVIS W CONTRAST.    HISTORY:  Evaluate for pulmonary embolism.  Chest-abd-pelvis trauma,  moderate, blunt; Chest pain post trauma with elevated d-dimer.    TECHNIQUE:  Initial pre-contrast  and localizer images were  obtained.  Contrast enhanced helical CT pulmonary angiography was then  performed.  Helical CT images of the abdomen and pelvis were  performed. Routine transaxial and post-processed (multiplanar and/or  MIP) reformations were obtained.    COMPARISON:  11/15/2016    MEDS/CONTRAST: OMNIPAQUE 350 100 mL    PULMONARY CTA FINDINGS:  This is a diagnostic quality helical CT  pulmonary angiogram.  There is no acute  pulmonary embolism to the  first subsegmental pulmonary artery level. The main pulmonary artery  is dilated.    OTHER FINDINGS:      There is a low-density right thyroid nodule measuring 4 cm resulting  in slight right to left midline shift of trachea. The heart is  enlarged. There is a moderate hiatal hernia.    Trace bilateral effusions are present. No pneumothorax is present. The  central airways are unremarkable. No focal consolidation or  intrapulmonary mass is identified.    Acute fractures of the left posterior fourth, fifth, sixth and seventh  ribs are seen.    Liver, gallbladder, pancreas, spleen and adrenal glands demonstrate no  evidence of trauma or other acute abnormality. The infrarenal aorta is  dilated up to 3.3 cm. Symmetric nephrograms are present without  hydronephrosis.      Impression    IMPRESSION:    No acute pulmonary emboli to the subsegmental level.    Cardiomegaly. Pulmonary hypertension.    Acute posterior left fourth, fifth, sixth and seventh rib fractures.  No pneumothorax.    No evidence of intra-abdominal organ blunt injury.    VÍCTOR AMIN MD   Echocardiogram Complete    Narrative    [id number]  [id number]  [id number]  [id number]^RAVI^FAITH^R           [facility]  Echocardiography Laboratory  [address] [place] [address]     Name: BHASKAR SILVESTRE  MRN: [id number]  : 1934  Study Date: 2019 11:54 AM  Age: 85 yrs  Gender: Female  Patient Location: [facility]  Reason For Study: Syncope  History: syncope  Ordering Physician: FAITH RODRIGUES  Referring Physician: FAITH RODRIGUES  Performed By: Aimee Barriga     BSA: 1.6 m2  Height: 59 in  Weight: 136 lb  _____________________________________________________________________________  __     _____________________________________________________________________________  __        Interpretation Summary  No pericardial effusion is present.  Global and regional left ventricular function is  normal with an EF of 55-60%.  Left ventricular diastolic function is normal.  The right ventricle is normal size.  Both atria appear normal.  Trace mitral insufficiency is present.  Mild aortic valve calcification is present.  Trace aortic insufficiency is present.  Trace tricuspid insufficiency is present.  Right ventricular systolic pressure is 28mmHg above the right atrial pressure.  Trace pulmonic insufficiency is present.  The aorta root is normal.  _____________________________________________________________________________  __        Left Ventricle  Global and regional left ventricular function is normal with an EF of 55-60%.  Left ventricular diastolic function is normal.     Right Ventricle  The right ventricle is normal size.     Atria  Both atria appear normal.     Mitral Valve  Trace mitral insufficiency is present.     Aortic Valve  Mild aortic valve calcification is present. Trace aortic insufficiency is  present. The mean AoV pressure gradient is 4.2 mmHg.        Tricuspid Valve  Trace tricuspid insufficiency is present. Right ventricular systolic pressure  is 28mmHg above the right atrial pressure.     Pulmonic Valve  Trace pulmonic insufficiency is present.     Vessels  The aorta root is normal.     Pericardium  No pericardial effusion is present.     _____________________________________________________________________________  __  MMode/2D Measurements & Calculations  IVSd: 1.1 cm  IVSs: 1.4 cm  LVIDd: 5.0 cm  LVIDs: 3.6 cm  LVPWd: 1.2 cm  LVPWs: 1.7 cm  FS: 29.0 %  LV mass(C)d: 220.3 grams  LV mass(C)dI: 140.8 grams/m2  LV mass(C)sI: 129.5 grams/m2  Ao root diam: 3.1 cm  LA dimension: 3.8 cm  LA/Ao: 1.2  LVOT diam: 1.6 cm  LVOT area: 2.1 cm2     RWT: 0.49     Time Measurements  Pulm. HR: 157.7 BPM     Doppler Measurements & Calculations  MV E max tere: 101.0 cm/sec  MV A max tere: 110.8 cm/sec  MV E/A: 0.91  MV dec slope: 431.5 cm/sec2  MV dec time: 0.23 sec  Ao V2 max: 133.8 cm/sec  Ao max PG:  7.2 mmHg  Ao V2 mean: 96.0 cm/sec  Ao mean P.2 mmHg  Ao V2 VTI: 36.9 cm  AMANDO(I,D): 1.5 cm2  AMANDO(V,D): 1.4 cm2  LV V1 max PG: 3.5 mmHg  LV V1 max: 93.3 cm/sec  LV V1 VTI: 27.0 cm  CO(LVOT): 8.7 l/min  CI(LVOT): 5.6 l/min/m2  SV(LVOT): 55.8 ml  SI(LVOT): 35.6 ml/m2  TV max P.8 mmHg  PA V2 max: 94.3 cm/sec  PA max PG: 3.6 mmHg  PA mean P.9 mmHg  PA V2 VTI: 25.1 cm  TR max true: 263.8 cm/sec  TR max P.8 mmHg  AV True Ratio (DI): 0.70  AMANDO Index (cm2/m2): 0.97        _____________________________________________________________________________  __           Report approved by: Ian Wu 2019 01:23 PM

## 2019-12-24 NOTE — PROGRESS NOTES
Name: Frances Gilliam    MRN#: [id number]    Reason for Hospitalization: Hypoxia [R09.02]  Closed fracture of multiple ribs of left side, initial encounter [S22.42XA]    LOW: medium risk    Discharge Date: 12/24/2019    Patient / Family response to discharge plan: agreeable    Follow-Up Appt: No future appointments.    Other Providers (Care Coordinator, County Services, PCA services etc): No    Discharge Disposition: home via family    Laurie LUIS Glass

## 2019-12-27 ENCOUNTER — HOSPITAL ENCOUNTER (OUTPATIENT)
Dept: CARDIOLOGY | Facility: HOSPITAL | Age: 84
Discharge: HOME | End: 2019-12-27
Attending: INTERNAL MEDICINE | Admitting: INTERNAL MEDICINE
Payer: MEDICARE

## 2019-12-27 DIAGNOSIS — R55 SYNCOPE, UNSPECIFIED SYNCOPE TYPE: ICD-10-CM

## 2019-12-27 PROCEDURE — 0296T ZIO PATCH HOLTER ADULT PEDIATRIC GREATER THAN 48 HRS: CPT | Mod: TC

## 2019-12-27 PROCEDURE — 0298T ZIO PATCH HOLTER ADULT PEDIATRIC GREATER THAN 48 HRS: CPT | Performed by: INTERNAL MEDICINE

## 2020-01-15 ENCOUNTER — TELEPHONE (OUTPATIENT)
Dept: CARDIOLOGY | Facility: OTHER | Age: 85
End: 2020-01-15

## 2020-01-15 NOTE — TELEPHONE ENCOUNTER
Mikey Ward, DO  You 25 minutes ago (4:58 PM)      Perfect, thanks!     Dr. Ward    Routing comment

## 2020-01-15 NOTE — TELEPHONE ENCOUNTER
Call placed to [facility] and Dr. Ward spoke directly to Dr. Lou regarding patient's Zio patch results.  Zio patch results faxed per [facility] to [facility] fax: [phone].      Call placed back to [facility] [phone] and this writer informed Dr. Lou's float nurse that the fax was sent.  Also informed her that per Dr. Ward:  the patient should be contacted today by Dr. Lou, patient's primary provider,  to address these Zio patch results.  Gave float nurse [facility]'s direct phone number for Dr. Lou to return a call to Dr. Ward with any further questions or concerns.

## 2020-01-15 NOTE — TELEPHONE ENCOUNTER
[facility] Krista, nurse, with Dr. Lou.  Krista states that Dr. Lou did receive the Zio patch result fax and that Krista called the patient to notify patient of Zio Patch results and that Dr. Lou put in a referral for Dr. Giraldo.

## 2020-01-20 NOTE — TELEPHONE ENCOUNTER
Mikey Ward, DO  You 54 minutes ago (12:49 PM)      Sure, should be fine to include the summary.     Dr. Ward    Routing comment       You  Kane, Madyson HOWE RN; Mikey Ward, DO 2 hours ago (11:26 AM)      Hebert, the Zio patch results from the red folder were faxed by [facility] per Dr. Ward's request to Dr. Lou on 1/15/2020 .  Does Dr. Ward also want his note with the conclusion note also sent to Dr. Lou?  If so, please have [facility] send to Dr. Lou at either the [facility] or [place] site, which ever Dr. Lou is working at.     Routing comment

## 2020-01-21 NOTE — TELEPHONE ENCOUNTER
Faxed Conclusion to Dr. Lou at [facility]    Tel: [phone]  Fax: [phone]

## 2020-05-18 ENCOUNTER — APPOINTMENT (OUTPATIENT)
Dept: GENERAL RADIOLOGY | Facility: HOSPITAL | Age: 85
End: 2020-05-18
Attending: EMERGENCY MEDICINE
Payer: MEDICARE

## 2020-05-18 ENCOUNTER — HOSPITAL ENCOUNTER (EMERGENCY)
Facility: HOSPITAL | Age: 85
Discharge: HOME | End: 2020-05-18
Attending: EMERGENCY MEDICINE | Admitting: EMERGENCY MEDICINE
Payer: MEDICARE

## 2020-05-18 VITALS
SYSTOLIC BLOOD PRESSURE: 152 MMHG | RESPIRATION RATE: 22 BRPM | OXYGEN SATURATION: 92 % | TEMPERATURE: 98.3 F | DIASTOLIC BLOOD PRESSURE: 111 MMHG

## 2020-05-18 DIAGNOSIS — K59.00 CONSTIPATION, UNSPECIFIED CONSTIPATION TYPE: ICD-10-CM

## 2020-05-18 DIAGNOSIS — K56.41 FECAL IMPACTION (H): ICD-10-CM

## 2020-05-18 LAB
ALBUMIN SERPL-MCNC: 3.9 G/DL (ref 3.4–5)
ALBUMIN UR-MCNC: 10 MG/DL
ALP SERPL-CCNC: 115 U/L (ref 40–150)
ALT SERPL W P-5'-P-CCNC: 28 U/L (ref 0–50)
ANION GAP SERPL CALCULATED.3IONS-SCNC: 9 MMOL/L (ref 3–14)
APPEARANCE UR: CLEAR
AST SERPL W P-5'-P-CCNC: 22 U/L (ref 0–45)
BACTERIA #/AREA URNS HPF: ABNORMAL /HPF
BASOPHILS # BLD AUTO: 0 10E9/L (ref 0–0.2)
BASOPHILS NFR BLD AUTO: 0.2 %
BILIRUB SERPL-MCNC: 0.7 MG/DL (ref 0.2–1.3)
BILIRUB UR QL STRIP: NEGATIVE
BUN SERPL-MCNC: 18 MG/DL (ref 7–30)
CALCIUM SERPL-MCNC: 10.3 MG/DL (ref 8.5–10.1)
CHLORIDE SERPL-SCNC: 103 MMOL/L (ref 94–109)
CO2 SERPL-SCNC: 25 MMOL/L (ref 20–32)
COLOR UR AUTO: YELLOW
CREAT SERPL-MCNC: 0.72 MG/DL (ref 0.52–1.04)
DIFFERENTIAL METHOD BLD: ABNORMAL
EOSINOPHIL # BLD AUTO: 0 10E9/L (ref 0–0.7)
EOSINOPHIL NFR BLD AUTO: 0.1 %
ERYTHROCYTE [DISTWIDTH] IN BLOOD BY AUTOMATED COUNT: 13 % (ref 10–15)
GFR SERPL CREATININE-BSD FRML MDRD: 76 ML/MIN/{1.73_M2}
GLUCOSE SERPL-MCNC: 144 MG/DL (ref 70–99)
GLUCOSE UR STRIP-MCNC: NEGATIVE MG/DL
HCT VFR BLD AUTO: 42.5 % (ref 35–47)
HGB BLD-MCNC: 14.3 G/DL (ref 11.7–15.7)
HGB UR QL STRIP: NEGATIVE
IMM GRANULOCYTES # BLD: 0.1 10E9/L (ref 0–0.4)
IMM GRANULOCYTES NFR BLD: 0.5 %
KETONES UR STRIP-MCNC: NEGATIVE MG/DL
LEUKOCYTE ESTERASE UR QL STRIP: NEGATIVE
LYMPHOCYTES # BLD AUTO: 0.6 10E9/L (ref 0.8–5.3)
LYMPHOCYTES NFR BLD AUTO: 3.6 %
MCH RBC QN AUTO: 31.4 PG (ref 26.5–33)
MCHC RBC AUTO-ENTMCNC: 33.6 G/DL (ref 31.5–36.5)
MCV RBC AUTO: 93 FL (ref 78–100)
MONOCYTES # BLD AUTO: 0.7 10E9/L (ref 0–1.3)
MONOCYTES NFR BLD AUTO: 4.3 %
NEUTROPHILS # BLD AUTO: 15.3 10E9/L (ref 1.6–8.3)
NEUTROPHILS NFR BLD AUTO: 91.3 %
NITRATE UR QL: NEGATIVE
NRBC # BLD AUTO: 0 10*3/UL
NRBC BLD AUTO-RTO: 0 /100
PH UR STRIP: 6.5 PH (ref 4.7–8)
PLATELET # BLD AUTO: 231 10E9/L (ref 150–450)
POTASSIUM SERPL-SCNC: 4 MMOL/L (ref 3.4–5.3)
PROT SERPL-MCNC: 7.4 G/DL (ref 6.8–8.8)
RBC # BLD AUTO: 4.56 10E12/L (ref 3.8–5.2)
RBC #/AREA URNS AUTO: <1 /HPF (ref 0–2)
SODIUM SERPL-SCNC: 137 MMOL/L (ref 133–144)
SOURCE: ABNORMAL
SP GR UR STRIP: 1.02 (ref 1–1.03)
UROBILINOGEN UR STRIP-MCNC: NORMAL MG/DL (ref 0–2)
WBC # BLD AUTO: 16.8 10E9/L (ref 4–11)
WBC #/AREA URNS AUTO: <1 /HPF (ref 0–5)

## 2020-05-18 PROCEDURE — 25000132 ZZH RX MED GY IP 250 OP 250 PS 637: Mod: GY | Performed by: EMERGENCY MEDICINE

## 2020-05-18 PROCEDURE — 80053 COMPREHEN METABOLIC PANEL: CPT | Performed by: PHYSICIAN ASSISTANT

## 2020-05-18 PROCEDURE — 99284 EMERGENCY DEPT VISIT MOD MDM: CPT | Mod: Z6 | Performed by: EMERGENCY MEDICINE

## 2020-05-18 PROCEDURE — 85025 COMPLETE CBC W/AUTO DIFF WBC: CPT | Performed by: PHYSICIAN ASSISTANT

## 2020-05-18 PROCEDURE — 99284 EMERGENCY DEPT VISIT MOD MDM: CPT

## 2020-05-18 PROCEDURE — 36415 COLL VENOUS BLD VENIPUNCTURE: CPT | Performed by: PHYSICIAN ASSISTANT

## 2020-05-18 PROCEDURE — 81001 URINALYSIS AUTO W/SCOPE: CPT | Performed by: PHYSICIAN ASSISTANT

## 2020-05-18 PROCEDURE — 74018 RADEX ABDOMEN 1 VIEW: CPT | Mod: TC

## 2020-05-18 RX ORDER — POLYETHYLENE GLYCOL 3350 17 G/17G
1 POWDER, FOR SOLUTION ORAL 2 TIMES DAILY
Qty: 527 G | Refills: 0 | Status: SHIPPED | OUTPATIENT
Start: 2020-05-18 | End: 2020-06-17

## 2020-05-18 RX ORDER — LACTULOSE 10 G/15ML
20 SOLUTION ORAL ONCE
Status: COMPLETED | OUTPATIENT
Start: 2020-05-18 | End: 2020-05-18

## 2020-05-18 RX ORDER — POLYETHYLENE GLYCOL 3350 17 G/17G
17 POWDER, FOR SOLUTION ORAL DAILY
Status: DISCONTINUED | OUTPATIENT
Start: 2020-05-18 | End: 2020-05-18 | Stop reason: HOSPADM

## 2020-05-18 RX ADMIN — POLYETHYLENE GLYCOL 3350 17 G: 17 POWDER, FOR SOLUTION ORAL at 15:56

## 2020-05-18 RX ADMIN — SODIUM PHOSPHATE, DIBASIC AND SODIUM PHOSPHATE, MONOBASIC 1 ENEMA: 7; 19 ENEMA RECTAL at 17:08

## 2020-05-18 RX ADMIN — LACTULOSE 20 G: 20 SOLUTION ORAL at 15:55

## 2020-05-18 ASSESSMENT — ENCOUNTER SYMPTOMS
SINUS PRESSURE: 0
NUMBNESS: 0
NECK PAIN: 0
BRUISES/BLEEDS EASILY: 0
ADENOPATHY: 0
POLYPHAGIA: 0
CHEST TIGHTNESS: 0
DYSURIA: 0
NAUSEA: 0
PALPITATIONS: 0
FEVER: 0
LIGHT-HEADEDNESS: 0
CHOKING: 0
ABDOMINAL DISTENTION: 0
ACTIVITY CHANGE: 0
AGITATION: 0
VOMITING: 0
FACIAL SWELLING: 0

## 2020-05-18 NOTE — ED NOTES
"\"Had a small round brown soft stool.\"  Flushed the toilet, unable to see the amount.  Re instructed patient again to not flush the stool if she has another stool.      "

## 2020-05-18 NOTE — ED NOTES
Patient given verbal and written discharge instructions. Patient verbalized understanding of discharge instructions. Rx sent to [facility] pharmacy.

## 2020-05-18 NOTE — ED NOTES
"\"Took 2 pills of docusate sodium/sennosides last night for the first time for constipation.  Did not take any today.  Having constipation.\"   "

## 2020-05-18 NOTE — ED TRIAGE NOTES
Pt stated last night she started to have rectal pain and inability to pass stool. Denies any nausea or vomiting.

## 2020-05-18 NOTE — ED AVS SNAPSHOT
[facility]  [address]  [place] [address]  Phone:  [phone]                                    Frances Gilliam   MRN: [id number]    Department:  [facility]   Date of Visit:  5/18/2020           After Visit Summary Signature Page    I have received my discharge instructions, and my questions have been answered. I have discussed any challenges I see with this plan with the nurse or doctor.    ..........................................................................................................................................  Patient/Patient Representative Signature      ..........................................................................................................................................  Patient Representative Print Name and Relationship to Patient    ..................................................               ................................................  Date                                   Time    ..........................................................................................................................................  Reviewed by Signature/Title    ...................................................              ..............................................  Date                                               Time          22EPIC Rev 08/18

## 2020-05-18 NOTE — ED PROVIDER NOTES
History     Chief Complaint   Patient presents with     Rectal/perineal Pain     Constipation     HPI  Frances Gilliam is a 86 year old female who  Has not had a bowel movement since  Saturday   She is not on any chronic narcotics and does take amlodipine but no verapamil or diltiazem.  She also is on iron for anemia.  There has not been a regular history of her having constipation.  She has had urinary tract infections in the past.  She saw her doctor today who did an x-ray as well as saw her and told her that she had a large amount of retained stool in the distal rectal vault.  She is not having nausea vomiting chest pain arm pain jaw pain or any other symptoms though she does have a history of hemorrhoids. She has not had black stools nor bleeding from the recturm  Her water intake has not changed.     Allergies:  Allergies   Allergen Reactions     Niacin Rash       Problem List:    Patient Active Problem List    Diagnosis Date Noted     Coronary atherosclerosis 12/21/2019     Priority: Medium     Disorder of bone and cartilage 12/21/2019     Priority: Medium     Hyperlipidemia 12/21/2019     Priority: Medium     Hypertension 12/21/2019     Priority: Medium     Macular degeneration 12/21/2019     Priority: Medium     Occlusion and stenosis of carotid artery without mention of cerebral infarction 12/21/2019     Priority: Medium     Overview:   with 50-75% stenoses of left, right internal carotid arteries, documented by   US 5/15/02 and 06/16/04       Closed fracture of multiple ribs of left side 12/21/2019     Priority: Medium     Syncope 12/21/2019     Priority: Medium     Multiple rib fractures 12/21/2019     Priority: Medium     Vitamin D deficiency      Priority: Medium     Vitamin B 12 deficiency      Priority: Medium     Peripheral vascular disease (H)      Priority: Medium     Iron deficiency anemia      Priority: Medium     History of MI (myocardial infarction)      Priority: Medium     GI hemorrhage       Priority: Medium     Essential thrombocytosis (H)      Priority: Medium     CAD (coronary artery disease)      Priority: Medium     Aortic valve sclerosis      Priority: Medium     Anemia 2016     Priority: Medium     Anxiety about health 2014     Priority: Medium     Panic disorder 2014     Priority: Medium     Asthma 10/11/2011     Priority: Medium     Lumbago 2006     Priority: Medium     IMO Update 10/11       Sacroiliitis, not elsewhere classified (H) 2006     Priority: Medium     IMO Update 10/11          Past Medical History:    Past Medical History:   Diagnosis Date     Age-related macular degeneration      Aortic valve sclerosis      CAD (coronary artery disease)      Essential thrombocytosis (H)      GI hemorrhage      History of MI (myocardial infarction)      Hypertension      Iron deficiency anemia      Mixed hyperlipidemia      Peripheral vascular disease (H)      Vitamin B 12 deficiency      Vitamin D deficiency        Past Surgical History:    Past Surgical History:   Procedure Laterality Date     ESOPHAGOSCOPY, GASTROSCOPY, DUODENOSCOPY (EGD), COMBINED N/A 2017    Procedure: COMBINED ESOPHAGOSCOPY, GASTROSCOPY, DUODENOSCOPY (EGD);  UPPER ENDOSCOPY WITH BIOPSIES, ENTEROSCOPY WITH  SMALL BOWEL ARGON PLASMA COAGULATION;  Surgeon: Robin Vaughn MD;  Location: [facility]     [place] PATIENT HAS A CORONARY ARTERY STENT         Family History:    Family History   Problem Relation Age of Onset     Heart Disease Father        Social History:  Marital Status:   [5]  Social History     Tobacco Use     Smoking status: Former Smoker     Types: Cigarettes     Last attempt to quit: 1968     Years since quittin.4   Substance Use Topics     Alcohol use: Not on file     Drug use: Not on file        Medications:    amLODIPine (NORVASC) 5 MG tablet  aspirin 81 MG EC tablet  atorvastatin (LIPITOR) 20 MG tablet  Calcium Carb-Cholecalciferol (CALCIUM PLUS VITAMIN D3  PO)  cyanocobalamin (VITAMIN B-12) 100 MCG tablet  ferrous sulfate (IRON) 325 (65 FE) MG tablet  fluticasone-vilanterol (BREO ELLIPTA) 100-25 MCG/INH oral inhaler  metoprolol tartrate (LOPRESSOR) 50 MG tablet  Multiple Vitamins-Minerals (ICAPS PO)  terazosin (HYTRIN) 10 MG capsule  Nitroglycerin (NITROTAB SL)          Review of Systems   Constitutional: Negative for activity change and fever.   HENT: Negative for facial swelling and sinus pressure.    Respiratory: Negative for choking and chest tightness.    Cardiovascular: Negative for chest pain, palpitations and leg swelling.   Gastrointestinal: Negative for abdominal distention, nausea and vomiting.   Endocrine: Negative for polyphagia and polyuria.   Genitourinary: Negative for dysuria, pelvic pain and urgency.   Musculoskeletal: Negative for gait problem and neck pain.   Allergic/Immunologic: Negative for immunocompromised state.   Neurological: Negative for light-headedness and numbness.   Hematological: Negative for adenopathy. Does not bruise/bleed easily.   Psychiatric/Behavioral: Negative for agitation and behavioral problems.       Physical Exam   BP: 120/60  Heart Rate: 99  Temp: 97.5  F (36.4  C)  Resp: 20  SpO2: (!) 91 %      Physical Exam  Constitutional:       General: She is not in acute distress.     Appearance: Normal appearance. She is well-developed. She is not ill-appearing or diaphoretic.   HENT:      Head: Normocephalic and atraumatic.      Right Ear: Tympanic membrane normal.      Left Ear: Tympanic membrane normal.      Mouth/Throat:      Mouth: Mucous membranes are moist.      Pharynx: No oropharyngeal exudate or posterior oropharyngeal erythema.   Eyes:      General: No scleral icterus.     Extraocular Movements: Extraocular movements intact.      Conjunctiva/sclera: Conjunctivae normal.      Pupils: Pupils are equal, round, and reactive to light.   Neck:      Musculoskeletal: Normal range of motion and neck supple. No neck rigidity.  "  Cardiovascular:      Rate and Rhythm: Normal rate.   Pulmonary:      Effort: Pulmonary effort is normal.      Breath sounds: Normal breath sounds.   Abdominal:      General: Abdomen is flat. There is no distension.      Palpations: There is no mass.      Tenderness: There is no abdominal tenderness. There is no right CVA tenderness or left CVA tenderness.      Hernia: No hernia is present.      Comments: Moderate soft but impacted stool in recturm. External hermorrhoids noted.    Musculoskeletal:         General: No swelling, tenderness, deformity or signs of injury.      Right lower leg: No edema.      Left lower leg: No edema.   Lymphadenopathy:      Cervical: No cervical adenopathy.   Skin:     General: Skin is warm and dry.      Capillary Refill: Capillary refill takes less than 2 seconds.      Coloration: Skin is not pale.      Findings: No erythema or rash.   Neurological:      Mental Status: She is alert and oriented to person, place, and time.      Cranial Nerves: No cranial nerve deficit.      Sensory: No sensory deficit.   Psychiatric:         Mood and Affect: Mood normal.       Rectum shows moderate soiling of stool about the buttocks, no bleeding.   ED Course        Procedures        I gently disimpacted pt and \" diced up \" the stool with pt in the LL decub position. She tolerated well    moderate        Results for orders placed or performed during the hospital encounter of 05/18/20 (from the past 24 hour(s))   CBC with platelets differential   Result Value Ref Range    WBC 16.8 (H) 4.0 - 11.0 10e9/L    RBC Count 4.56 3.8 - 5.2 10e12/L    Hemoglobin 14.3 11.7 - 15.7 g/dL    Hematocrit 42.5 35.0 - 47.0 %    MCV 93 78 - 100 fl    MCH 31.4 26.5 - 33.0 pg    MCHC 33.6 31.5 - 36.5 g/dL    RDW 13.0 10.0 - 15.0 %    Platelet Count 231 150 - 450 10e9/L    Diff Method Automated Method     % Neutrophils 91.3 %    % Lymphocytes 3.6 %    % Monocytes 4.3 %    % Eosinophils 0.1 %    % Basophils 0.2 %    % Immature " Granulocytes 0.5 %    Nucleated RBCs 0 0 /100    Absolute Neutrophil 15.3 (H) 1.6 - 8.3 10e9/L    Absolute Lymphocytes 0.6 (L) 0.8 - 5.3 10e9/L    Absolute Monocytes 0.7 0.0 - 1.3 10e9/L    Absolute Eosinophils 0.0 0.0 - 0.7 10e9/L    Absolute Basophils 0.0 0.0 - 0.2 10e9/L    Abs Immature Granulocytes 0.1 0 - 0.4 10e9/L    Absolute Nucleated RBC 0.0    Comprehensive metabolic panel   Result Value Ref Range    Sodium 137 133 - 144 mmol/L    Potassium 4.0 3.4 - 5.3 mmol/L    Chloride 103 94 - 109 mmol/L    Carbon Dioxide 25 20 - 32 mmol/L    Anion Gap 9 3 - 14 mmol/L    Glucose 144 (H) 70 - 99 mg/dL    Urea Nitrogen 18 7 - 30 mg/dL    Creatinine 0.72 0.52 - 1.04 mg/dL    GFR Estimate 76 >60 mL/min/[1.73_m2]    GFR Estimate If Black 88 >60 mL/min/[1.73_m2]    Calcium 10.3 (H) 8.5 - 10.1 mg/dL    Bilirubin Total 0.7 0.2 - 1.3 mg/dL    Albumin 3.9 3.4 - 5.0 g/dL    Protein Total 7.4 6.8 - 8.8 g/dL    Alkaline Phosphatase 115 40 - 150 U/L    ALT 28 0 - 50 U/L    AST 22 0 - 45 U/L   UA reflex to Microscopic and Culture    Specimen: Catheterized Urine   Result Value Ref Range    Color Urine Yellow     Appearance Urine Clear     Glucose Urine Negative NEG^Negative mg/dL    Bilirubin Urine Negative NEG^Negative    Ketones Urine Negative NEG^Negative mg/dL    Specific Gravity Urine 1.016 1.003 - 1.035    Blood Urine Negative NEG^Negative    pH Urine 6.5 4.7 - 8.0 pH    Protein Albumin Urine 10 (A) NEG^Negative mg/dL    Urobilinogen mg/dL Normal 0.0 - 2.0 mg/dL    Nitrite Urine Negative NEG^Negative    Leukocyte Esterase Urine Negative NEG^Negative    Source Catheterized Urine     RBC Urine <1 0 - 2 /HPF    WBC Urine <1 0 - 5 /HPF    Bacteria Urine None (A) NEG^Negative /HPF   XR Abdomen Port 1 View    Narrative    PROCEDURE:  XR ABDOMEN PORT 1 VW    HISTORY:  no bm, constipated. mod fecal impaction.    TECHNIQUE:  AP and supine radiographs of the abdomen.    COMPARISON:  12/21/2019    FINDINGS:     The lung bases are clear.  No subdiaphragmatic air is seen.    No dilated loops of small bowel or air-fluid levels are seen. Moderate  scattered colonic stool is present. There is mild rectal impaction.      Impression    IMPRESSION:    No obstruction or free air. Constipation.    VÍCTOR AMIN MD       Medications   polyethylene glycol (MIRALAX) Packet 17 g (17 g Oral Given 5/18/20 9046)   lactulose (CHRONULAC) solution 20 g (20 g Oral Given 5/18/20 8355)   sodium phosphate (FLEET ENEMA) 1 enema (1 enema Rectal Given 5/18/20 1705)       Assessments & Plan (with Medical Decision Making)     I have reviewed the nursing notes.    I have reviewed the findings, diagnosis, plan and need for follow up with the patient.      New Prescriptions    No medications on file       Final diagnoses:   None   after disimpaction and lactulose and miralax, the pt had a moderate amount of stool out. Tolerated well. abd is benign. Will place on five days lactulose and also daily bid miralax and followup with her PCP.       Pt had low sat. unevennailbed with elevated BP noted . .Missed her Breo. Does not feel unusually Short of breath.. no nailbed cyanosis,   I moved the pulse ox to her Right middle finger and the sat bounced up to 91 percent. Her chest is clear. No Murmur or S 3 noted.    She feels good to go. She may need to consider alternate HTN med than Ca blocker tho amlodipine is less likely to cause constipation than verapamil and diltiazem  1. Fecal impaction (H)    2. Constipation, unspecified constipation type          5/18/2020   [facility]     Pema Moser MD  05/18/20 1572

## 2020-05-18 NOTE — DISCHARGE INSTRUCTIONS
Drink plenty of fluids and try to take in 24 gm fiber daily. More fluid intake is essential. Three days of lactulose and then miralax.. Followup with your PCP

## 2021-01-05 NOTE — PROGRESS NOTES
S: HD #4 Syncope resulting in a fall with multiple left sided rib fractures.  Needed rib blocks yesterday morning with good results.  This morning she states she has no pain.  She's aggressively working on her incentive spirometer.  Her oxygen requirement has gone down to 1 L.      O:  Temp: 98.1  F (36.7  C) Temp  Min: 97.7  F (36.5  C)  Max: 99.3  F (37.4  C)  Resp: 16 Resp  Min: 16  Max: 20  SpO2: 94 % SpO2  Min: 89 %  Max: 94 %  Heart Rate: 65 Heart Rate  Min: 62  Max: 71  BP: 155/52 Systolic (24hrs), Av , Min:122 , Max:155   Diastolic (24hrs), Av, Min:52, Max:61  Gen: AAOx4, NAD, sitting up in chair, looks comfortably  Lungs: CTA B/L good inspiratory effort    Results for orders placed or performed during the hospital encounter of 19 (from the past 24 hour(s))   Echocardiogram Complete    Narrative    [id number]  [id number]  [id number]  [id number]^RAVI^FAITH^R           [facility]  Echocardiography Laboratory  [address] [place] [id number]     Name: BHASKAR SILVESTRE  MRN: [id number]  : 1934  Study Date: 2019 11:54 AM  Age: 85 yrs  Gender: Female  Patient Location: [facility]  Reason For Study: Syncope  History: syncope  Ordering Physician: FAITH RODRIGUES  Referring Physician: FAITH RODRIGUES  Performed By: Aimee Barriga     BSA: 1.6 m2  Height: 59 in  Weight: 136 lb  _____________________________________________________________________________  __     _____________________________________________________________________________  __        Interpretation Summary  No pericardial effusion is present.  Global and regional left ventricular function is normal with an EF of 55-60%.  Left ventricular diastolic function is normal.  The right ventricle is normal size.  Both atria appear normal.  Trace mitral insufficiency is present.  Mild aortic valve calcification is present.  Trace aortic insufficiency is present.  Trace tricuspid insufficiency is  present.  Right ventricular systolic pressure is 28mmHg above the right atrial pressure.  Trace pulmonic insufficiency is present.  The aorta root is normal.  _____________________________________________________________________________  __        Left Ventricle  Global and regional left ventricular function is normal with an EF of 55-60%.  Left ventricular diastolic function is normal.     Right Ventricle  The right ventricle is normal size.     Atria  Both atria appear normal.     Mitral Valve  Trace mitral insufficiency is present.     Aortic Valve  Mild aortic valve calcification is present. Trace aortic insufficiency is  present. The mean AoV pressure gradient is 4.2 mmHg.        Tricuspid Valve  Trace tricuspid insufficiency is present. Right ventricular systolic pressure  is 28mmHg above the right atrial pressure.     Pulmonic Valve  Trace pulmonic insufficiency is present.     Vessels  The aorta root is normal.     Pericardium  No pericardial effusion is present.     _____________________________________________________________________________  __  MMode/2D Measurements & Calculations  IVSd: 1.1 cm  IVSs: 1.4 cm  LVIDd: 5.0 cm  LVIDs: 3.6 cm  LVPWd: 1.2 cm  LVPWs: 1.7 cm  FS: 29.0 %  LV mass(C)d: 220.3 grams  LV mass(C)dI: 140.8 grams/m2  LV mass(C)sI: 129.5 grams/m2  Ao root diam: 3.1 cm  LA dimension: 3.8 cm  LA/Ao: 1.2  LVOT diam: 1.6 cm  LVOT area: 2.1 cm2     RWT: 0.49     Time Measurements  Pulm. HR: 157.7 BPM     Doppler Measurements & Calculations  MV E max tere: 101.0 cm/sec  MV A max tere: 110.8 cm/sec  MV E/A: 0.91  MV dec slope: 431.5 cm/sec2  MV dec time: 0.23 sec  Ao V2 max: 133.8 cm/sec  Ao max P.2 mmHg  Ao V2 mean: 96.0 cm/sec  Ao mean P.2 mmHg  Ao V2 VTI: 36.9 cm  AMANDO(I,D): 1.5 cm2  AMANDO(V,D): 1.4 cm2  LV V1 max PG: 3.5 mmHg  LV V1 max: 93.3 cm/sec  LV V1 VTI: 27.0 cm  CO(LVOT): 8.7 l/min  CI(LVOT): 5.6 l/min/m2  SV(LVOT): 55.8 ml  SI(LVOT): 35.6 ml/m2  TV max P.8 mmHg  PA V2 max:  "94.3 cm/sec  PA max PG: 3.6 mmHg  PA mean P.9 mmHg  PA V2 VTI: 25.1 cm  TR max true: 263.8 cm/sec  TR max P.8 mmHg  AV True Ratio (DI): 0.70  AMANDO Index (cm2/m2): 0.97        _____________________________________________________________________________  __           Report approved by: Ian Wu 2019 01:23 PM      CBC with platelets   Result Value Ref Range    WBC 7.3 4.0 - 11.0 10e9/L    RBC Count 3.94 3.8 - 5.2 10e12/L    Hemoglobin 12.8 11.7 - 15.7 g/dL    Hematocrit 36.8 35.0 - 47.0 %    MCV 93 78 - 100 fl    MCH 32.5 26.5 - 33.0 pg    MCHC 34.8 31.5 - 36.5 g/dL    RDW 12.2 10.0 - 15.0 %    Platelet Count 142 (L) 150 - 450 10e9/L   Basic metabolic panel   Result Value Ref Range    Sodium 138 133 - 144 mmol/L    Potassium 4.3 3.4 - 5.3 mmol/L    Chloride 105 94 - 109 mmol/L    Carbon Dioxide 28 20 - 32 mmol/L    Anion Gap 5 3 - 14 mmol/L    Glucose 133 (H) 70 - 99 mg/dL    Urea Nitrogen 19 7 - 30 mg/dL    Creatinine 0.66 0.52 - 1.04 mg/dL    GFR Estimate 80 >60 mL/min/[1.73_m2]    GFR Estimate If Black >90 >60 mL/min/[1.73_m2]    Calcium 8.7 8.5 - 10.1 mg/dL     A/P  #1 Multiple left sided rib fractures    Looking pretty good this morning.  Just needed the one block to get over the \"hump\".  No indication that another block is needed.  On a good regimen of Lidoderm, flexeril, hydrocodone and Toradol.  Spoke with Dr. Mcconnell.  We've watched her for 4 days now and only had one slight decrease in pulmonary status.  She looks much better.  I had a long talk with the patient about how she needs to continue her pulmonary toilet at home for a month to decrease the risk of pneumonia.  She can discharge with the pain regimen we have her on here with ibuprofen substituting for Toradol. Surgery will sign off.  Please don't hesitate to call with any questions.   " home

## 2021-02-13 ENCOUNTER — APPOINTMENT (OUTPATIENT)
Dept: GENERAL RADIOLOGY | Facility: HOSPITAL | Age: 86
End: 2021-02-13
Attending: EMERGENCY MEDICINE
Payer: MEDICARE

## 2021-02-13 ENCOUNTER — HOSPITAL ENCOUNTER (EMERGENCY)
Facility: HOSPITAL | Age: 86
Discharge: ANOTHER HOSPITAL | End: 2021-02-13
Attending: EMERGENCY MEDICINE | Admitting: EMERGENCY MEDICINE
Payer: MEDICARE

## 2021-02-13 ENCOUNTER — APPOINTMENT (OUTPATIENT)
Dept: CT IMAGING | Facility: HOSPITAL | Age: 86
End: 2021-02-13
Attending: EMERGENCY MEDICINE
Payer: MEDICARE

## 2021-02-13 ENCOUNTER — TRANSFERRED RECORDS (OUTPATIENT)
Dept: HEALTH INFORMATION MANAGEMENT | Facility: CLINIC | Age: 86
End: 2021-02-13

## 2021-02-13 VITALS
TEMPERATURE: 97.6 F | HEART RATE: 42 BPM | RESPIRATION RATE: 25 BRPM | SYSTOLIC BLOOD PRESSURE: 154 MMHG | DIASTOLIC BLOOD PRESSURE: 74 MMHG | OXYGEN SATURATION: 97 %

## 2021-02-13 DIAGNOSIS — R06.02 SOB (SHORTNESS OF BREATH): ICD-10-CM

## 2021-02-13 DIAGNOSIS — M25.519 SHOULDER PAIN, UNSPECIFIED CHRONICITY, UNSPECIFIED LATERALITY: ICD-10-CM

## 2021-02-13 DIAGNOSIS — R00.1 BRADYCARDIA: ICD-10-CM

## 2021-02-13 DIAGNOSIS — I71.40 ABDOMINAL AORTIC ANEURYSM (AAA), 30-34 MM DIAMETER (H): ICD-10-CM

## 2021-02-13 DIAGNOSIS — R07.9 CHEST PAIN, UNSPECIFIED TYPE: ICD-10-CM

## 2021-02-13 DIAGNOSIS — E07.9 THYROID MASS: ICD-10-CM

## 2021-02-13 DIAGNOSIS — Z95.5 H/O HEART ARTERY STENT: ICD-10-CM

## 2021-02-13 DIAGNOSIS — R91.8 PULMONARY NODULES: ICD-10-CM

## 2021-02-13 DIAGNOSIS — R79.89 ELEVATED D-DIMER: ICD-10-CM

## 2021-02-13 DIAGNOSIS — R93.89 ABNORMAL CT OF THE CHEST: ICD-10-CM

## 2021-02-13 LAB
ANION GAP SERPL CALCULATED.3IONS-SCNC: 4 MMOL/L (ref 3–14)
BASOPHILS # BLD AUTO: 0 10E9/L (ref 0–0.2)
BASOPHILS NFR BLD AUTO: 0.1 %
BUN SERPL-MCNC: 26 MG/DL (ref 7–30)
CALCIUM SERPL-MCNC: 9.2 MG/DL (ref 8.5–10.1)
CHLORIDE SERPL-SCNC: 106 MMOL/L (ref 94–109)
CO2 SERPL-SCNC: 30 MMOL/L (ref 20–32)
CREAT SERPL-MCNC: 0.93 MG/DL (ref 0.52–1.04)
D DIMER PPP FEU-MCNC: 2.3 UG/ML FEU (ref 0–0.5)
DIFFERENTIAL METHOD BLD: NORMAL
EOSINOPHIL # BLD AUTO: 0 10E9/L (ref 0–0.7)
EOSINOPHIL NFR BLD AUTO: 0.6 %
ERYTHROCYTE [DISTWIDTH] IN BLOOD BY AUTOMATED COUNT: 13 % (ref 10–15)
FLUAV RNA RESP QL NAA+PROBE: NEGATIVE
FLUBV RNA RESP QL NAA+PROBE: NEGATIVE
GFR SERPL CREATININE-BSD FRML MDRD: 55 ML/MIN/{1.73_M2}
GLUCOSE SERPL-MCNC: 137 MG/DL (ref 70–99)
HCT VFR BLD AUTO: 38.9 % (ref 35–47)
HGB BLD-MCNC: 13.1 G/DL (ref 11.7–15.7)
IMM GRANULOCYTES # BLD: 0 10E9/L (ref 0–0.4)
IMM GRANULOCYTES NFR BLD: 0.3 %
LABORATORY COMMENT REPORT: NORMAL
LYMPHOCYTES # BLD AUTO: 1.6 10E9/L (ref 0.8–5.3)
LYMPHOCYTES NFR BLD AUTO: 22.3 %
MCH RBC QN AUTO: 32.6 PG (ref 26.5–33)
MCHC RBC AUTO-ENTMCNC: 33.7 G/DL (ref 31.5–36.5)
MCV RBC AUTO: 97 FL (ref 78–100)
MONOCYTES # BLD AUTO: 0.4 10E9/L (ref 0–1.3)
MONOCYTES NFR BLD AUTO: 5.8 %
NEUTROPHILS # BLD AUTO: 5.1 10E9/L (ref 1.6–8.3)
NEUTROPHILS NFR BLD AUTO: 70.9 %
NRBC # BLD AUTO: 0 10*3/UL
NRBC BLD AUTO-RTO: 0 /100
NT-PROBNP SERPL-MCNC: 2272 PG/ML (ref 0–1800)
PLATELET # BLD AUTO: 171 10E9/L (ref 150–450)
POTASSIUM SERPL-SCNC: 3.9 MMOL/L (ref 3.4–5.3)
PROCALCITONIN SERPL-MCNC: <0.05 NG/ML
RBC # BLD AUTO: 4.02 10E12/L (ref 3.8–5.2)
RSV RNA SPEC QL NAA+PROBE: NEGATIVE
SARS-COV-2 RNA RESP QL NAA+PROBE: NEGATIVE
SODIUM SERPL-SCNC: 140 MMOL/L (ref 133–144)
SPECIMEN SOURCE: NORMAL
TROPONIN I SERPL-MCNC: <0.015 UG/L (ref 0–0.04)
TSH SERPL DL<=0.005 MIU/L-ACNC: 2.66 MU/L (ref 0.4–4)
WBC # BLD AUTO: 7.2 10E9/L (ref 4–11)

## 2021-02-13 PROCEDURE — C9803 HOPD COVID-19 SPEC COLLECT: HCPCS

## 2021-02-13 PROCEDURE — 96374 THER/PROPH/DIAG INJ IV PUSH: CPT | Mod: XU

## 2021-02-13 PROCEDURE — 71275 CT ANGIOGRAPHY CHEST: CPT

## 2021-02-13 PROCEDURE — 99285 EMERGENCY DEPT VISIT HI MDM: CPT | Mod: 25

## 2021-02-13 PROCEDURE — 80048 BASIC METABOLIC PNL TOTAL CA: CPT | Performed by: EMERGENCY MEDICINE

## 2021-02-13 PROCEDURE — 93010 ELECTROCARDIOGRAM REPORT: CPT | Performed by: INTERNAL MEDICINE

## 2021-02-13 PROCEDURE — 93005 ELECTROCARDIOGRAM TRACING: CPT

## 2021-02-13 PROCEDURE — 71045 X-RAY EXAM CHEST 1 VIEW: CPT

## 2021-02-13 PROCEDURE — 250N000011 HC RX IP 250 OP 636: Performed by: EMERGENCY MEDICINE

## 2021-02-13 PROCEDURE — 84443 ASSAY THYROID STIM HORMONE: CPT | Performed by: EMERGENCY MEDICINE

## 2021-02-13 PROCEDURE — 84484 ASSAY OF TROPONIN QUANT: CPT | Performed by: EMERGENCY MEDICINE

## 2021-02-13 PROCEDURE — 84145 PROCALCITONIN (PCT): CPT | Performed by: EMERGENCY MEDICINE

## 2021-02-13 PROCEDURE — 85025 COMPLETE CBC W/AUTO DIFF WBC: CPT | Performed by: EMERGENCY MEDICINE

## 2021-02-13 PROCEDURE — 83880 ASSAY OF NATRIURETIC PEPTIDE: CPT | Performed by: EMERGENCY MEDICINE

## 2021-02-13 PROCEDURE — 87636 SARSCOV2 & INF A&B AMP PRB: CPT | Performed by: EMERGENCY MEDICINE

## 2021-02-13 PROCEDURE — 85379 FIBRIN DEGRADATION QUANT: CPT | Performed by: EMERGENCY MEDICINE

## 2021-02-13 PROCEDURE — 99285 EMERGENCY DEPT VISIT HI MDM: CPT | Performed by: EMERGENCY MEDICINE

## 2021-02-13 PROCEDURE — 36415 COLL VENOUS BLD VENIPUNCTURE: CPT

## 2021-02-13 RX ORDER — FUROSEMIDE 10 MG/ML
40 INJECTION INTRAMUSCULAR; INTRAVENOUS ONCE
Status: COMPLETED | OUTPATIENT
Start: 2021-02-13 | End: 2021-02-13

## 2021-02-13 RX ORDER — IOPAMIDOL 755 MG/ML
100 INJECTION, SOLUTION INTRAVASCULAR ONCE
Status: COMPLETED | OUTPATIENT
Start: 2021-02-13 | End: 2021-02-13

## 2021-02-13 RX ADMIN — FUROSEMIDE 40 MG: 10 INJECTION, SOLUTION INTRAMUSCULAR; INTRAVENOUS at 16:12

## 2021-02-13 RX ADMIN — IOPAMIDOL 75 ML: 755 INJECTION, SOLUTION INTRAVENOUS at 14:43

## 2021-02-13 NOTE — ED TRIAGE NOTES
Patient presents with SOB, states she just has been feeling exhausted. Hx of asthma and heart issues per patient, so she's not sure what the problem is today. Per grandson she does have a loop recorder as well as a stent.

## 2021-02-13 NOTE — ED PROVIDER NOTES
"  History     Chief Complaint   Patient presents with     Shortness of Breath     Patient presents with SOB, states she just has been feeling exhausted. Hx of asthma and heart issues per patient, so she's not sure what the problem is today. Per grandson she does have a loop recorder as well as a stent.      LAMIN Gilliam is a 86 year old female who arrives through triage with grandson with shortness of breath.  History is obtained during the [place] pandemic and standard precautions.  She has a history of reported asthma as well as coronary disease with a stent placed in 2010 at [facility] in [place].  Patient also notes she has a loop recorder.    The patient she has had 2 weeks of feeling generally weak without associated runny nose, sore throat, abdominal pain, diarrhea or bloody stools.  She denies any weight change or leg swelling.  She has had a dry cough.  She also notes episodic shoulder and neck pain that last occurred last evening while playing bingo.  She also reports that over last 2 weeks with activity she has had progressive shortness of breath and dyspnea on exertion.  She has had no hemoptysis, unilateral calf pain, orthopnea or PND.  She reports during the event of shoulder discomfort last night she also had nontearing, nonripping, nonpleuritic anterior chest discomfort that has abated.  She presents now to generalized weakness and feelings of shortness of breath with activity.    Patient notes upon my assessment that she feels back to normal and feels \"much less anxious now that she is in the ER.\"    Patient notes she has had aspirin today.  And has had her other medications this morning.  Patient denies any loss of or smell.  She has had no Covid exposures.  No myalgias.        Allergies:  Allergies   Allergen Reactions     Niacin Rash       Problem List:    Patient Active Problem List    Diagnosis Date Noted     Coronary atherosclerosis 12/21/2019     Priority: Medium     " Disorder of bone and cartilage 12/21/2019     Priority: Medium     Hyperlipidemia 12/21/2019     Priority: Medium     Hypertension 12/21/2019     Priority: Medium     Macular degeneration 12/21/2019     Priority: Medium     Occlusion and stenosis of carotid artery without mention of cerebral infarction 12/21/2019     Priority: Medium     Overview:   with 50-75% stenoses of left, right internal carotid arteries, documented by   US 5/15/02 and 06/16/04       Closed fracture of multiple ribs of left side 12/21/2019     Priority: Medium     Syncope 12/21/2019     Priority: Medium     Multiple rib fractures 12/21/2019     Priority: Medium     Vitamin D deficiency      Priority: Medium     Vitamin B 12 deficiency      Priority: Medium     Peripheral vascular disease (H)      Priority: Medium     Iron deficiency anemia      Priority: Medium     History of MI (myocardial infarction)      Priority: Medium     GI hemorrhage      Priority: Medium     Essential thrombocytosis (H)      Priority: Medium     CAD (coronary artery disease)      Priority: Medium     Aortic valve sclerosis      Priority: Medium     Anemia 05/12/2016     Priority: Medium     Anxiety about health 11/04/2014     Priority: Medium     Panic disorder 11/04/2014     Priority: Medium     Asthma 10/11/2011     Priority: Medium     Lumbago 09/06/2006     Priority: Medium     IMO Update 10/11       Sacroiliitis, not elsewhere classified (H) 09/06/2006     Priority: Medium     IMO Update 10/11          Past Medical History:    Past Medical History:   Diagnosis Date     Age-related macular degeneration      Aortic valve sclerosis      CAD (coronary artery disease)      Essential thrombocytosis (H)      GI hemorrhage      History of MI (myocardial infarction)      Hypertension      Iron deficiency anemia      Mixed hyperlipidemia      Peripheral vascular disease (H)      Vitamin B 12 deficiency      Vitamin D deficiency        Past Surgical History:    Past  Surgical History:   Procedure Laterality Date     ESOPHAGOSCOPY, GASTROSCOPY, DUODENOSCOPY (EGD), COMBINED N/A 2017    Procedure: COMBINED ESOPHAGOSCOPY, GASTROSCOPY, DUODENOSCOPY (EGD);  UPPER ENDOSCOPY WITH BIOPSIES, ENTEROSCOPY WITH  SMALL BOWEL ARGON PLASMA COAGULATION;  Surgeon: Robin Vaughn MD;  Location: [facility]     IA PATIENT HAS A CORONARY ARTERY STENT         Family History:    Family History   Problem Relation Age of Onset     Heart Disease Father        Social History:  Marital Status:   [5]  Social History     Tobacco Use     Smoking status: Former Smoker     Types: Cigarettes     Quit date:      Years since quittin.1     Smokeless tobacco: Never Used   Substance Use Topics     Alcohol use: Yes     Comment: occasionally     Drug use: Never        Medications:         amLODIPine (NORVASC) 5 MG tablet       aspirin 81 MG EC tablet       atorvastatin (LIPITOR) 20 MG tablet       Calcium Carb-Cholecalciferol (CALCIUM PLUS VITAMIN D3 PO)       cyanocobalamin (VITAMIN B-12) 100 MCG tablet       ferrous sulfate (IRON) 325 (65 FE) MG tablet       fluticasone-vilanterol (BREO ELLIPTA) 100-25 MCG/INH oral inhaler       metoprolol tartrate (LOPRESSOR) 50 MG tablet       Multiple Vitamins-Minerals (ICAPS PO)       Nitroglycerin (NITROTAB SL)       terazosin (HYTRIN) 10 MG capsule          Review of Systems   Per HPI.  All other 10 negative    Physical Exam   BP: 162/65  Pulse: (!) 44  Temp: 97.6  F (36.4  C)  Resp: 20  SpO2: (!) 91 %      Physical Exam     Nursing note and vitals reviewed.  Constitutional: The patient appears is slightly hard of hearing.  She is playful and states she feels well upon my assessment without any ongoing discomfort or ongoing shortness of breath now that she is arrived at the hospital.  HENT:   Mouth/Throat: Oropharynx is clear and moist.        Normal inspection   Eyes: Pupils are equal, round, and reactive to light.   Neck: Trachea normal. Neck supple. No  rigidity. No tracheal deviation present.  No JVD.  Cardiovascular: Bradycardic rate, regular rhythm, normal heart sounds and intact distal pulses.  No murmurs.  Pulmonary/Chest: Effort normal and breath sounds normal. No stridor. No respiratory distress.   Abdominal: Soft. Bowel sounds are normal. The patient exhibits no pulsatile midline mass. There is no tenderness.   Musculoskeletal: Normal range of motion. No signs of swelling.  No unilateral edema or cords.  No pitting edema.  Neurological: The patient is alert.  Fluent speech.  GCS 15.  Polite and conversant.  No focal arm or leg weakness.  Skin: Skin is warm and dry. No rash noted.   Psychiatric: The patient's behavior is normal.      ED Course     ED Course as of Feb 13 1609   Sat Feb 13, 2021   1330 Vitals reviewed (bP pending)  Speaks full sentences  Ekg ordered.  My history and exam in N95.       1356 No chest pain or ongoing discomfort  Feels markedly improved in ED  HR ~40. Nsr on monitor  No wheezing  No jvd        1409 Renal function normal  D-dimer 2.3  Hgb normal.  Trop pending.       1413 CXR in process.       1415 Trop neg.  CTA chest ordered.       1417 Cxr: No sign chf or focal infiltrate. Has interstitial thickening without cephalization or cardiomegaly  BNP noted, ? PE as cause  Neg trop despite 2 weeks of sob and episodic neck/shoulder pain.       1423 Patient updated. No cp or sob. She notes previous cardiac care with [facility], for loop recorder implant 2-2020.        1436 Covid neg      1531 CTA noted.   Will give IV lasix.       1538 Patient updated.  [facility] called for transfer, needs cardiology evaluation, not available here.  Patient has established relationship with cardiology at [place].       1548 Dr. Paige, hospitalist, [place].  Tele bed requested      1554 [facility] to Tele-bed 6 East.  [facility].        1559 OLGA ahn MD to MD report and transfer forms/ekg ready for transfer.  Benefit exceed risk.           Procedures          EKG: narrow complex fabienne.  Rate 38 bpm.  Septal Q waves.  No previous EKG for comparison.  QRS duration 94 ms.  Qtc 413.no acute stemi. No old ekg for comparison.    Ddx sinus fabienne vs 2nd degree Mobitz two type 2.         Results for orders placed or performed during the hospital encounter of 02/13/21 (from the past 24 hour(s))   Symptomatic Influenza A/B & SARS-CoV2 (COVID-19) Virus PCR Multiplex    Specimen: Nasopharyngeal   Result Value Ref Range    Flu A/B & SARS-COV-2 PCR Source Nasopharyngeal     SARS-CoV-2 PCR Result NEGATIVE     Influenza A PCR Negative NEG^Negative    Influenza B PCR Negative NEG^Negative    Respiratory Syncytial Virus PCR Negative NEG^Negative    Flu A/B & SARS-CoV-2 PCR Comment       Testing was performed using the Xpert Xpress SARS-CoV2/Flu/RSV Assay on the Cepheid   GeneXpert Instrument. Additional information about the Emergency Use Authorization (EUA)   assay can be found via the Lab Guide.     CBC with platelets differential   Result Value Ref Range    WBC 7.2 4.0 - 11.0 10e9/L    RBC Count 4.02 3.8 - 5.2 10e12/L    Hemoglobin 13.1 11.7 - 15.7 g/dL    Hematocrit 38.9 35.0 - 47.0 %    MCV 97 78 - 100 fl    MCH 32.6 26.5 - 33.0 pg    MCHC 33.7 31.5 - 36.5 g/dL    RDW 13.0 10.0 - 15.0 %    Platelet Count 171 150 - 450 10e9/L    Diff Method Automated Method     % Neutrophils 70.9 %    % Lymphocytes 22.3 %    % Monocytes 5.8 %    % Eosinophils 0.6 %    % Basophils 0.1 %    % Immature Granulocytes 0.3 %    Nucleated RBCs 0 0 /100    Absolute Neutrophil 5.1 1.6 - 8.3 10e9/L    Absolute Lymphocytes 1.6 0.8 - 5.3 10e9/L    Absolute Monocytes 0.4 0.0 - 1.3 10e9/L    Absolute Eosinophils 0.0 0.0 - 0.7 10e9/L    Absolute Basophils 0.0 0.0 - 0.2 10e9/L    Abs Immature Granulocytes 0.0 0 - 0.4 10e9/L    Absolute Nucleated RBC 0.0    D dimer quantitative   Result Value Ref Range    D Dimer 2.3 (H) 0.0 - 0.50 ug/ml FEU   Basic metabolic panel    Result Value Ref Range    Sodium 140 133 - 144 mmol/L    Potassium 3.9 3.4 - 5.3 mmol/L    Chloride 106 94 - 109 mmol/L    Carbon Dioxide 30 20 - 32 mmol/L    Anion Gap 4 3 - 14 mmol/L    Glucose 137 (H) 70 - 99 mg/dL    Urea Nitrogen 26 7 - 30 mg/dL    Creatinine 0.93 0.52 - 1.04 mg/dL    GFR Estimate 55 (L) >60 mL/min/[1.73_m2]    GFR Estimate If Black 64 >60 mL/min/[1.73_m2]    Calcium 9.2 8.5 - 10.1 mg/dL   Procalcitonin   Result Value Ref Range    Procalcitonin <0.05 ng/ml   TSH with free T4 reflex   Result Value Ref Range    TSH 2.66 0.40 - 4.00 mU/L   NT pro BNP   Result Value Ref Range    N-Terminal Pro BNP Inpatient 2,272 (H) 0 - 1,800 pg/mL   Troponin I   Result Value Ref Range    Troponin I ES <0.015 0.000 - 0.045 ug/L   XR Chest Port 1 View    Narrative    PROCEDURE:  XR CHEST PORT 1 VW    HISTORY:  sob.     COMPARISON:  2019    FINDINGS:   The cardiac silhouette is normal in size. The pulmonary vasculature is  normal.  There is some mild chronic interstitial thickening stable  from 2019 No pleural effusion or pneumothorax.      Impression    IMPRESSION:  Chronic interstitial thickening. No active pulmonary  infiltrates.      SHAAN BAZZI MD   CTA Chest with Contrast    Narrative    PROCEDURE: CTA CHEST WITH CONTRAST 2/13/2021 3:02 PM    HISTORY: PE suspected, low/intermediate prob, positive D-dimer    COMPARISONS: None.    Meds/Dose Given: Isovue 370 (75 mL)    TECHNIQUE: CT angiogram of the chest with sagittal coronal MIPS  reconstructions    FINDINGS: CT angiogram reveals no pulmonary emboli. The heart is  normal in size. Atherosclerotic plaquing is seen in the thoracic  aorta.    There is a 4.5 cm in diameter right thyroid mass with peripheral  calcifications. This mass is suspicious for malignancy. There is a  left thyroid nodule measuring 1.5 cm in diameter. The supraclavicular  axillary mediastinal and hilar lymph nodes are normal in caliber.    Emphysematous changes are noted in the lungs.  There is mild  interstitial thickening diffusely in the lungs. There is a subpleural  nodule seen in the right middle lobe on series 5 axial image 94  measuring 5 mm in diameter. There is a 3 mm subpleural nodule in the  right lower lobe on series 5 axial image 90. There are small bilateral  pleural effusions. The upper portion of the liver spleen and pancreas  appear normal. Degenerative changes are present in the thoracic spine.   There is an infrarenal abdominal aortic aneurysm although the entire  aneurysm was not included in the study. The visualized portion of the  aortic aneurysm measures 3.2 cm.         Impression    IMPRESSION: No pulmonary emboli.    Emphysema interstitial thickening and small bilateral pleural  effusions.    2 small nodules are noted in the right lung.    Infrarenal abdominal aortic aneurysm measuring at least 3.2 cm    Thyroid nodules    SHAAN BAZZI MD       Medications   furosemide (LASIX) injection 40 mg (has no administration in time range)   sodium chloride (PF) 0.9% PF flush 100 mL (100 mLs Intravenous Given 2/13/21 1444)   iopamidol (ISOVUE-370) solution 100 mL (75 mLs Intravenous Given 2/13/21 1443)     Medications   furosemide (LASIX) injection 40 mg (has no administration in time range)   sodium chloride (PF) 0.9% PF flush 100 mL (100 mLs Intravenous Given 2/13/21 1444)   iopamidol (ISOVUE-370) solution 100 mL (75 mLs Intravenous Given 2/13/21 1443)       Assessments & Plan (with Medical Decision Making)     MDM:  Frances Gilliam 86 year old 4/4/1934 complicated past history presents here with episodic chest discomfort and shoulder discomfort, last occurring last evening and 2 weeks of shortness of breath with exertion.  Differential is quite broad.  There is no evidence of pulmonary embolism today.  Patient initial troponin is negative.  She has no signs of pneumonia, pneumothorax or acute infectious etiology.  Patient symptoms certainly could be anginal equivalent.  Patient  is bradycardiac and has a reported loop monitor in place for a history of Mobitz second-degree type II AV block without previous pacemaker placement, although I do not see a device on imaging.  Due to the patient's complexity of care, need for cardiology evaluation and previous established relationship with cardiology at [facility] I have arranged for transfer to a higher level of care.  We will hold her beta-blocker due to bradycardia.  Patient's blood pressure in the ER is normal however.  She has had no chest pain or shortness of breath in the ER.  Screening for Covid is negative.  She has no other Covid symptomatology.  Patient does have associated COPD, emphysema, pulmonary nodules and incidental CT findings that require follow-up and further evaluation.  No clinical evidence of ruptured abdominal aortic aneurysm, thyroid abnormalities appear to be present on old CT scan but is not clear if patient is aware of such.  Copy of CT results are included with transfer paperwork and this documentation for the receiving physician to further coordinate urgent work-up for her presenting symptomatology and subsequent follow-up.  I have updated the patient and her grandson at the bedside that the CT has incidental findings that will require follow-up.          Final diagnoses:   Bradycardia   SOB (shortness of breath)   Chest pain, unspecified type   Shoulder pain, unspecified chronicity, unspecified laterality   Elevated d-dimer   H/O heart artery stent - [facility] at [facility] and loop recorder at [facility] with h/o Mobitz type 2 AV  block.   Abnormal CT of the chest - see attached/EPIC results.   Abdominal aortic aneurysm (AAA), 30-34 mm diameter (H)   Pulmonary nodules   Thyroid mass - concerning for malignancy per CT Scan.     Disposition: [facility] to [facility] telemetry bed.      2/13/2021   [facility]     Jonathan Merrill MD  02/13/21 0057       Jonathan Merrill  MD Edgar  02/13/21 [id number]

## 2021-02-14 ENCOUNTER — TRANSFERRED RECORDS (OUTPATIENT)
Dept: HEALTH INFORMATION MANAGEMENT | Facility: CLINIC | Age: 86
End: 2021-02-14

## 2021-09-30 NOTE — ED NOTES
"\"Had a liquid and medium formed stool.\"  " PT DAILY TREATMENT NOTE - [facility]     Patient Name: Kj Ho  Date:2021  : 1932  [x]  Patient  Verified  Payor: Leigha Daniels / Plan: [place] & B / Product Type: Medicare /    In time:11:30a  Out time: 12:08p  Total Treatment Time (min): 38  Total Timed Codes (min): 38  1:1 Treatment Time ( only): 38   Visit #: 17 of 20    Treatment Area: Muscle weakness (generalized) [M62.81]    SUBJECTIVE  Pain Level (0-10 scale): 0/10  Any medication changes, allergies to medications, adverse drug reactions, diagnosis change, or new procedure performed?: [x] No    [] Yes (see summary sheet for update)  Subjective functional status/changes:   [] No changes reported  Pt reports feeling good. OBJECTIVE    10 min Therapeutic Exercise:  [x] See flow sheet :   Rationale: increase strength to improve the patients ability to perform daily activities with proper form    28 min Neuromuscular Re-education:  [x]  See flow sheet :   Rationale: improve coordination, improve balance and increase proprioception  to improve the patients ability to ambulate with decreased risk for falling, perform standing activities with independence, improve balance strategies for mild LOB       With   [] TE   [] TA   [] neuro   [] other: Patient Education: [x] Review HEP    [] Progressed/Changed HEP based on:   [] positioning   [] body mechanics   [] transfers   [] heat/ice application    [] other:      Other Objective/Functional Measures: Pt ambulates with heel strike and minimal reciprocal arm swing with WBOS. Pain Level (0-10 scale) post treatment: 0/10    ASSESSMENT/Changes in Function: Patient continues to improve form with cueing. She was able to ambulate distances with cues to stop and restart with no LOB. Pt at times in tight areas or when changing directions hesitates and exhibited LOB, but was able to initiate gait this session with no hesitation or evidence of LOB.  She is more comfortable performing standing LE exercises with 1 UE support vs 2 and does not rely on it to maintain her balance as much. Patient will continue to benefit from skilled PT services to modify and progress therapeutic interventions, address functional mobility deficits, address strength deficits, analyze and cue movement patterns and address imbalance/dizziness to attain remaining goals. [x]  See Plan of Care  []  See progress note/recertification  []  See Discharge Summary         Progress towards goals / Updated goals:  Pt is progressing towards goals.     PLAN  [x]  Upgrade activities as tolerated     [x]  Continue plan of care  [x]  Update interventions per flow sheet       []  Discharge due to:_  []  Other:_      Irvin Allen, PTA 9/30/2021

## 2022-02-16 ENCOUNTER — APPOINTMENT (OUTPATIENT)
Dept: ULTRASOUND IMAGING | Facility: HOSPITAL | Age: 87
End: 2022-02-16
Attending: EMERGENCY MEDICINE
Payer: MEDICARE

## 2022-02-16 ENCOUNTER — APPOINTMENT (OUTPATIENT)
Dept: GENERAL RADIOLOGY | Facility: HOSPITAL | Age: 87
End: 2022-02-16
Attending: EMERGENCY MEDICINE
Payer: MEDICARE

## 2022-02-16 ENCOUNTER — TRANSFERRED RECORDS (OUTPATIENT)
Dept: HEALTH INFORMATION MANAGEMENT | Facility: CLINIC | Age: 87
End: 2022-02-16

## 2022-02-16 ENCOUNTER — HOSPITAL ENCOUNTER (EMERGENCY)
Facility: HOSPITAL | Age: 87
Discharge: ANOTHER HOSPITAL | End: 2022-02-16
Attending: EMERGENCY MEDICINE | Admitting: EMERGENCY MEDICINE
Payer: MEDICARE

## 2022-02-16 VITALS
HEART RATE: 78 BPM | DIASTOLIC BLOOD PRESSURE: 75 MMHG | OXYGEN SATURATION: 99 % | BODY MASS INDEX: 24.96 KG/M2 | WEIGHT: 123.6 LBS | TEMPERATURE: 97.9 F | SYSTOLIC BLOOD PRESSURE: 151 MMHG | RESPIRATION RATE: 16 BRPM

## 2022-02-16 DIAGNOSIS — I21.4 NSTEMI (NON-ST ELEVATED MYOCARDIAL INFARCTION) (H): ICD-10-CM

## 2022-02-16 LAB
ALBUMIN SERPL-MCNC: 3.2 G/DL (ref 3.4–5)
ALP SERPL-CCNC: 116 U/L (ref 40–150)
ALT SERPL W P-5'-P-CCNC: 24 U/L (ref 0–50)
ANION GAP SERPL CALCULATED.3IONS-SCNC: 6 MMOL/L (ref 3–14)
APTT PPP: 31 SECONDS (ref 22–38)
AST SERPL W P-5'-P-CCNC: 18 U/L (ref 0–45)
BASOPHILS # BLD AUTO: 0 10E3/UL (ref 0–0.2)
BASOPHILS NFR BLD AUTO: 1 %
BILIRUB SERPL-MCNC: 0.5 MG/DL (ref 0.2–1.3)
BUN SERPL-MCNC: 16 MG/DL (ref 7–30)
CALCIUM SERPL-MCNC: 9.3 MG/DL (ref 8.5–10.1)
CHLORIDE BLD-SCNC: 105 MMOL/L (ref 94–109)
CO2 SERPL-SCNC: 27 MMOL/L (ref 20–32)
CREAT SERPL-MCNC: 0.76 MG/DL (ref 0.52–1.04)
EOSINOPHIL # BLD AUTO: 0.4 10E3/UL (ref 0–0.7)
EOSINOPHIL NFR BLD AUTO: 5 %
ERYTHROCYTE [DISTWIDTH] IN BLOOD BY AUTOMATED COUNT: 12.8 % (ref 10–15)
GFR SERPL CREATININE-BSD FRML MDRD: 75 ML/MIN/1.73M2
GLUCOSE BLD-MCNC: 106 MG/DL (ref 70–99)
HCT VFR BLD AUTO: 39.6 % (ref 35–47)
HGB BLD-MCNC: 12.9 G/DL (ref 11.7–15.7)
HOLD SPECIMEN: NORMAL
IMM GRANULOCYTES # BLD: 0 10E3/UL
IMM GRANULOCYTES NFR BLD: 1 %
INR PPP: 1.01 (ref 0.85–1.15)
LACTATE SERPL-SCNC: 0.6 MMOL/L (ref 0.7–2)
LIPASE SERPL-CCNC: 192 U/L (ref 73–393)
LYMPHOCYTES # BLD AUTO: 1 10E3/UL (ref 0.8–5.3)
LYMPHOCYTES NFR BLD AUTO: 15 %
MCH RBC QN AUTO: 30.6 PG (ref 26.5–33)
MCHC RBC AUTO-ENTMCNC: 32.6 G/DL (ref 31.5–36.5)
MCV RBC AUTO: 94 FL (ref 78–100)
MONOCYTES # BLD AUTO: 0.7 10E3/UL (ref 0–1.3)
MONOCYTES NFR BLD AUTO: 11 %
NEUTROPHILS # BLD AUTO: 4.6 10E3/UL (ref 1.6–8.3)
NEUTROPHILS NFR BLD AUTO: 67 %
NRBC # BLD AUTO: 0 10E3/UL
NRBC BLD AUTO-RTO: 0 /100
NT-PROBNP SERPL-MCNC: 831 PG/ML (ref 0–1800)
PLATELET # BLD AUTO: 290 10E3/UL (ref 150–450)
POTASSIUM BLD-SCNC: 3.7 MMOL/L (ref 3.4–5.3)
PROT SERPL-MCNC: 6.8 G/DL (ref 6.8–8.8)
RBC # BLD AUTO: 4.22 10E6/UL (ref 3.8–5.2)
SARS-COV-2 RNA RESP QL NAA+PROBE: NEGATIVE
SODIUM SERPL-SCNC: 138 MMOL/L (ref 133–144)
TROPONIN I SERPL HS-MCNC: 191 NG/L
TROPONIN I SERPL HS-MCNC: 31 NG/L
WBC # BLD AUTO: 6.6 10E3/UL (ref 4–11)

## 2022-02-16 PROCEDURE — 99285 EMERGENCY DEPT VISIT HI MDM: CPT | Mod: 25

## 2022-02-16 PROCEDURE — 83605 ASSAY OF LACTIC ACID: CPT | Performed by: EMERGENCY MEDICINE

## 2022-02-16 PROCEDURE — 83690 ASSAY OF LIPASE: CPT | Performed by: EMERGENCY MEDICINE

## 2022-02-16 PROCEDURE — 96366 THER/PROPH/DIAG IV INF ADDON: CPT

## 2022-02-16 PROCEDURE — C9803 HOPD COVID-19 SPEC COLLECT: HCPCS

## 2022-02-16 PROCEDURE — 80053 COMPREHEN METABOLIC PANEL: CPT | Performed by: EMERGENCY MEDICINE

## 2022-02-16 PROCEDURE — 36415 COLL VENOUS BLD VENIPUNCTURE: CPT | Performed by: EMERGENCY MEDICINE

## 2022-02-16 PROCEDURE — 84484 ASSAY OF TROPONIN QUANT: CPT | Performed by: EMERGENCY MEDICINE

## 2022-02-16 PROCEDURE — 250N000011 HC RX IP 250 OP 636: Performed by: EMERGENCY MEDICINE

## 2022-02-16 PROCEDURE — 85730 THROMBOPLASTIN TIME PARTIAL: CPT | Performed by: EMERGENCY MEDICINE

## 2022-02-16 PROCEDURE — 250N000013 HC RX MED GY IP 250 OP 250 PS 637: Performed by: EMERGENCY MEDICINE

## 2022-02-16 PROCEDURE — 99291 CRITICAL CARE FIRST HOUR: CPT | Mod: 25 | Performed by: EMERGENCY MEDICINE

## 2022-02-16 PROCEDURE — 83880 ASSAY OF NATRIURETIC PEPTIDE: CPT | Mod: GZ | Performed by: EMERGENCY MEDICINE

## 2022-02-16 PROCEDURE — 93308 TTE F-UP OR LMTD: CPT | Mod: 26 | Performed by: EMERGENCY MEDICINE

## 2022-02-16 PROCEDURE — 96376 TX/PRO/DX INJ SAME DRUG ADON: CPT

## 2022-02-16 PROCEDURE — 93308 TTE F-UP OR LMTD: CPT | Mod: TC

## 2022-02-16 PROCEDURE — 85610 PROTHROMBIN TIME: CPT | Performed by: EMERGENCY MEDICINE

## 2022-02-16 PROCEDURE — U0003 INFECTIOUS AGENT DETECTION BY NUCLEIC ACID (DNA OR RNA); SEVERE ACUTE RESPIRATORY SYNDROME CORONAVIRUS 2 (SARS-COV-2) (CORONAVIRUS DISEASE [COVID-19]), AMPLIFIED PROBE TECHNIQUE, MAKING USE OF HIGH THROUGHPUT TECHNOLOGIES AS DESCRIBED BY CMS-2020-01-R: HCPCS | Performed by: EMERGENCY MEDICINE

## 2022-02-16 PROCEDURE — 96365 THER/PROPH/DIAG IV INF INIT: CPT | Mod: XU

## 2022-02-16 PROCEDURE — 85025 COMPLETE CBC W/AUTO DIFF WBC: CPT | Performed by: EMERGENCY MEDICINE

## 2022-02-16 PROCEDURE — 93005 ELECTROCARDIOGRAM TRACING: CPT

## 2022-02-16 PROCEDURE — 93010 ELECTROCARDIOGRAM REPORT: CPT | Performed by: INTERNAL MEDICINE

## 2022-02-16 PROCEDURE — 71045 X-RAY EXAM CHEST 1 VIEW: CPT

## 2022-02-16 RX ORDER — ASPIRIN 81 MG/1
324 TABLET, CHEWABLE ORAL ONCE
Status: COMPLETED | OUTPATIENT
Start: 2022-02-16 | End: 2022-02-16

## 2022-02-16 RX ORDER — HEPARIN SODIUM 10000 [USP'U]/100ML
0-5000 INJECTION, SOLUTION INTRAVENOUS CONTINUOUS
Status: DISCONTINUED | OUTPATIENT
Start: 2022-02-16 | End: 2022-02-16 | Stop reason: HOSPADM

## 2022-02-16 RX ADMIN — ASPIRIN 81 MG CHEWABLE TABLET 324 MG: 81 TABLET CHEWABLE at 04:29

## 2022-02-16 RX ADMIN — HEPARIN SODIUM 650 UNITS/HR: 10000 INJECTION, SOLUTION INTRAVENOUS at 06:03

## 2022-02-16 ASSESSMENT — ENCOUNTER SYMPTOMS
FEVER: 0
COUGH: 0
SHORTNESS OF BREATH: 0
CHILLS: 0

## 2022-02-16 NOTE — ED NOTES
[facility] here to transport patient to [facility]. Bedside report given. Patient transferred self to EMS stretcher without issues. Personal belongings and transfer paperwork given to EMS. Left ED in care of [facility].

## 2022-02-16 NOTE — ED PROVIDER NOTES
History   No chief complaint on file.    HPI  Frances Gilliam is a 87 year old female who is here w/ chest pain. 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 Last night at 2230. Constant. Worse with lying flat. Moderate severity. Located to sternum. Does not radiate. No cough, sob, fever, chills, pain/swelling of LE.    Allergies:  Allergies   Allergen Reactions     Niacin Rash       Problem List:    Patient Active Problem List    Diagnosis Date Noted     Coronary atherosclerosis 12/21/2019     Priority: Medium     Disorder of bone and cartilage 12/21/2019     Priority: Medium     Hyperlipidemia 12/21/2019     Priority: Medium     Hypertension 12/21/2019     Priority: Medium     Macular degeneration 12/21/2019     Priority: Medium     Occlusion and stenosis of carotid artery without mention of cerebral infarction 12/21/2019     Priority: Medium     Overview:   with 50-75% stenoses of left, right internal carotid arteries, documented by   US 5/15/02 and 06/16/04       Closed fracture of multiple ribs of left side 12/21/2019     Priority: Medium     Syncope 12/21/2019     Priority: Medium     Multiple rib fractures 12/21/2019     Priority: Medium     Vitamin D deficiency      Priority: Medium     Vitamin B 12 deficiency      Priority: Medium     Peripheral vascular disease (H)      Priority: Medium     Iron deficiency anemia      Priority: Medium     History of MI (myocardial infarction)      Priority: Medium     GI hemorrhage      Priority: Medium     Essential thrombocytosis (H)      Priority: Medium     CAD (coronary artery disease)      Priority: Medium     Aortic valve sclerosis      Priority: Medium     Anemia 05/12/2016     Priority: Medium     Anxiety about health 11/04/2014     Priority: Medium     Panic disorder 11/04/2014     Priority: Medium     Asthma 10/11/2011     Priority: Medium     Lumbago 09/06/2006     Priority: Medium     IMO Update 10/11        Sacroiliitis, not elsewhere classified (H) 2006     Priority: Medium     IMO Update 10/11          Past Medical History:    Past Medical History:   Diagnosis Date     Age-related macular degeneration      Aortic valve sclerosis      CAD (coronary artery disease)      Essential thrombocytosis (H)      GI hemorrhage      History of MI (myocardial infarction)      Hypertension      Iron deficiency anemia      Mixed hyperlipidemia      Peripheral vascular disease (H)      Vitamin B 12 deficiency      Vitamin D deficiency        Past Surgical History:    Past Surgical History:   Procedure Laterality Date     ESOPHAGOSCOPY, GASTROSCOPY, DUODENOSCOPY (EGD), COMBINED N/A 2017    Procedure: COMBINED ESOPHAGOSCOPY, GASTROSCOPY, DUODENOSCOPY (EGD);  UPPER ENDOSCOPY WITH BIOPSIES, ENTEROSCOPY WITH  SMALL BOWEL ARGON PLASMA COAGULATION;  Surgeon: Robin Vaughn MD;  Location: [facility]     [place] PATIENT HAS A CORONARY ARTERY STENT         Family History:    Family History   Problem Relation Age of Onset     Heart Disease Father        Social History:  Marital Status:   [5]  Social History     Tobacco Use     Smoking status: Former Smoker     Types: Cigarettes     Quit date:      Years since quittin.1     Smokeless tobacco: Never Used   Substance Use Topics     Alcohol use: Yes     Comment: occasionally     Drug use: Never        Medications:    amLODIPine (NORVASC) 5 MG tablet  aspirin 81 MG EC tablet  atorvastatin (LIPITOR) 20 MG tablet  Calcium Carb-Cholecalciferol (CALCIUM PLUS VITAMIN D3 PO)  cyanocobalamin (VITAMIN B-12) 100 MCG tablet  ferrous sulfate (IRON) 325 (65 FE) MG tablet  fluticasone-vilanterol (BREO ELLIPTA) 100-25 MCG/INH oral inhaler  Multiple Vitamins-Minerals (ICAPS PO)  Nitroglycerin (NITROTAB SL)          Review of Systems   Constitutional: Negative for chills and fever.   Respiratory: Negative for cough and shortness of breath.    All other systems reviewed and are  negative.      Physical Exam   BP: 153/77  Pulse: 87  Resp: 21  Weight: 56.1 kg (123 lb 9.6 oz)  SpO2: 94 %      Physical Exam  Constitutional:       General: She is not in acute distress.     Appearance: She is not diaphoretic.   HENT:      Head: Normocephalic and atraumatic.      Right Ear: External ear normal.      Left Ear: External ear normal.      Nose: No congestion or rhinorrhea.      Mouth/Throat:      Pharynx: Oropharynx is clear. No oropharyngeal exudate.   Eyes:      General: No scleral icterus.     Pupils: Pupils are equal, round, and reactive to light.   Cardiovascular:      Rate and Rhythm: Normal rate and regular rhythm.      Heart sounds: Normal heart sounds.   Pulmonary:      Effort: No respiratory distress.      Breath sounds: Normal breath sounds.   Abdominal:      General: Bowel sounds are normal.      Palpations: Abdomen is soft.      Tenderness: There is no abdominal tenderness.   Musculoskeletal:         General: No tenderness.      Cervical back: Normal range of motion and neck supple.      Right lower leg: No edema.      Left lower leg: No edema.   Skin:     General: Skin is warm.      Capillary Refill: Capillary refill takes less than 2 seconds.      Findings: No rash.   Neurological:      Mental Status: Mental status is at baseline.      Cranial Nerves: No cranial nerve deficit.   Psychiatric:         Mood and Affect: Mood normal.         Behavior: Behavior normal.         ED Course                 Procedures    Results for orders placed during the hospital encounter of 02/16/22    POC US ECHO LIMITED    [facility] Procedure Note    Limited Bedside ED Cardiac Ultrasound:    PROCEDURE: PERFORMED BY: Dr. Callum Coleman MD  INDICATIONS/SYMPTOM:  Chest Pain  PROBE: Cardiac phased array probe  BODY LOCATION: Chest  FINDINGS:  The ultrasound was performed utilizing the subcostal and apical 4 chamber views.  Cardiac contractility:  Present  Gross estimation of cardiac kinesis:  normal  Pericardial Effusion:  None  INTERPRETATION:    Chamber size and motion were grossly normal with LV > RV, normal cardiac kinesis.  No pericardial effusion was found.  IVC visualized and findings indicate normovolemia.  IMAGE DOCUMENTATION: Images were archived to PACs system.            EKG Interpretation:      Interpreted by Callum Coleman MD  Time reviewed:   Symptoms at time of EKG: chest pain   Rhythm: 1 degree AV block  Rate: Normal  Axis: Left Axis Deviation  Ectopy: none  Conduction: left bundle branch block (complete)  ST Segments/ T Waves: No ST-T wave changes  Q Waves: none  Comparison to prior: new LBBB, does not appear to be pacer spikes    Clinical Impression: left bundle branch block                Critical Care time:  was 69 minutes for this patient excluding procedures.             Results for orders placed or performed during the hospital encounter of 02/16/22 (from the past 24 hour(s))   CBC with platelets differential    Narrative    The following orders were created for panel order CBC with platelets differential.  Procedure                               Abnormality         Status                     ---------                               -----------         ------                     CBC with platelets and d...[[id number]]                      Final result                 Please view results for these tests on the individual orders.   Comprehensive metabolic panel   Result Value Ref Range    Sodium 138 133 - 144 mmol/L    Potassium 3.7 3.4 - 5.3 mmol/L    Chloride 105 94 - 109 mmol/L    Carbon Dioxide (CO2) 27 20 - 32 mmol/L    Anion Gap 6 3 - 14 mmol/L    Urea Nitrogen 16 7 - 30 mg/dL    Creatinine 0.76 0.52 - 1.04 mg/dL    Calcium 9.3 8.5 - 10.1 mg/dL    Glucose 106 (H) 70 - 99 mg/dL    Alkaline Phosphatase 116 40 - 150 U/L    AST 18 0 - 45 U/L    ALT 24 0 - 50 U/L    Protein Total 6.8 6.8 - 8.8 g/dL    Albumin 3.2 (L) 3.4 - 5.0 g/dL    Bilirubin Total 0.5 0.2 - 1.3 mg/dL    GFR  Estimate 75 >60 mL/min/1.73m2   INR   Result Value Ref Range    INR 1.01 0.85 - 1.15   Partial thromboplastin time   Result Value Ref Range    aPTT 31 22 - 38 Seconds   Nt probnp inpatient   Result Value Ref Range    N terminal Pro BNP Inpatient 831 0-1,800 pg/mL   Lipase   Result Value Ref Range    Lipase 192 73 - 393 U/L   CBC with platelets and differential   Result Value Ref Range    WBC Count 6.6 4.0 - 11.0 10e3/uL    RBC Count 4.22 3.80 - 5.20 10e6/uL    Hemoglobin 12.9 11.7 - 15.7 g/dL    Hematocrit 39.6 35.0 - 47.0 %    MCV 94 78 - 100 fL    MCH 30.6 26.5 - 33.0 pg    MCHC 32.6 31.5 - 36.5 g/dL    RDW 12.8 10.0 - 15.0 %    Platelet Count 290 150 - 450 10e3/uL    % Neutrophils 67 %    % Lymphocytes 15 %    % Monocytes 11 %    % Eosinophils 5 %    % Basophils 1 %    % Immature Granulocytes 1 %    NRBCs per 100 WBC 0 <1 /100    Absolute Neutrophils 4.6 1.6 - 8.3 10e3/uL    Absolute Lymphocytes 1.0 0.8 - 5.3 10e3/uL    Absolute Monocytes 0.7 0.0 - 1.3 10e3/uL    Absolute Eosinophils 0.4 0.0 - 0.7 10e3/uL    Absolute Basophils 0.0 0.0 - 0.2 10e3/uL    Absolute Immature Granulocytes 0.0 <=0.4 10e3/uL    Absolute NRBCs 0.0 10e3/uL   Troponin I   Result Value Ref Range    Troponin I High Sensitivity 31 <54 ng/L   Lactic acid whole blood   Result Value Ref Range    Lactic Acid 0.6 (L) 0.7 - 2.0 mmol/L   Troponin I   Result Value Ref Range    Troponin I High Sensitivity 191 (HH) <54 ng/L   Asymptomatic COVID-19 Virus (Coronavirus) by PCR Nasopharyngeal    Specimen: Nasopharyngeal; Swab   Result Value Ref Range    SARS CoV2 PCR Negative Negative    Narrative    Testing was performed using the Xpert Xpress SARS-CoV-2 Assay on the   Cepheid Gene-Xpert Instrument Systems. Additional information about   this Emergency Use Authorization (EUA) assay can be found via the Lab   Guide. This test should be ordered for the detection of SARS-CoV-2 in   individuals who meet SARS-CoV-2 clinical and/or epidemiological   criteria.  Test performance is unknown in asymptomatic patients. This   test is for in vitro diagnostic use under the FDA EUA for   laboratories certified under CLIA to perform high complexity testing.   This test has not been FDA cleared or approved. A negative result   does not rule out the presence of PCR inhibitors in the specimen or   target RNA in concentration below the limit of detection for the   assay. The possibility of a false negative should be considered if   the patient's recent exposure or clinical presentation suggests   COVID-19. This test was validated by [facility] laboratory. This laboratory is certified under the Clinical Laboratory Improve  ment Amendments (CLIA) as qualified to perform high complexity testing.   POC US ECHO LIMITED    Impression    [facility] Procedure Note      Limited Bedside ED Cardiac Ultrasound:    PROCEDURE: PERFORMED BY: Dr. Callum Coleman MD  INDICATIONS/SYMPTOM:  Chest Pain  PROBE: Cardiac phased array probe  BODY LOCATION: Chest  FINDINGS:   The ultrasound was performed utilizing the subcostal and apical 4 chamber views.  Cardiac contractility:  Present  Gross estimation of cardiac kinesis: normal  Pericardial Effusion:  None  INTERPRETATION:    Chamber size and motion were grossly normal with LV > RV, normal cardiac kinesis.  No pericardial effusion was found.  IVC visualized and findings indicate normovolemia.  IMAGE DOCUMENTATION: Images were archived to PACs system.             Medications   heparin 25,000 units in 0.45% NaCl 250 mL ANTICOAGULANT infusion (650 Units/hr Intravenous New Bag 2/16/22 0603)   aspirin (ASA) chewable tablet 324 mg (324 mg Oral Given 2/16/22 0429)   heparin loading dose for LOW INTENSITY TREATMENT * Give BEFORE starting heparin infusion (3,350 Units Intravenous Given 2/16/22 0602)       Assessments & Plan (with Medical Decision Making)     I have reviewed the nursing notes.    I have reviewed the findings, diagnosis,  plan and need for follow up with the patient.  Critical Care Addendum    My initial assessment, based on my review of nursing observations, review of vital signs, focused history, physical exam and review of cardiac rhythm monitor, established that Frances Gilliam has a critical arrhythmia, which requires immediate intervention, and therefore she is critically ill.     After the initial assessment, the care team initiated multiple lab tests, initiated medication therapy with aspirin, heparin and performed bedside ultrasound to provide stabilization care. Due to the critical nature of this patient, I reassessed nursing observations, vital signs, physical exam, review of cardiac rhythm monitor, 12 lead ECG analysis, interpretation of bedside ultrasound, mental status, neurologic status and respiratory status multiple times prior to her disposition.     Time also spent performing documentation, discussion with family to obtain medical information for decision making, reviewing test results, discussion with consultants and coordination of care.     Critical care time (excluding teaching time and procedures): 69 minutes.     New Prescriptions    No medications on file       Final diagnoses:   NSTEMI (non-ST elevated myocardial infarction) (H)     88 yo f here w/ cp, 1st trop wnl but warranted delta, delta > 20 so rules in, accepted at [facility], minimal pain s/p asa and heparin    2/16/2022   [facility]     Callum Coleman MD  02/16/22 0635

## 2022-02-16 NOTE — ED NOTES
[facility] called with bed placement. Patient going to the cardiac unit 602-2 and number for report is [phone].

## 2022-04-12 ENCOUNTER — HOSPITAL ENCOUNTER (OUTPATIENT)
Dept: CARDIAC REHAB | Facility: HOSPITAL | Age: 87
Setting detail: THERAPIES SERIES
Discharge: HOME | End: 2022-04-12
Attending: INTERNAL MEDICINE
Payer: MEDICARE

## 2022-04-12 PROCEDURE — 93798 PHYS/QHP OP CAR RHAB W/ECG: CPT

## 2022-04-12 PROCEDURE — 999N000109 HC STATISTIC OP CR VISIT

## 2022-04-18 ENCOUNTER — HOSPITAL ENCOUNTER (OUTPATIENT)
Dept: CARDIAC REHAB | Facility: HOSPITAL | Age: 87
Setting detail: THERAPIES SERIES
Discharge: HOME | End: 2022-04-18
Attending: INTERNAL MEDICINE
Payer: MEDICARE

## 2022-04-18 PROCEDURE — 93798 PHYS/QHP OP CAR RHAB W/ECG: CPT

## 2022-04-18 PROCEDURE — 999N000109 HC STATISTIC OP CR VISIT

## 2022-04-20 ENCOUNTER — HOSPITAL ENCOUNTER (OUTPATIENT)
Dept: CARDIAC REHAB | Facility: HOSPITAL | Age: 87
Setting detail: THERAPIES SERIES
Discharge: HOME | End: 2022-04-20
Attending: INTERNAL MEDICINE
Payer: MEDICARE

## 2022-04-20 PROCEDURE — 999N000109 HC STATISTIC OP CR VISIT

## 2022-04-20 PROCEDURE — 93798 PHYS/QHP OP CAR RHAB W/ECG: CPT

## 2022-04-25 ENCOUNTER — HOSPITAL ENCOUNTER (OUTPATIENT)
Dept: CARDIAC REHAB | Facility: HOSPITAL | Age: 87
Setting detail: THERAPIES SERIES
Discharge: HOME | End: 2022-04-25
Attending: INTERNAL MEDICINE
Payer: MEDICARE

## 2022-04-25 PROCEDURE — 999N000109 HC STATISTIC OP CR VISIT

## 2022-04-25 PROCEDURE — 93798 PHYS/QHP OP CAR RHAB W/ECG: CPT

## 2022-04-27 ENCOUNTER — HOSPITAL ENCOUNTER (OUTPATIENT)
Dept: CARDIAC REHAB | Facility: HOSPITAL | Age: 87
Setting detail: THERAPIES SERIES
Discharge: HOME | End: 2022-04-27
Attending: INTERNAL MEDICINE
Payer: MEDICARE

## 2022-04-27 PROCEDURE — 999N000109 HC STATISTIC OP CR VISIT

## 2022-04-27 PROCEDURE — 93798 PHYS/QHP OP CAR RHAB W/ECG: CPT

## 2022-05-02 ENCOUNTER — HOSPITAL ENCOUNTER (OUTPATIENT)
Dept: CARDIAC REHAB | Facility: HOSPITAL | Age: 87
Setting detail: THERAPIES SERIES
Discharge: HOME | End: 2022-05-02
Attending: INTERNAL MEDICINE
Payer: MEDICARE

## 2022-05-02 PROCEDURE — 999N000109 HC STATISTIC OP CR VISIT

## 2022-05-02 PROCEDURE — 93798 PHYS/QHP OP CAR RHAB W/ECG: CPT

## 2022-05-04 ENCOUNTER — HOSPITAL ENCOUNTER (OUTPATIENT)
Dept: CARDIAC REHAB | Facility: HOSPITAL | Age: 87
Setting detail: THERAPIES SERIES
Discharge: HOME | End: 2022-05-04
Attending: INTERNAL MEDICINE
Payer: MEDICARE

## 2022-05-04 PROCEDURE — 999N000109 HC STATISTIC OP CR VISIT

## 2022-05-04 PROCEDURE — 93798 PHYS/QHP OP CAR RHAB W/ECG: CPT

## 2022-05-09 ENCOUNTER — HOSPITAL ENCOUNTER (OUTPATIENT)
Dept: CARDIAC REHAB | Facility: HOSPITAL | Age: 87
Setting detail: THERAPIES SERIES
Discharge: HOME | End: 2022-05-09
Attending: INTERNAL MEDICINE
Payer: MEDICARE

## 2022-05-09 PROCEDURE — 999N000109 HC STATISTIC OP CR VISIT

## 2022-05-09 PROCEDURE — 93798 PHYS/QHP OP CAR RHAB W/ECG: CPT

## 2022-05-11 ENCOUNTER — HOSPITAL ENCOUNTER (OUTPATIENT)
Dept: CARDIAC REHAB | Facility: HOSPITAL | Age: 87
Setting detail: THERAPIES SERIES
Discharge: HOME | End: 2022-05-11
Attending: INTERNAL MEDICINE
Payer: MEDICARE

## 2022-05-11 PROCEDURE — 93798 PHYS/QHP OP CAR RHAB W/ECG: CPT

## 2022-05-11 PROCEDURE — 999N000109 HC STATISTIC OP CR VISIT

## 2022-05-16 ENCOUNTER — HOSPITAL ENCOUNTER (OUTPATIENT)
Dept: CARDIAC REHAB | Facility: HOSPITAL | Age: 87
Setting detail: THERAPIES SERIES
Discharge: HOME | End: 2022-05-16
Attending: INTERNAL MEDICINE
Payer: MEDICARE

## 2022-05-16 PROCEDURE — 999N000109 HC STATISTIC OP CR VISIT

## 2022-05-16 PROCEDURE — 93798 PHYS/QHP OP CAR RHAB W/ECG: CPT

## 2022-05-18 ENCOUNTER — HOSPITAL ENCOUNTER (OUTPATIENT)
Dept: CARDIAC REHAB | Facility: HOSPITAL | Age: 87
Setting detail: THERAPIES SERIES
Discharge: HOME | End: 2022-05-18
Attending: INTERNAL MEDICINE
Payer: MEDICARE

## 2022-05-18 PROCEDURE — 999N000109 HC STATISTIC OP CR VISIT

## 2022-05-18 PROCEDURE — 93798 PHYS/QHP OP CAR RHAB W/ECG: CPT

## 2022-05-23 ENCOUNTER — HOSPITAL ENCOUNTER (OUTPATIENT)
Dept: CARDIAC REHAB | Facility: HOSPITAL | Age: 87
Setting detail: THERAPIES SERIES
Discharge: HOME | End: 2022-05-23
Attending: INTERNAL MEDICINE
Payer: MEDICARE

## 2022-05-23 PROCEDURE — 93798 PHYS/QHP OP CAR RHAB W/ECG: CPT

## 2022-05-23 PROCEDURE — 999N000109 HC STATISTIC OP CR VISIT

## 2022-05-25 ENCOUNTER — HOSPITAL ENCOUNTER (OUTPATIENT)
Dept: CARDIAC REHAB | Facility: HOSPITAL | Age: 87
Setting detail: THERAPIES SERIES
Discharge: HOME | End: 2022-05-25
Attending: INTERNAL MEDICINE
Payer: MEDICARE

## 2022-05-25 PROCEDURE — 999N000109 HC STATISTIC OP CR VISIT

## 2022-05-25 PROCEDURE — 93798 PHYS/QHP OP CAR RHAB W/ECG: CPT

## 2022-06-01 ENCOUNTER — HOSPITAL ENCOUNTER (OUTPATIENT)
Dept: CARDIAC REHAB | Facility: HOSPITAL | Age: 87
Setting detail: THERAPIES SERIES
Discharge: HOME | End: 2022-06-01
Attending: INTERNAL MEDICINE
Payer: MEDICARE

## 2022-06-01 PROCEDURE — 93798 PHYS/QHP OP CAR RHAB W/ECG: CPT

## 2022-06-01 PROCEDURE — 999N000109 HC STATISTIC OP CR VISIT

## 2022-06-06 ENCOUNTER — HOSPITAL ENCOUNTER (OUTPATIENT)
Dept: CARDIAC REHAB | Facility: HOSPITAL | Age: 87
Setting detail: THERAPIES SERIES
Discharge: HOME | End: 2022-06-06
Attending: INTERNAL MEDICINE
Payer: MEDICARE

## 2022-06-06 PROCEDURE — 999N000109 HC STATISTIC OP CR VISIT

## 2022-06-06 PROCEDURE — 93798 PHYS/QHP OP CAR RHAB W/ECG: CPT

## 2022-06-13 ENCOUNTER — HOSPITAL ENCOUNTER (OUTPATIENT)
Dept: CARDIAC REHAB | Facility: HOSPITAL | Age: 87
Setting detail: THERAPIES SERIES
Discharge: HOME | End: 2022-06-13
Attending: INTERNAL MEDICINE
Payer: MEDICARE

## 2022-06-13 PROCEDURE — 999N000109 HC STATISTIC OP CR VISIT

## 2022-06-13 PROCEDURE — 93798 PHYS/QHP OP CAR RHAB W/ECG: CPT

## 2022-11-25 ENCOUNTER — APPOINTMENT (OUTPATIENT)
Dept: GENERAL RADIOLOGY | Facility: HOSPITAL | Age: 87
End: 2022-11-25
Attending: PHYSICIAN ASSISTANT
Payer: MEDICARE

## 2022-11-25 ENCOUNTER — APPOINTMENT (OUTPATIENT)
Dept: CT IMAGING | Facility: HOSPITAL | Age: 87
End: 2022-11-25
Attending: PHYSICIAN ASSISTANT
Payer: MEDICARE

## 2022-11-25 ENCOUNTER — HOSPITAL ENCOUNTER (OUTPATIENT)
Facility: HOSPITAL | Age: 87
Setting detail: OBSERVATION
LOS: 1 days | Discharge: HOME | End: 2022-11-27
Attending: FAMILY MEDICINE | Admitting: INTERNAL MEDICINE
Payer: MEDICARE

## 2022-11-25 DIAGNOSIS — J96.01 ACUTE RESPIRATORY FAILURE WITH HYPOXIA (H): ICD-10-CM

## 2022-11-25 DIAGNOSIS — I27.20 PULMONARY HYPERTENSION (H): Primary | ICD-10-CM

## 2022-11-25 DIAGNOSIS — J44.9 CHRONIC OBSTRUCTIVE PULMONARY DISEASE, UNSPECIFIED COPD TYPE (H): ICD-10-CM

## 2022-11-25 DIAGNOSIS — M62.81 GENERALIZED MUSCLE WEAKNESS: ICD-10-CM

## 2022-11-25 DIAGNOSIS — R09.02 HYPOXIA: ICD-10-CM

## 2022-11-25 PROBLEM — E78.5 HYPERLIPIDEMIA: Status: ACTIVE | Noted: 2019-12-21

## 2022-11-25 PROBLEM — E78.49 OTHER HYPERLIPIDEMIA: Status: ACTIVE | Noted: 2020-02-04

## 2022-11-25 PROBLEM — Z95.0 PACEMAKER: Status: ACTIVE | Noted: 2021-02-16

## 2022-11-25 PROBLEM — I65.23 BILATERAL CAROTID ARTERY STENOSIS: Status: ACTIVE | Noted: 2020-02-04

## 2022-11-25 PROBLEM — I25.118 CORONARY ARTERY DISEASE OF NATIVE ARTERY OF NATIVE HEART WITH STABLE ANGINA PECTORIS (H): Status: ACTIVE | Noted: 2020-02-04

## 2022-11-25 PROBLEM — I44.7 LBBB (LEFT BUNDLE BRANCH BLOCK): Status: ACTIVE | Noted: 2022-11-25

## 2022-11-25 PROBLEM — I71.43 ANEURYSM OF INFRARENAL ABDOMINAL AORTA (H): Status: ACTIVE | Noted: 2021-02-13

## 2022-11-25 PROBLEM — I10 HYPERTENSION: Status: ACTIVE | Noted: 2019-12-21

## 2022-11-25 PROBLEM — I10 ESSENTIAL HYPERTENSION: Status: ACTIVE | Noted: 2020-02-04

## 2022-11-25 LAB
ALBUMIN UR-MCNC: 30 MG/DL
ANION GAP SERPL CALCULATED.3IONS-SCNC: 10 MMOL/L (ref 7–15)
APPEARANCE UR: CLEAR
BACTERIA #/AREA URNS HPF: ABNORMAL /HPF
BASOPHILS # BLD AUTO: 0 10E3/UL (ref 0–0.2)
BASOPHILS NFR BLD AUTO: 1 %
BILIRUB UR QL STRIP: NEGATIVE
BUN SERPL-MCNC: 17.6 MG/DL (ref 8–23)
CALCIUM SERPL-MCNC: 9.1 MG/DL (ref 8.8–10.2)
CHLORIDE SERPL-SCNC: 103 MMOL/L (ref 98–107)
COLOR UR AUTO: ABNORMAL
CREAT SERPL-MCNC: 0.77 MG/DL (ref 0.51–0.95)
D DIMER PPP FEU-MCNC: 1.72 UG/ML FEU (ref 0–0.5)
DEPRECATED HCO3 PLAS-SCNC: 25 MMOL/L (ref 22–29)
EOSINOPHIL # BLD AUTO: 0.3 10E3/UL (ref 0–0.7)
EOSINOPHIL NFR BLD AUTO: 5 %
ERYTHROCYTE [DISTWIDTH] IN BLOOD BY AUTOMATED COUNT: 15.5 % (ref 10–15)
FLUAV RNA SPEC QL NAA+PROBE: NEGATIVE
FLUBV RNA RESP QL NAA+PROBE: NEGATIVE
GFR SERPL CREATININE-BSD FRML MDRD: 74 ML/MIN/1.73M2
GLUCOSE SERPL-MCNC: 99 MG/DL (ref 70–99)
GLUCOSE UR STRIP-MCNC: NEGATIVE MG/DL
HCT VFR BLD AUTO: 36.6 % (ref 35–47)
HGB BLD-MCNC: 11.5 G/DL (ref 11.7–15.7)
HGB UR QL STRIP: NEGATIVE
HOLD SPECIMEN: NORMAL
HYALINE CASTS: 1 /LPF
IMM GRANULOCYTES # BLD: 0 10E3/UL
IMM GRANULOCYTES NFR BLD: 0 %
KETONES UR STRIP-MCNC: NEGATIVE MG/DL
LEUKOCYTE ESTERASE UR QL STRIP: ABNORMAL
LYMPHOCYTES # BLD AUTO: 1 10E3/UL (ref 0.8–5.3)
LYMPHOCYTES NFR BLD AUTO: 16 %
MCH RBC QN AUTO: 29.5 PG (ref 26.5–33)
MCHC RBC AUTO-ENTMCNC: 31.4 G/DL (ref 31.5–36.5)
MCV RBC AUTO: 94 FL (ref 78–100)
MONOCYTES # BLD AUTO: 0.5 10E3/UL (ref 0–1.3)
MONOCYTES NFR BLD AUTO: 8 %
MUCOUS THREADS #/AREA URNS LPF: PRESENT /LPF
NEUTROPHILS # BLD AUTO: 4.3 10E3/UL (ref 1.6–8.3)
NEUTROPHILS NFR BLD AUTO: 70 %
NITRATE UR QL: NEGATIVE
NRBC # BLD AUTO: 0 10E3/UL
NRBC BLD AUTO-RTO: 0 /100
NT-PROBNP SERPL-MCNC: 716 PG/ML (ref 0–1800)
PH UR STRIP: 6.5 [PH] (ref 4.7–8)
PLATELET # BLD AUTO: 272 10E3/UL (ref 150–450)
POTASSIUM SERPL-SCNC: 4.1 MMOL/L (ref 3.4–5.3)
RBC # BLD AUTO: 3.9 10E6/UL (ref 3.8–5.2)
RBC URINE: 3 /HPF
RSV RNA SPEC NAA+PROBE: NEGATIVE
SARS-COV-2 RNA RESP QL NAA+PROBE: NEGATIVE
SODIUM SERPL-SCNC: 138 MMOL/L (ref 136–145)
SP GR UR STRIP: 1.01 (ref 1–1.03)
SQUAMOUS EPITHELIAL: 4 /HPF
TROPONIN T SERPL HS-MCNC: 58 NG/L
TROPONIN T SERPL HS-MCNC: 58 NG/L
TROPONIN T SERPL HS-MCNC: 63 NG/L
TSH SERPL DL<=0.005 MIU/L-ACNC: 2.52 UIU/ML (ref 0.3–4.2)
UROBILINOGEN UR STRIP-MCNC: NORMAL MG/DL
WBC # BLD AUTO: 6.1 10E3/UL (ref 4–11)
WBC URINE: 5 /HPF

## 2022-11-25 PROCEDURE — 99285 EMERGENCY DEPT VISIT HI MDM: CPT | Performed by: PHYSICIAN ASSISTANT

## 2022-11-25 PROCEDURE — 85379 FIBRIN DEGRADATION QUANT: CPT | Performed by: PHYSICIAN ASSISTANT

## 2022-11-25 PROCEDURE — 80048 BASIC METABOLIC PNL TOTAL CA: CPT | Performed by: FAMILY MEDICINE

## 2022-11-25 PROCEDURE — 36415 COLL VENOUS BLD VENIPUNCTURE: CPT | Performed by: FAMILY MEDICINE

## 2022-11-25 PROCEDURE — 250N000012 HC RX MED GY IP 250 OP 636 PS 637: Performed by: HOSPITALIST

## 2022-11-25 PROCEDURE — 99285 EMERGENCY DEPT VISIT HI MDM: CPT | Mod: 25

## 2022-11-25 PROCEDURE — 250N000011 HC RX IP 250 OP 636: Performed by: PHYSICIAN ASSISTANT

## 2022-11-25 PROCEDURE — 87637 SARSCOV2&INF A&B&RSV AMP PRB: CPT | Performed by: PHYSICIAN ASSISTANT

## 2022-11-25 PROCEDURE — 81001 URINALYSIS AUTO W/SCOPE: CPT | Performed by: FAMILY MEDICINE

## 2022-11-25 PROCEDURE — 84484 ASSAY OF TROPONIN QUANT: CPT | Mod: 91 | Performed by: HOSPITALIST

## 2022-11-25 PROCEDURE — 93010 ELECTROCARDIOGRAM REPORT: CPT | Performed by: INTERNAL MEDICINE

## 2022-11-25 PROCEDURE — 93005 ELECTROCARDIOGRAM TRACING: CPT

## 2022-11-25 PROCEDURE — 87086 URINE CULTURE/COLONY COUNT: CPT | Mod: GZ | Performed by: FAMILY MEDICINE

## 2022-11-25 PROCEDURE — 71045 X-RAY EXAM CHEST 1 VIEW: CPT

## 2022-11-25 PROCEDURE — 84443 ASSAY THYROID STIM HORMONE: CPT | Performed by: PHYSICIAN ASSISTANT

## 2022-11-25 PROCEDURE — 83880 ASSAY OF NATRIURETIC PEPTIDE: CPT | Performed by: PHYSICIAN ASSISTANT

## 2022-11-25 PROCEDURE — 36415 COLL VENOUS BLD VENIPUNCTURE: CPT | Performed by: PHYSICIAN ASSISTANT

## 2022-11-25 PROCEDURE — 85025 COMPLETE CBC W/AUTO DIFF WBC: CPT | Performed by: FAMILY MEDICINE

## 2022-11-25 PROCEDURE — 250N000013 HC RX MED GY IP 250 OP 250 PS 637: Performed by: PHYSICIAN ASSISTANT

## 2022-11-25 PROCEDURE — 94640 AIRWAY INHALATION TREATMENT: CPT

## 2022-11-25 PROCEDURE — 36415 COLL VENOUS BLD VENIPUNCTURE: CPT | Performed by: HOSPITALIST

## 2022-11-25 PROCEDURE — 99220 PR INITIAL OBSERVATION CARE,LEVEL III: CPT | Mod: AI | Performed by: HOSPITALIST

## 2022-11-25 PROCEDURE — G0378 HOSPITAL OBSERVATION PER HR: HCPCS

## 2022-11-25 PROCEDURE — 84484 ASSAY OF TROPONIN QUANT: CPT | Performed by: FAMILY MEDICINE

## 2022-11-25 PROCEDURE — 250N000009 HC RX 250: Performed by: PHYSICIAN ASSISTANT

## 2022-11-25 PROCEDURE — 250N000013 HC RX MED GY IP 250 OP 250 PS 637: Performed by: HOSPITALIST

## 2022-11-25 PROCEDURE — 84484 ASSAY OF TROPONIN QUANT: CPT | Performed by: PHYSICIAN ASSISTANT

## 2022-11-25 PROCEDURE — C9803 HOPD COVID-19 SPEC COLLECT: HCPCS

## 2022-11-25 PROCEDURE — 71275 CT ANGIOGRAPHY CHEST: CPT | Mod: MG

## 2022-11-25 RX ORDER — IPRATROPIUM BROMIDE AND ALBUTEROL SULFATE 2.5; .5 MG/3ML; MG/3ML
3 SOLUTION RESPIRATORY (INHALATION)
Status: DISCONTINUED | OUTPATIENT
Start: 2022-11-25 | End: 2022-11-27 | Stop reason: HOSPADM

## 2022-11-25 RX ORDER — NITROGLYCERIN 0.4 MG/1
0.4 TABLET SUBLINGUAL EVERY 5 MIN PRN
Status: DISCONTINUED | OUTPATIENT
Start: 2022-11-25 | End: 2022-11-27 | Stop reason: HOSPADM

## 2022-11-25 RX ORDER — IPRATROPIUM BROMIDE AND ALBUTEROL SULFATE 2.5; .5 MG/3ML; MG/3ML
3 SOLUTION RESPIRATORY (INHALATION) ONCE
Status: COMPLETED | OUTPATIENT
Start: 2022-11-25 | End: 2022-11-25

## 2022-11-25 RX ORDER — ISOSORBIDE MONONITRATE 30 MG/1
30 TABLET, EXTENDED RELEASE ORAL EVERY MORNING
COMMUNITY
Start: 2022-11-07

## 2022-11-25 RX ORDER — ACETAMINOPHEN 325 MG/1
650 TABLET ORAL EVERY 4 HOURS PRN
Status: DISCONTINUED | OUTPATIENT
Start: 2022-11-25 | End: 2022-11-27 | Stop reason: HOSPADM

## 2022-11-25 RX ORDER — PREDNISONE 20 MG/1
40 TABLET ORAL DAILY
Status: DISCONTINUED | OUTPATIENT
Start: 2022-11-25 | End: 2022-11-27 | Stop reason: HOSPADM

## 2022-11-25 RX ORDER — CLOPIDOGREL BISULFATE 75 MG/1
75 TABLET ORAL EVERY MORNING
COMMUNITY
Start: 2022-11-06

## 2022-11-25 RX ORDER — ATORVASTATIN CALCIUM 10 MG/1
20 TABLET, FILM COATED ORAL DAILY
Status: DISCONTINUED | OUTPATIENT
Start: 2022-11-25 | End: 2022-11-27 | Stop reason: HOSPADM

## 2022-11-25 RX ORDER — AMLODIPINE BESYLATE 5 MG/1
5 TABLET ORAL DAILY
Status: DISCONTINUED | OUTPATIENT
Start: 2022-11-25 | End: 2022-11-27 | Stop reason: HOSPADM

## 2022-11-25 RX ORDER — IOPAMIDOL 755 MG/ML
45 INJECTION, SOLUTION INTRAVASCULAR ONCE
Status: COMPLETED | OUTPATIENT
Start: 2022-11-25 | End: 2022-11-25

## 2022-11-25 RX ORDER — ASPIRIN 81 MG/1
324 TABLET, CHEWABLE ORAL DAILY
Status: DISCONTINUED | OUTPATIENT
Start: 2022-11-25 | End: 2022-11-25 | Stop reason: DRUGHIGH

## 2022-11-25 RX ORDER — METOPROLOL SUCCINATE 25 MG/1
25 TABLET, EXTENDED RELEASE ORAL EVERY MORNING
COMMUNITY
Start: 2022-09-06

## 2022-11-25 RX ORDER — ALBUTEROL SULFATE 0.83 MG/ML
3 SOLUTION RESPIRATORY (INHALATION)
Status: DISCONTINUED | OUTPATIENT
Start: 2022-11-25 | End: 2022-11-27 | Stop reason: HOSPADM

## 2022-11-25 RX ORDER — ASPIRIN 81 MG/1
81 TABLET ORAL DAILY
Status: DISCONTINUED | OUTPATIENT
Start: 2022-11-25 | End: 2022-11-27 | Stop reason: HOSPADM

## 2022-11-25 RX ORDER — ACETAMINOPHEN 650 MG/1
650 SUPPOSITORY RECTAL EVERY 4 HOURS PRN
Status: DISCONTINUED | OUTPATIENT
Start: 2022-11-25 | End: 2022-11-27 | Stop reason: HOSPADM

## 2022-11-25 RX ORDER — AMLODIPINE BESYLATE 10 MG/1
10 TABLET ORAL EVERY MORNING
COMMUNITY
Start: 2022-11-16

## 2022-11-25 RX ORDER — FERROUS SULFATE 325(65) MG
325 TABLET ORAL
Status: DISCONTINUED | OUTPATIENT
Start: 2022-11-25 | End: 2022-11-27 | Stop reason: HOSPADM

## 2022-11-25 RX ORDER — LIDOCAINE 40 MG/G
CREAM TOPICAL
Status: DISCONTINUED | OUTPATIENT
Start: 2022-11-25 | End: 2022-11-27 | Stop reason: HOSPADM

## 2022-11-25 RX ADMIN — ASPIRIN 81 MG 324 MG: 81 TABLET ORAL at 11:26

## 2022-11-25 RX ADMIN — ASPIRIN 81 MG: 81 TABLET, COATED ORAL at 18:03

## 2022-11-25 RX ADMIN — AMLODIPINE BESYLATE 5 MG: 5 TABLET ORAL at 18:02

## 2022-11-25 RX ADMIN — PREDNISONE 40 MG: 20 TABLET ORAL at 18:03

## 2022-11-25 RX ADMIN — ATORVASTATIN CALCIUM 20 MG: 10 TABLET, FILM COATED ORAL at 18:02

## 2022-11-25 RX ADMIN — FERROUS SULFATE TAB 325 MG (65 MG ELEMENTAL FE) 325 MG: 325 (65 FE) TAB at 18:02

## 2022-11-25 RX ADMIN — IPRATROPIUM BROMIDE AND ALBUTEROL SULFATE 3 ML: 2.5; .5 SOLUTION RESPIRATORY (INHALATION) at 14:02

## 2022-11-25 RX ADMIN — IOPAMIDOL 45 ML: 755 INJECTION, SOLUTION INTRAVENOUS at 12:36

## 2022-11-25 ASSESSMENT — ENCOUNTER SYMPTOMS
MUSCULOSKELETAL NEGATIVE: 1
CONSTITUTIONAL NEGATIVE: 1
PSYCHIATRIC NEGATIVE: 1
GASTROINTESTINAL NEGATIVE: 1
CARDIOVASCULAR NEGATIVE: 1
RESPIRATORY NEGATIVE: 1
HEMATOLOGIC/LYMPHATIC NEGATIVE: 1
WEAKNESS: 1

## 2022-11-25 ASSESSMENT — ACTIVITIES OF DAILY LIVING (ADL)
ADLS_ACUITY_SCORE: 20
ADLS_ACUITY_SCORE: 35
ADLS_ACUITY_SCORE: 20
ADLS_ACUITY_SCORE: 35
ADLS_ACUITY_SCORE: 35
ADLS_ACUITY_SCORE: 20
ADLS_ACUITY_SCORE: 35

## 2022-11-25 NOTE — ED TRIAGE NOTES
"\"Here for dizziness since 0600, both legs are weak.  I also used to wear O2 at home.  Off O2 for over a year.\"  Thomas hand grasps strong.  No drifting of extremities.  Speech clear.  No facial droop.        "

## 2022-11-25 NOTE — CARE PLAN
"Pt's meds in med list verified with pt but pt states \"we are missing a couple and I can't remember what they are\". Writer attempted to call pt's pharmacy with no answer. Writer asked about meds in reconcile tab, added those she thought she takes. Med rec in progress.    Pt states daughter will bring in med list.  "

## 2022-11-25 NOTE — ED NOTES
Got patient up to [facility].  Once back in bed 02 sats 66%.  Now 96 on 2 l nc.  Provider updated

## 2022-11-25 NOTE — H&P
[facility]    History and Physical - Hospitalist Service       Date of Admission:  11/25/2022    Assessment & Plan      Frances Gilliam is a 88 year old female admitted on 11/25/2022.  Patient has a history of prior MI, panic disorder, coronary artery disease, pulmonary hypertension, emphysema.  She did have a prior pulmonary function test done in 2011 with a mention of FEV1 of 40% and improved with albuterol.  Patient also did have noted a PFT done in 2019 FEV1 was was 0.55 and 0.56 pre and post.  Patient lives at home alone and uses a walker, she presented emergency room with weakness and apparently when he was getting up to go to the bathroom she had hypoxia sats were in the 60s he required 2 L of oxygen.  Patient did have EKG done showing normal sinus rhythm left bundle branch block, troponin was mildly elevated 58 but was downtrending.  Her UA was suggestive of UTI, urine cultures were pending.  D-dimer was elevated, she did have CTA of her chest done no pulmonary embolism, she did have enlarged pulmonary artery which could suggest pulmonary hypertension. Influenza and COVID was negative.  Patient was admitted for observation possible echocardiogram PT and OT.       History of MI (myocardial infarction) ()    CAD (coronary artery disease) ()  Status  post pacemaker (2/16/2021)    Pulmonary hypertension (H) (11/25/2022)    Hypoxia (11/25/2022)    LBBB (left bundle branch block) (11/25/2022)  Chronic diastolic CHF  Trop elevation  Home asa  Wean off oxygen  Tele  Echo  Will treat for possible asthma exacerbation   prednisone and duo nebs ordered  Home breo inhaler   Repeat trop 6 pm     We will check BNP    Generalized muscle weakness (11/25/2022)  Pt and ot      Hyperlipidemia (12/21/2019)  Home statin      Hypertension (12/21/2019)  will restart home meds once med rec is completed      Peptic ulcer disease (11/12/2014)  PPI          Diet: Regular Diet Adult    DVT Prophylaxis: Enoxaparin (Lovenox)  SQ  Campos Catheter: Not present  Central Lines: None  Cardiac Monitoring: None  Code Status:   Full Code     Clinically Significant Risk Factors Present on Admission                    # Acute Respiratory Failure: Documented O2 saturation < 91%.  Continue supplemental oxygen as needed            Disposition Plan    Pending clinical improvement      The patient's care was discussed with the Bedside Nurse and Patient.    Mike Rocha DO  Hospitalist Service  [facility]  Securely message with the Vocera Web Console (learn more here)  Text page via [employer] Paging/Directory         ______________________________________________________________________    Chief Complaint   hypoxia    History is obtained from the patient    History of Present Illness   Frances Gilliam is a 88 year old female who is past medical history of asthma, coronary artery disease, hypertension, anxiety.  Patient lives at home alone uses a walker for mobility.  Presented emergency room with weakness and was found to have hypoxia.  Patient sats dropped to 60s and she required 3 L of oxygen.  She did have extensive work-up in the emergency room including troponins which were flat but mildly elevated, and her D-dimer was elevated she had CT of the chest done no pulmonary embolism but she did have a large pulmonary artery.  She has had prior echocardiogram showing diastolic heart failure.  Patient did have EKG done she was not paced at this time, showed left bundle branch block she has had prior.  She was feeling better with oxygen the concern was regarding her hypoxia which was new.  She was given DuoNeb without much response.  She does have a history of FEV1 of 40% in 2011 that improved with bronchodilators.  Patient wants to be  full code.    Review of Systems    The 10 point Review of Systems is negative other than noted in the HPI or here.      Past Medical History    I have reviewed this patient's medical history and updated it with  pertinent information if needed.   Past Medical History:   Diagnosis Date     Age-related macular degeneration      Aortic valve sclerosis      CAD (coronary artery disease)      Essential thrombocytosis (H)      GI hemorrhage      History of MI (myocardial infarction)      Hypertension      Iron deficiency anemia      Mixed hyperlipidemia      Peripheral vascular disease (H)      Vitamin B 12 deficiency      Vitamin D deficiency        Past Surgical History   I have reviewed this patient's surgical history and updated it with pertinent information if needed.  Past Surgical History:   Procedure Laterality Date     ESOPHAGOSCOPY, GASTROSCOPY, DUODENOSCOPY (EGD), COMBINED N/A 2017    Procedure: COMBINED ESOPHAGOSCOPY, GASTROSCOPY, DUODENOSCOPY (EGD);  UPPER ENDOSCOPY WITH BIOPSIES, ENTEROSCOPY WITH  SMALL BOWEL ARGON PLASMA COAGULATION;  Surgeon: Robin Vaughn MD;  Location: [facility] OR     MD PATIENT HAS A CORONARY ARTERY STENT         Social History   I have reviewed this patient's social history and updated it with pertinent information if needed.  Social History     Tobacco Use     Smoking status: Former     Types: Cigarettes     Quit date:      Years since quittin.9     Smokeless tobacco: Never   Substance Use Topics     Alcohol use: Yes     Comment: occasionally     Drug use: Never       Family History   I have reviewed this patient's family history and updated it with pertinent information if needed.  Family History   Problem Relation Age of Onset     Heart Disease Father        Prior to Admission Medications   Prior to Admission Medications   Prescriptions Last Dose Informant Patient Reported? Taking?   Calcium Carb-Cholecalciferol (CALCIUM PLUS VITAMIN D3 PO)   Yes Yes   Sig: Take 1 tablet by mouth 2 times daily   Multiple Vitamins-Minerals (ICAPS PO)   Yes Yes   Sig: Take 1 capsule by mouth daily   Nitroglycerin (NITROTAB SL)   Yes Yes   Sig: Place 0.4 mg under the tongue every 5 minutes as needed  for chest pain   amLODIPine (NORVASC) 5 MG tablet   Yes Yes   Sig: Take 5 mg by mouth daily    aspirin 81 MG EC tablet   Yes Yes   Sig: Take 81 mg by mouth daily   atorvastatin (LIPITOR) 20 MG tablet   Yes Yes   Sig: Take 20 mg by mouth daily    cyanocobalamin (VITAMIN B-12) 100 MCG tablet   Yes Yes   Sig: Take 1 tablet by mouth daily as directed.   ferrous sulfate (IRON) 325 (65 FE) MG tablet   Yes Yes   Sig: Take 325 mg by mouth daily (with breakfast)   fluticasone-vilanterol (BREO ELLIPTA) 100-25 MCG/INH inhaler   Yes Yes   Sig: Inhale 1 puff into the lungs daily      Facility-Administered Medications: None     Allergies   Allergies   Allergen Reactions     Niacin Rash       Physical Exam   Vital Signs: Temp: 97.6  F (36.4  C) Temp src: Oral BP: (!) 147/127 Pulse: 78   Resp: 19 SpO2: 100 % O2 Device: None (Room air)    Weight: 111 lbs 0 oz    Physical Exam  Constitutional:       Comments: Frail appearing stated age female    HENT:      Right Ear: External ear normal.      Left Ear: External ear normal.      Mouth/Throat:      Pharynx: Oropharynx is clear.   Cardiovascular:      Rate and Rhythm: Normal rate.   Pulmonary:      Effort: Pulmonary effort is normal.   Abdominal:      General: Abdomen is flat.   Musculoskeletal:         General: No swelling.   Skin:     Coloration: Skin is not jaundiced.   Neurological:      Mental Status: She is alert. Mental status is at baseline.         Data   Data reviewed today: I reviewed all medications, new labs and imaging results over the last 24 hours. I personally reviewed     Recent Labs   Lab 11/25/22  1019   WBC 6.1   HGB 11.5*   MCV 94         POTASSIUM 4.1   CHLORIDE 103   CO2 25   BUN 17.6   CR 0.77   ANIONGAP 10   CATARINA 9.1   GLC 99     Most Recent 3 CBC's:Recent Labs   Lab Test 11/25/22  1019 02/16/22  0305 02/13/21  1346   WBC 6.1 6.6 7.2   HGB 11.5* 12.9 13.1   MCV 94 94 97    290 171     Most Recent 3 BMP's:Recent Labs   Lab Test 11/25/22  1019  02/16/22  0305 02/13/21  1346    138 140   POTASSIUM 4.1 3.7 3.9   CHLORIDE 103 105 106   CO2 25 27 30   BUN 17.6 16 26   CR 0.77 0.76 0.93   ANIONGAP 10 6 4   CATARINA 9.1 9.3 9.2   GLC 99 106* 137*     6.1    \    11.5 (L)    /    272   N 70    L N/A    138    103    17.6 /   ------------------------------------ 99   ALT N/A   AST N/A   AP N/A   ALB N/A   Ca 9.1  4.1    25    0.77 \    % RETIC N/A    LDH N/A  Troponin N/A    BNP N/A    CK N/A  INR N/A   PTT N/A    D-dimer 1.72 (H)    Fibrinogen N/A    Antithrombin N/A  Ferritin N/A  CRP N/A    IL-6 N/A  Recent Results (from the past 24 hour(s))   XR Chest Port 1 View    Narrative    Procedure:XR CHEST PORT 1 VIEW    Clinical history:Female, 88 years, weakness    Technique: Single view was obtained.    Comparison: 2/16/2022    Findings: The cardiac silhouette is within normal limits. The  pulmonary vasculature is normal.    The lungs hyperinflated and demonstrate linear areas of atelectasis in  the lung bases. Bony structures demonstrate severe degenerative  changes of the shoulders and AC joints, similar when compared to prior  study.      Impression    Impression:   Persistent hyperinflation of the lungs with mild bibasilar  atelectasis.    BEHZAD ANTHONY MD         SYSTEM ID:  [id number]   CTA Chest with Contrast    Narrative    Exam:  CTA CHEST WITH CONTRAST    Exam reason:  elevated d  dimer, hypoxia    Technique:  Contrast enhanced helical CT pulmonary angiography was  performed. Sagittal and coronal reformats as well as coronal MIP  reformats were obtained. This CT was performed using one or more of  the following dose reduction techniques: automated exposure control,  adjustment of the mA and/or kV according to patient size, and/or use  of iterative reconstruction technique.    Meds/Contrast: Isovue-370, 45 mL    Comparison:  11/25/2022, 2/13/2021, 12/21/2019     FINDINGS:    Technical quality: Diagnostic.    Cardiovascular:   -There are no filling  defects in the pulmonary arteries that would  indicate pulmonary embolism.  -The main pulmonary artery is enlarged, likely due to pulmonary  hypertension.  -There is a left chest pacemaker generator with leads in the right  atrium and right ventricle. Lungs/Airways: Moderate centrilobular  emphysema. There are a few stable pulmonary nodules. These include a 5  mm nodule in the right middle lobe (series 5 image 69), a 4 mm  subpleural nodule in the right middle lobe (series 5 image 82), a 3 mm  subpleural nodule in the right lower lobe (series 5 image 88), and a 3  mm nodule in the right middle lobe (series 5 image 98). There is mild  bilateral interstitial thickening around the periphery of the lungs.  Cristy/Mediastinum: No mass or adenopathy.  Pleura: There is a small right and a trace left pleural effusion,  similar to the prior exam.  Chest Wall/Axilla: Unremarkable.    There is a large right thyroid nodule, similar to 2021.    Visualized Upper Abdomen: No acute findings. There are a couple of  calcified gallstones in the dependent gallbladder without findings to  suggest acute cholecystitis. Ectasia of the infra renal abdominal  aorta is again noted.  Musculoskeletal: No acute osseous abnormalities. There are sequela of  remote left rib fractures.      Impression    IMPRESSION:    No pulmonary embolism.    The main pulmonary artery is enlarged, likely due to pulmonary  hypertension.    There are a couple of stable small pulmonary nodules.    Small bilateral pleural effusions, similar to 2021.    ROSIE RONDON MD         SYSTEM ID:  [id number]

## 2022-11-25 NOTE — ED NOTES
Face to face report given with opportunity to observe patient.    Report given to Jojo LAANIZ RN   11/25/2022  11:19 AM

## 2022-11-25 NOTE — ED NOTES
Pt was on 2L of O2, took it off briefly to change her into gown and desatted to 83% while on RA, placed back on O2.  Pt was dyspneic while changed and she did state that happens sometimes.  Does have pacemaker/defib unknown of what rate she Is set at.  Pt stated they called on Saturday and stated everything is good

## 2022-11-25 NOTE — CARE PLAN
[facility] Inpatient Admission Note:    Patient admitted to 3216/3216-1 at approximately 1655 via wheel chair accompanied by transport tech from emergency room . Report received from ALYSSA Uribe in SBAR format at 1630 via telephone. Patient ambulated to bed via self.. Patient is alert and oriented X 3, denies pain; rates at 0 on 0-10 scale.  Patient oriented to room, unit, hourly rounding, and plan of care. Explained admission packet and patient handbook with patient bill of rights brochure. Will continue to monitor and document as needed.     Inpatient Nursing criteria listed below was met:    Health care directives status obtained and documented: Yes    Patient identifies a surrogate decision maker: Yes If yes, who:Jesica Contact Information:[phone], [phone]     If initial lactic acid greater than 2.0, repeat lactic acid drawn within one hour of arrival to unit: NA.     Clergy visit ordered if patient requests: No    Skin issues/needs documented: No    Isolation Patient: no Education given, correct sign in place and documentation row added to PCS:  not needed    Fall Prevention Yes: Care plan updated, education given and documented, sticker and magnet in place: Yes    Care Plan initiated: Yes    Education Documented (including assessment): Yes    Patient has discharge needs : Yes If yes, please explain:possible O2

## 2022-11-25 NOTE — ED PROVIDER NOTES
History     Chief Complaint   Patient presents with     Dizziness     HPI  Frances Gilliam is a 88 year old female pastmedical history of coronary artery disease, aortic valve sclerosis hyperlipidemia hypertension who presents to the ER today for evaluation of weakness.  Patient states last night she felt fine got up twice this morning around 2:00 and 4:00 to use the restroom had no problems there.  When she got up at 6:00 this morning she just had overall weakness noted that she had to use her cane to help her ambulate.  She denies any dizziness or lightheadedness no headache or vision changes.  She denies any ataxia difficulty with speech or difficulty swallowing.  She has no problems with thought process.  Patient denies any neck pain chest pain or shortness of breath.  She denies any abdominal pain no urinary symptoms.  She notes that she had some right leg pain that was evaluated by cardiology on Monday.  This is been ongoing for short period of time.  She denies any numbness or tingling or focal weakness.    Allergies:  Allergies   Allergen Reactions     Niacin Rash       Problem List:    Patient Active Problem List    Diagnosis Date Noted     Pulmonary hypertension (H) 11/25/2022     Priority: Medium     Hypoxia 11/25/2022     Priority: Medium     LBBB (left bundle branch block) 11/25/2022     Priority: Medium     Generalized muscle weakness 11/25/2022     Priority: Medium     Pacemaker 02/16/2021     Priority: Medium     Formatting of this note might be different from the original.  Second degree HB, syncope. Dr. Mohr implanted Pacemaker 2/14/2021  Device Information:   Generator: MICHEL Accolade DR TERRAZAS EL [id number] (SN: [id number] - Feb, 14 2021)  RA Lead: BS Ingevity+ [id number] (SN: [id number] - Feb, 14 2021)   RV Lead: BS Ingevity+ [id number] (SN: [id number] - Feb, 14 2021) RV basal septum       Aneurysm of infrarenal abdominal aorta 02/13/2021     Priority: Medium     Bilateral carotid artery stenosis 02/04/2020     Priority:  Medium     Other hyperlipidemia 02/04/2020     Priority: Medium     Coronary artery disease of native artery of native heart with stable angina pectoris (H) 02/04/2020     Priority: Medium     Formatting of this note might be different from the original.  99% stenosis to 1st RPL treated w/ PTCA, too small to stent.    Residual 50% LAD stenosis FFR pre 0.90, post 0.89, not stented       Essential hypertension 02/04/2020     Priority: Medium     Coronary atherosclerosis 12/21/2019     Priority: Medium     Disorder of bone and cartilage 12/21/2019     Priority: Medium     Hyperlipidemia 12/21/2019     Priority: Medium     Hypertension 12/21/2019     Priority: Medium     Macular degeneration 12/21/2019     Priority: Medium     Occlusion and stenosis of carotid artery without mention of cerebral infarction 12/21/2019     Priority: Medium     Overview:   with 50-75% stenoses of left, right internal carotid arteries, documented by   US 5/15/02 and 06/16/04       Closed fracture of multiple ribs of left side 12/21/2019     Priority: Medium     Syncope 12/21/2019     Priority: Medium     Multiple rib fractures 12/21/2019     Priority: Medium     Vitamin D deficiency      Priority: Medium     Vitamin B 12 deficiency      Priority: Medium     Peripheral vascular disease (H)      Priority: Medium     Iron deficiency anemia      Priority: Medium     History of MI (myocardial infarction)      Priority: Medium     GI hemorrhage      Priority: Medium     Essential thrombocytosis (H)      Priority: Medium     CAD (coronary artery disease)      Priority: Medium     Aortic valve sclerosis      Priority: Medium     Anemia 05/12/2016     Priority: Medium     Peptic ulcer disease 11/12/2014     Priority: Medium     Anxiety about health 11/04/2014     Priority: Medium     Panic disorder 11/04/2014     Priority: Medium     Asthma 10/11/2011     Priority: Medium     Mild intermittent asthma, uncomplicated 10/11/2011     Priority: Medium      Lumbago 2006     Priority: Medium     IMO Update 10/11       Sacroiliitis, not elsewhere classified (H) 2006     Priority: Medium     IMO Update 10/11          Past Medical History:    Past Medical History:   Diagnosis Date     Age-related macular degeneration      Aortic valve sclerosis      CAD (coronary artery disease)      Essential thrombocytosis (H)      GI hemorrhage      History of MI (myocardial infarction)      Hypertension      Iron deficiency anemia      Mixed hyperlipidemia      Peripheral vascular disease (H)      Vitamin B 12 deficiency      Vitamin D deficiency        Past Surgical History:    Past Surgical History:   Procedure Laterality Date     ESOPHAGOSCOPY, GASTROSCOPY, DUODENOSCOPY (EGD), COMBINED N/A 2017    Procedure: COMBINED ESOPHAGOSCOPY, GASTROSCOPY, DUODENOSCOPY (EGD);  UPPER ENDOSCOPY WITH BIOPSIES, ENTEROSCOPY WITH  SMALL BOWEL ARGON PLASMA COAGULATION;  Surgeon: Robin Vaughn MD;  Location: [facility] OR     DC PATIENT HAS A CORONARY ARTERY STENT         Family History:    Family History   Problem Relation Age of Onset     Heart Disease Father        Social History:  Marital Status:   [5]  Social History     Tobacco Use     Smoking status: Former     Types: Cigarettes     Quit date:      Years since quittin.9     Smokeless tobacco: Never   Substance Use Topics     Alcohol use: Yes     Comment: occasionally     Drug use: Never        Medications:    amLODIPine (NORVASC) 10 MG tablet  aspirin 81 MG EC tablet  Calcium Carb-Cholecalciferol (CALCIUM PLUS VITAMIN D3 PO)  clopidogrel (PLAVIX) 75 MG tablet  cyanocobalamin (VITAMIN B-12) 100 MCG tablet  ferrous sulfate (IRON) 325 (65 FE) MG tablet  fluticasone-vilanterol (BREO ELLIPTA) 100-25 MCG/INH inhaler  isosorbide mononitrate (IMDUR) 30 MG 24 hr tablet  metoprolol succinate ER (TOPROL XL) 25 MG 24 hr tablet  Multiple Vitamins-Minerals (ICAPS PO)  atorvastatin (LIPITOR) 40 MG tablet  losartan (COZAAR) 25 MG  "tablet          Review of Systems   Constitutional: Negative.    HENT: Negative.    Respiratory: Negative.    Cardiovascular: Negative.    Gastrointestinal: Negative.    Genitourinary: Negative.    Musculoskeletal: Negative.    Skin: Negative.    Neurological: Positive for weakness.   Hematological: Negative.    Psychiatric/Behavioral: Negative.        Physical Exam   BP: 166/72  Pulse: 82  Temp: 97.6  F (36.4  C)  Resp: 18  Height: 149.9 cm (4' 11\")  Weight: 50.3 kg (111 lb) (stated)  SpO2: (!) 86 %      Physical Exam  Constitutional:       General: She is not in acute distress.     Appearance: Normal appearance. She is normal weight. She is not ill-appearing, toxic-appearing or diaphoretic.   HENT:      Head: Normocephalic and atraumatic.      Right Ear: External ear normal.      Left Ear: External ear normal.   Eyes:      Extraocular Movements: Extraocular movements intact.      Conjunctiva/sclera: Conjunctivae normal.      Pupils: Pupils are equal, round, and reactive to light.   Cardiovascular:      Rate and Rhythm: Normal rate and regular rhythm.      Pulses: Normal pulses.      Heart sounds: Murmur heard.   Pulmonary:      Effort: Pulmonary effort is normal. No respiratory distress.   Musculoskeletal:         General: Normal range of motion.      Right lower leg: No edema.      Left lower leg: No edema.   Skin:     General: Skin is warm and dry.      Coloration: Skin is not jaundiced or pale.   Neurological:      Mental Status: She is alert and oriented to person, place, and time. Mental status is at baseline.      Cranial Nerves: No cranial nerve deficit.   Psychiatric:         Mood and Affect: Mood normal.         ED Course   I have reviewed the epic chart, the nurses note and triage note. vital signs were reviewed.  Lab work obtained.  EKG obtained and reviewed EKG shows left bundle branch block which is stable from previous EKG back in February.  No significant changes.   Patient's initial troponin was " elevated repeat troponin showed that it was falling.  BNP was 716.  Influenza COVID and RSV are negative.  The BMP TSH white blood cell count are normal.  Hemoglobin stable at 11.5 and she had an elevated D-dimer 1.172.  Given the age-adjusted elevated D-dimer I did CT angio of her chest.  There is no evidence of PE however there was noted to be emphysema along with pulmonary hypertension both of which are chronic.  UA was obtained which was not very specific for UTI will pend urine culture if needed there were moderate leukocyte Estrace few bacteria but no white blood cells.  Given this and I will think this is enough to support her weakness.  Patient was considerably hypoxic upon arrival patient was placed on oxygen while here.  When attempting to wean the patient off oxygen she becomes hypoxic again.  With ambulation she was in the low 60s to low 70s.  Give the patient a DuoNeb with no improvement or change in her symptoms or her oxygen saturation.  Given persistent hypoxia I will admit the patient at this time.      ED Course as of 12/05/22 1228   Fri Nov 25, 2022   1123 Troponin T, High Sensitivity(!)   1753 Troponin T, High Sensitivity(!): 58     Procedures                No results found for this or any previous visit (from the past 24 hour(s)).    Medications   sodium chloride (PF) 0.9% PF flush 100 mL (100 mLs Intravenous Given 11/25/22 1236)   iopamidol (ISOVUE-370) solution 45 mL (45 mLs Intravenous Given 11/25/22 1236)   ipratropium - albuterol 0.5 mg/2.5 mg/3 mL (DUONEB) neb solution 3 mL (3 mLs Nebulization Given 11/25/22 1402)       Assessments & Plan (with Medical Decision Making)     I have reviewed the nursing notes.    I have reviewed the findings, diagnosis, plan and need for follow up with the patient.      Discharge Medication List as of 11/27/2022  2:30 PM      START taking these medications    Details   predniSONE (DELTASONE) 20 MG tablet Take 2 tablets (40 mg) by mouth daily for 2 days,  Disp-4 tablet, R-0, E-Prescribe             Final diagnoses:   Hypoxia   Generalized muscle weakness       11/25/2022   [facility]     Imtiaz Fernandez PA-C  11/25/22 1756       Imtiaz Fernandez PA-C  12/05/22 1221

## 2022-11-25 NOTE — ED NOTES
Patient reports says was feeling weak today and just didn't feel good. Pt felt very weak when walking.  No cough or fever.  Doesn't normally wear O2

## 2022-11-26 ENCOUNTER — APPOINTMENT (OUTPATIENT)
Dept: PHYSICAL THERAPY | Facility: HOSPITAL | Age: 87
End: 2022-11-26
Attending: HOSPITALIST
Payer: MEDICARE

## 2022-11-26 LAB
ANION GAP SERPL CALCULATED.3IONS-SCNC: 7 MMOL/L (ref 7–15)
BUN SERPL-MCNC: 19.4 MG/DL (ref 8–23)
CALCIUM SERPL-MCNC: 8.7 MG/DL (ref 8.8–10.2)
CHLORIDE SERPL-SCNC: 104 MMOL/L (ref 98–107)
CREAT SERPL-MCNC: 0.82 MG/DL (ref 0.51–0.95)
DEPRECATED HCO3 PLAS-SCNC: 26 MMOL/L (ref 22–29)
ERYTHROCYTE [DISTWIDTH] IN BLOOD BY AUTOMATED COUNT: 15 % (ref 10–15)
GFR SERPL CREATININE-BSD FRML MDRD: 68 ML/MIN/1.73M2
GLUCOSE SERPL-MCNC: 146 MG/DL (ref 70–99)
HCT VFR BLD AUTO: 32.4 % (ref 35–47)
HGB BLD-MCNC: 10.2 G/DL (ref 11.7–15.7)
MCH RBC QN AUTO: 29.6 PG (ref 26.5–33)
MCHC RBC AUTO-ENTMCNC: 31.5 G/DL (ref 31.5–36.5)
MCV RBC AUTO: 94 FL (ref 78–100)
PLATELET # BLD AUTO: 231 10E3/UL (ref 150–450)
POTASSIUM SERPL-SCNC: 4.9 MMOL/L (ref 3.4–5.3)
RBC # BLD AUTO: 3.45 10E6/UL (ref 3.8–5.2)
SODIUM SERPL-SCNC: 137 MMOL/L (ref 136–145)
WBC # BLD AUTO: 2.4 10E3/UL (ref 4–11)

## 2022-11-26 PROCEDURE — 97161 PT EVAL LOW COMPLEX 20 MIN: CPT | Mod: GP

## 2022-11-26 PROCEDURE — 250N000009 HC RX 250: Performed by: HOSPITALIST

## 2022-11-26 PROCEDURE — 99225 PR SUBSEQUENT OBSERVATION CARE,LEVEL II: CPT | Performed by: INTERNAL MEDICINE

## 2022-11-26 PROCEDURE — 94640 AIRWAY INHALATION TREATMENT: CPT

## 2022-11-26 PROCEDURE — G0378 HOSPITAL OBSERVATION PER HR: HCPCS

## 2022-11-26 PROCEDURE — 250N000012 HC RX MED GY IP 250 OP 636 PS 637: Performed by: HOSPITALIST

## 2022-11-26 PROCEDURE — 94640 AIRWAY INHALATION TREATMENT: CPT | Mod: 76

## 2022-11-26 PROCEDURE — 250N000013 HC RX MED GY IP 250 OP 250 PS 637: Performed by: HOSPITALIST

## 2022-11-26 PROCEDURE — 82310 ASSAY OF CALCIUM: CPT | Performed by: HOSPITALIST

## 2022-11-26 PROCEDURE — 85027 COMPLETE CBC AUTOMATED: CPT | Performed by: HOSPITALIST

## 2022-11-26 PROCEDURE — 36415 COLL VENOUS BLD VENIPUNCTURE: CPT | Performed by: HOSPITALIST

## 2022-11-26 RX ORDER — ATORVASTATIN CALCIUM 40 MG/1
40 TABLET, FILM COATED ORAL AT BEDTIME
COMMUNITY
Start: 2022-11-07

## 2022-11-26 RX ORDER — LOSARTAN POTASSIUM 25 MG/1
12.5 TABLET ORAL AT BEDTIME
COMMUNITY
Start: 2022-06-08

## 2022-11-26 RX ADMIN — AMLODIPINE BESYLATE 5 MG: 5 TABLET ORAL at 09:03

## 2022-11-26 RX ADMIN — ATORVASTATIN CALCIUM 20 MG: 10 TABLET, FILM COATED ORAL at 09:03

## 2022-11-26 RX ADMIN — ASPIRIN 81 MG: 81 TABLET, COATED ORAL at 09:03

## 2022-11-26 RX ADMIN — IPRATROPIUM BROMIDE AND ALBUTEROL SULFATE 3 ML: .5; 3 SOLUTION RESPIRATORY (INHALATION) at 11:28

## 2022-11-26 RX ADMIN — FERROUS SULFATE TAB 325 MG (65 MG ELEMENTAL FE) 325 MG: 325 (65 FE) TAB at 09:02

## 2022-11-26 RX ADMIN — PREDNISONE 40 MG: 20 TABLET ORAL at 09:02

## 2022-11-26 RX ADMIN — IPRATROPIUM BROMIDE AND ALBUTEROL SULFATE 3 ML: .5; 3 SOLUTION RESPIRATORY (INHALATION) at 07:50

## 2022-11-26 RX ADMIN — IPRATROPIUM BROMIDE AND ALBUTEROL SULFATE 3 ML: .5; 3 SOLUTION RESPIRATORY (INHALATION) at 16:08

## 2022-11-26 ASSESSMENT — ACTIVITIES OF DAILY LIVING (ADL)
ADLS_ACUITY_SCORE: 22

## 2022-11-26 NOTE — PLAN OF CARE
"  Most recent vitals: /56 (BP Location: Right arm, Patient Position: Semi-Medina's)   Pulse 74   Temp 98.4  F (36.9  C) (Tympanic)   Resp 16   Ht 1.499 m (4' 11\")   Wt 51 kg (112 lb 6.4 oz)   SpO2 96%   BMI 22.70 kg/m      Pain Management:  denies pain this shift  LOC:  A&O  Cardiac:  HRR on telemetry SR 60-70s 1st degree block  Respiratory:  Lungs clear, coarse bases orignally on 3L now 1.5 L NC  GI:  Bowel sounds active  :  WNL  Skin Issues:  Scattered bruising    IVF:  IV SL  ABX:  N/A    Nutrition: low sat diet  ADL's:  Independent  Ambulation: SBA  Safety:  Call light within reach, room near nurses station, makes needs known    Face to face report given with opportunity to observe patient.    Report given to ALYSSA Pizano RN   11/26/2022  7:26 AM        "

## 2022-11-26 NOTE — PLAN OF CARE
Goal Outcome Evaluation:      Plan of Care Reviewed With: patient      Pt has been afebrile through the day, VS as charted.  Her O2 sats were in the mid 90's on 1.5 LPM via NC - titrated down to 1 LPM with sats in the low 90's.  Her lung sounds are diminished.  She does get slight MOREIRA/SOB with activity.  She has denied any pain/discomfort. She is saline locked, good oral intake/output.  She does have trace edema to BLE.She has remained free of falls, call light within reach - does make her needs known, frequent rounding done to assess needs.          Face to face report given with opportunity to observe patient.    Report given to Stephanie Lara RN   11/26/2022  7:27 PM

## 2022-11-26 NOTE — PLAN OF CARE
O2 titrated down to 1.5 L from 3 L . Patient denies SOB.     Face to face report given with opportunity to observe patient.    Report given to Stephanie Lott RN   11/26/2022  12:26 AM

## 2022-11-26 NOTE — PROGRESS NOTES
[facility]    Hospitalist Progress Note    Patient is a 88-year-old female who presented to the ER with complaints of just feeling very wobbly weak as though when she get up she might fall.  She was not overly short of breath she had no chest pains no nausea vomiting at all.  When she arrived to the ER she was hemodynamically stable afebrile but was somewhat hypoxic with sats 86% on room air.  The patient herself states that she does check her oxygen at home which is anywhere from 80 to 95%.  With exertion she does drop down at home.  She has not had any purulent sputum no fevers or shaking chills at all.  No increased peripheral edema.  No real wheezing.  She does take the Breo inhaler and seems to do fairly well with that.  In the ER she had a CT angiogram which was unremarkable no evidence of pulmonary embolism does have  Pulmonary artery suggesting pulm hypertension.  There was no evidence of any myocardial ischemia.  COVID and influenza were negative.  EKG showed just a left bundle branch block which is chronic for her sinus rhythm.  She was placed on O2 and admitted to the hospital.  She is getting some steroids and some bronchodilators.      Assessment & Plan     1.  Acute on chronic respiratory failure with hypoxia: Patient definitely was hypoxic when she got here although she states she is frequently hypoxic.  Further work-up in the ER with CT angiogram labs etc. did not show any obvious evidence of heart failure no obvious evidence of myocardial ischemia.  No clear-cut pneumonia.  No fevers at all.  She is admitted and has been between 1 to 3 L.  We will try and wean her off further she is getting some prednisone currently along with some duo nebs.  She feels much better today feels much stronger and not so unstable.  Do not know what to make of all this.  I think she does have underlying pulmonary hypertension underlying COPD/asthma.  She may do better with having some oxygen at home.  She  does check her sats frequently and she is many times in the 80% range.  We will see how she does over the next 24 hours tonight and we will talk about discharge possibly tomorrow.    2.  Coronary artery disease: Patient status post complex PCI intervention.  She has been doing well.  With no major problems.  Do not see any signs of ischemia at this point.  There is no evidence of any congestive heart failure.  Last echo had an EF around 63%.              Diet: Low Saturated Fat Na <2400 mg  Fluids: None    DVT Prophylaxis: Ambulate  Code Status: Full Code    Disposition: Expected discharge in 1-2 days once respiratory status is sorted out.    Gerardo Foster MD    Interval History   Alert pleasant no distress says she is feels a lot better today that weakness or wobbliness was not there.  She is on O2 sats have been greater than 90%.  She has been weaned down to only 1 L currently.  The issue is that she probably is hypoxic most of the time due to some underlying pulmonary disease.  We will see how she has by tomorrow if she still is hypoxic then I think we should qualify her for home oxygen.    -Data reviewed today: I reviewed all new labs and imaging results over the last 24 hours. I personally reviewed no images or EKG's today.    Physical Exam   Temp: 97.1  F (36.2  C) Temp src: Tympanic BP: (!) 116/48 Pulse: 83   Resp: 16 SpO2: 92 % O2 Device: Nasal cannula Oxygen Delivery: 1 LPM  Vitals:    11/25/22 0956 11/25/22 1653   Weight: 50.3 kg (111 lb) 51 kg (112 lb 6.4 oz)     Vital Signs with Ranges  Temp:  [97.1  F (36.2  C)-98.4  F (36.9  C)] 97.1  F (36.2  C)  Pulse:  [72-83] 83  Resp:  [16-19] 16  BP: (116-166)/() 116/48  SpO2:  [92 %-100 %] 92 %  I/O last 3 completed shifts:  In: 440 [P.O.:440]  Out: 400 [Urine:400]    Peripheral IV 11/25/22 Anterior;Left Upper forearm (Active)   Site Assessment WDL 11/26/22 0900   Line Status Saline locked 11/26/22 0900   Dressing Intervention New dressing  11/25/22  1022   Phlebitis Scale 0-->no symptoms 11/26/22 0900   Infiltration Scale 0 11/26/22 0900   Number of days: 1     No line/device    Constitutional: Alert and oriented x3. No distress    HEENT: Normocephalic/atraumatic, PERRL, EOMI, mouth moist, neck supple, no LN.     Cardiovascular: RRR.  No murmur, no  rubs, or gallops.  JVD no.  Bruits no.  Pulses 2    Respiratory: Decreased but basically clear clear to auscultation bilaterally    Abdomen: Soft, nontender, nondistended, no organomegaly. Bowel sounds present    extremities: Warm/dry.  No edema    Neuro:   Non focal, cranial nerves intact, Moves all extremities.  Medications       amLODIPine  5 mg Oral Daily     aspirin  81 mg Oral Daily     atorvastatin  20 mg Oral Daily     ferrous sulfate  325 mg Oral Daily with breakfast     ipratropium - albuterol 0.5 mg/2.5 mg/3 mL  3 mL Nebulization Q4H While awake     predniSONE  40 mg Oral Daily     sodium chloride (PF)  3 mL Intracatheter Q8H       Data   Recent Labs   Lab 11/26/22  0638 11/25/22  1019   WBC 2.4* 6.1   HGB 10.2* 11.5*   MCV 94 94    272    138   POTASSIUM 4.9 4.1   CHLORIDE 104 103   CO2 26 25   BUN 19.4 17.6   CR 0.82 0.77   ANIONGAP 7 10   CATARINA 8.7* 9.1   * 99       No results found for this or any previous visit (from the past 24 hour(s)).

## 2022-11-26 NOTE — PROGRESS NOTES
11/26/22 1200   Appointment Info   Signing Clinician's Name / Credentials (PT) Trudy Hartley DPT   Living Environment   People in Home alone   Current Living Arrangements apartment   Home Accessibility no concerns   Self-Care   Usual Activity Tolerance moderate   Current Activity Tolerance moderate   Equipment Currently Used at Home cane, straight   Fall history within last six months no   Activity/Exercise/Self-Care Comment Has walker but does not use it regularily   General Information   Onset of Illness/Injury or Date of Surgery 11/25/22   Referring Physician Dr. Rocha   Patient/Family Therapy Goals Statement (PT) go home today   Pertinent History of Current Problem (include personal factors and/or comorbidities that impact the POC) Pt hospitalized c weakness, hypoxia, pulmonary HTN. PMH pacemaker, asthma   Existing Precautions/Restrictions fall   Weight-Bearing Status - LLE full weight-bearing   Weight-Bearing Status - RLE full weight-bearing   Cognition   Affect/Mental Status (Cognition) WNL   Orientation Status (Cognition) oriented x 4   Follows Commands (Cognition) WFL   Pain Assessment   Patient Currently in Pain No   Integumentary/Edema   Integumentary/Edema no deficits were identifed   Posture    Posture Not impaired   Range of Motion (ROM)   Range of Motion ROM is WFL   Strength (Manual Muscle Testing)   Strength (Manual Muscle Testing) strength is WFL   Bed Mobility   Bed Mobility no deficits identified   Transfers   Transfers no deficits identified   Gait/Stairs (Locomotion)   [place] Level (Gait) independent   Distance in Feet (Required for LE Total Joints) 300'   Pattern (Gait) swing-through   Comment, (Gait/Stairs) Pt on 1L O2 during ambulation and dropped to 81% following ambulation with very mild SOB. Recovered quickly with seated break   Balance   Balance no deficits were identified   Sensory Examination   Sensory Perception WNL   Coordination   Coordination no deficits were  identified   Muscle Tone   Muscle Tone no deficits were identified   Clinical Impression   Criteria for Skilled Therapeutic Intervention Evaluation only;Current level of function same as previous level of function   PT Diagnosis (PT) h/o weakness   Influenced by the following impairments O2 dependence   Functional limitations due to impairments Mildy decreased activity tolerance   Clinical Presentation (PT Evaluation Complexity) Stable/Uncomplicated   Clinical Presentation Rationale Clinical judgement   Clinical Decision Making (Complexity) low complexity   Risk & Benefits of therapy have been explained evaluation/treatment results reviewed;participants included;patient   Clinical Impression Comments PT evaluation completed for discharge recommendation. Pt is independent with ambulation and transfers c no safety concerns. Pt encouraged to ambulate with nursing staff at least 3x/day. Pt is on O2 and was not previously   PT Total Evaluation Time   PT Eval, Low Complexity Minutes (54952) 15   PT Discharge Planning   PT Plan Pt is independent with ambulation and was encouraged to ambulate with nursing staff   PT Discharge Recommendation (DC Rec) home   PT Rationale for DC Rec PT evaluation completed for discharge recommendation. Pt is independent with ambulation and transfers c no safety concerns. Pt encouraged to ambulate with nursing staff at least 3x/day. Pt is on O2 and was not previously   PT Brief overview of current status Independent transfers and ambulation   Total Session Time   Total Session Time (sum of timed and untimed services) 15

## 2022-11-26 NOTE — PLAN OF CARE
Goal Outcome Evaluation:         Pt A&O. VSS on 2-3L per NC. Denies pain. LS bases dim. IV SL. Up with SBA. Low sodium diet, good appetite.    Face to face report given with opportunity to observe patient.    Report given to ALYSSA Landry RN   11/25/2022  7:15 PM

## 2022-11-27 VITALS
WEIGHT: 112.4 LBS | HEIGHT: 59 IN | BODY MASS INDEX: 22.66 KG/M2 | SYSTOLIC BLOOD PRESSURE: 129 MMHG | OXYGEN SATURATION: 98 % | DIASTOLIC BLOOD PRESSURE: 55 MMHG | HEART RATE: 72 BPM | RESPIRATION RATE: 14 BRPM | TEMPERATURE: 97.7 F

## 2022-11-27 LAB
ALBUMIN SERPL BCG-MCNC: 3.4 G/DL (ref 3.5–5.2)
ALP SERPL-CCNC: 80 U/L (ref 35–104)
ALT SERPL W P-5'-P-CCNC: 14 U/L (ref 10–35)
ANION GAP SERPL CALCULATED.3IONS-SCNC: 7 MMOL/L (ref 7–15)
AST SERPL W P-5'-P-CCNC: 21 U/L (ref 10–35)
BACTERIA UR CULT: NORMAL
BILIRUB SERPL-MCNC: 0.2 MG/DL
BUN SERPL-MCNC: 28.4 MG/DL (ref 8–23)
CALCIUM SERPL-MCNC: 8.8 MG/DL (ref 8.8–10.2)
CHLORIDE SERPL-SCNC: 100 MMOL/L (ref 98–107)
CREAT SERPL-MCNC: 0.81 MG/DL (ref 0.51–0.95)
DEPRECATED HCO3 PLAS-SCNC: 26 MMOL/L (ref 22–29)
GFR SERPL CREATININE-BSD FRML MDRD: 69 ML/MIN/1.73M2
GLUCOSE SERPL-MCNC: 99 MG/DL (ref 70–99)
POTASSIUM SERPL-SCNC: 4.6 MMOL/L (ref 3.4–5.3)
PROT SERPL-MCNC: 5.7 G/DL (ref 6.4–8.3)
SODIUM SERPL-SCNC: 133 MMOL/L (ref 136–145)

## 2022-11-27 PROCEDURE — 82040 ASSAY OF SERUM ALBUMIN: CPT | Performed by: INTERNAL MEDICINE

## 2022-11-27 PROCEDURE — G0378 HOSPITAL OBSERVATION PER HR: HCPCS

## 2022-11-27 PROCEDURE — 36415 COLL VENOUS BLD VENIPUNCTURE: CPT | Performed by: INTERNAL MEDICINE

## 2022-11-27 PROCEDURE — 99217 PR OBSERVATION CARE DISCHARGE: CPT | Performed by: INTERNAL MEDICINE

## 2022-11-27 PROCEDURE — 250N000013 HC RX MED GY IP 250 OP 250 PS 637: Performed by: HOSPITALIST

## 2022-11-27 PROCEDURE — 250N000012 HC RX MED GY IP 250 OP 636 PS 637: Performed by: HOSPITALIST

## 2022-11-27 PROCEDURE — 999N000157 HC STATISTIC RCP TIME EA 10 MIN

## 2022-11-27 PROCEDURE — 80053 COMPREHEN METABOLIC PANEL: CPT | Performed by: INTERNAL MEDICINE

## 2022-11-27 RX ORDER — PREDNISONE 20 MG/1
40 TABLET ORAL DAILY
Qty: 4 TABLET | Refills: 0 | Status: SHIPPED | OUTPATIENT
Start: 2022-11-28 | End: 2022-11-30

## 2022-11-27 RX ADMIN — AMLODIPINE BESYLATE 5 MG: 5 TABLET ORAL at 09:13

## 2022-11-27 RX ADMIN — FERROUS SULFATE TAB 325 MG (65 MG ELEMENTAL FE) 325 MG: 325 (65 FE) TAB at 09:13

## 2022-11-27 RX ADMIN — ASPIRIN 81 MG: 81 TABLET, COATED ORAL at 09:13

## 2022-11-27 RX ADMIN — ATORVASTATIN CALCIUM 20 MG: 10 TABLET, FILM COATED ORAL at 09:13

## 2022-11-27 RX ADMIN — PREDNISONE 40 MG: 20 TABLET ORAL at 09:13

## 2022-11-27 ASSESSMENT — ACTIVITIES OF DAILY LIVING (ADL)
ADLS_ACUITY_SCORE: 23

## 2022-11-27 NOTE — PROGRESS NOTES
Brunner, Megan, RN  Registered Nurse  Nursing  Care Plan  Addendum  Date of Service:  11/27/2022 10:16 AM  Creation Time:  11/27/2022 10:16 AM     Addendum                        Pt satting 92% on RA sitting on edge of bed. Pt ambulated in halls with SBA, checked O2 and 74% on RA when back in room. Slightly dyspnic. Quickly recovered back to 90s on 1L. MD at bedside and updated.            Electronically signed by Brunner, Megan, RN at 11/27/2022 10:18 AM   Electronically signed by Brunner, Megan, RN at 11/27/2022 10:19 AM  I certify that this patient, Frances Gilliam has been under my care (or a nurse practitioner or physican's assistant working with me). This is the face-to-face encounter for oxygen medical necessity.      Frances Gilliam is now in a chronic stable state and continues to require supplemental oxygen. Patient has continued oxygen desaturation due to COPD J44.9  Pulmonary Hypertension I27.20.    Alternative treatment(s) tried or considered and deemed clinically infective for treatment of COPD J44.9  Pulmonary Hypertension I27.20 include nebulizers, inhalers, steroids and pulmonary toileting.  If portability is ordered, is the patient mobile within the home? yes    **Patients who qualify for home O2 coverage under the CMS guidelines require ABG tests or O2 sat readings obtained closest to, but no earlier than 2 days prior to the discharge, as evidence of the need for home oxygen therapy. Testing must be performed while patient is in the chronic stable state. See notes for O2 sats.**

## 2022-11-27 NOTE — CARE PLAN
Pt satting 92% on RA sitting on edge of bed. Pt ambulated in halls with SBA, checked O2 and 74% on RA when back in room. Slightly dyspnic. Quickly recovered back to 90s on 1L. MD at bedside and updated.

## 2022-11-27 NOTE — PLAN OF CARE
"  Most recent vitals: BP (!) 127/48 (BP Location: Right arm, Patient Position: Supine)   Pulse 72   Temp 98  F (36.7  C) (Tympanic)   Resp 16   Ht 1.499 m (4' 11\")   Wt 51 kg (112 lb 6.4 oz)   SpO2 96%   BMI 22.70 kg/m      Pain Management:  denies pain this shift  LOC:  A&O  Cardiac:  on telemetry per ICU SR w/ 1st degree av block  Respiratory:  lung dim/clear, originally on 1L tried to titrate to RA, pt did desat to 88%, put back on 1L and now titrated to 1/2L  GI:  Bowel sounds active  :  Good urine output as charted  Skin Issues:  Scattered bruising    IVF:  IV SL  ABX:  N/A    Nutrition: low fat   ADL's:  independent  Ambulation: SBA  Safety:  Call light within reach, room near nurse station, makes needs known    Face to face report given with opportunity to observe patient.    Report given to ALYSSA Orourke RN   11/27/2022  7:01 AM              "

## 2022-11-27 NOTE — PLAN OF CARE
Patient discharged at 3:00 PM via wheel chair accompanied by other: and staff. Prescriptions sent to patients preferred pharmacy. All belongings sent with patient.     Discharge instructions reviewed with patient. Listed belongings gathered and returned to patient.     [place]       [facility] up appointment made:  No, pt to make tomorrow d/t weekend  Home medications returned to patient: N/A  Patient reports pain was well managed at discharge: Yes

## 2022-12-11 NOTE — DISCHARGE SUMMARY
[facility]  Hospitalist Discharge Summary      Date of Admission:  11/25/2022  Date of Discharge:  11/27/2022  3:00 PM  Discharging Provider: Gerardo Foster MD  Discharge Service: Hospitalist Service    Discharge Diagnoses   Acute on chronic respiratory failure with hypoxia  Possible pulm hypertension  Coronary artery disease      Follow-ups Needed After Discharge   Consider pulmonology evaluation.  Unresulted Labs Ordered in the Past 30 Days of this Admission     No orders found from 10/26/2022 to 11/26/2022.      These results will be followed up by not needed    Discharge Disposition   Discharged to home  Condition at discharge: Stable       Hospital Course      Patient is a 88-year-old female who presented to the ER with complaints of just feeling very wobbly weak as though when she get up she might fall.  She was not overly short of breath she had no chest pains no nausea vomiting at all.  When she arrived to the ER she was hemodynamically stable afebrile but was somewhat hypoxic with sats 86% on room air.  The patient herself states that she does check her oxygen at home which is anywhere from 80 to 95%.  With exertion she does drop down at home.  She has not had any purulent sputum no fevers or shaking chills at all.  No increased peripheral edema.  No real wheezing.  She does take the Breo inhaler and seems to do fairly well with that.  In the ER she had a CT angiogram which was unremarkable no evidence of pulmonary embolism does have  Pulmonary artery suggesting pulm hypertension.  There was no evidence of any myocardial ischemia.  COVID and influenza were negative.  EKG showed just a left bundle branch block which is chronic for her sinus rhythm.  She was placed on O2 and admitted to the hospital.  She is getting some steroids and some bronchodilators.    1.  Acute on chronic respiratory failure with hypoxia: Patient definitely was hypoxic when she got here although she states she is  frequently hypoxic.  Further work-up in the ER with CT angiogram labs etc. did not show any obvious evidence of heart failure no obvious evidence of myocardial ischemia.  No clear-cut pneumonia.  No fevers at all.  She is admitted and has been between 1 to 3 L.  We will try and wean her off further she is getting some prednisone currently along with some duo nebs.  She feels much better today feels much stronger and not so unstable.  Do not know what to make of all this.  I think she does have underlying pulmonary hypertension underlying COPD/asthma.  She may do better with having some oxygen at home.  She does check her sats frequently and she is many times in the 80% range.  We will see how she does over the next 24 hours tonight and we will talk about discharge possibly tomorrow.    MD discharge room air she was 92% however after ambulation her O2 sats did drop down did not 74%.  Did recover relatively quickly but over the 90s with 1 L of oxygen administered.  We did qualify her for home oxygen.  Likely patient does have some underlying pulmonary issue likely some COPD.  She should have further pulmonary work-up with her primary care provider.  Really was no signs of any congestive heart failure even though she does have cardiac history.     2.  Coronary artery disease: Patient status post complex PCI intervention.  She has been doing well.  With no major problems.  Do not see any signs of ischemia at this point.  There is no evidence of any congestive heart failure.  Last echo had an EF around 63%.                Consultations This Hospital Stay   PHYSICAL THERAPY ADULT IP CONSULT  OCCUPATIONAL THERAPY ADULT IP CONSULT  RESPIRATORY CARE IP CONSULT    Code Status   Prior    Time Spent on this Encounter   I, Gerardo Foster MD, personally saw the patient today and spent greater than 30 minutes discharging this patient.       Gerardo Foster MD  [facility]  [address] [place] [address]  [address]  Phone: [phone]  Fax: [phone]  ______________________________________________________________________    Physical Exam   Vital Signs:                    Weight: 112 lbs 6.4 oz  Constitutional: awake, alert, cooperative, no apparent distress, and appears stated age  Respiratory: Some decreased breath sounds bilaterally but no wheezes crackles or rhonchi or consolidation.  Cardiovascular: Normal apical impulse, regular rate and rhythm, normal S1 and S2, no S3 or S4, and no murmur noted  GI: No scars, normal bowel sounds, soft, non-distended, non-tender, no masses palpated, no hepatosplenomegally  Musculoskeletal: There is no redness, warmth, or swelling of the joints.  Full range of motion noted.  Motor strength is 5 out of 5 all extremities bilaterally.  Tone is normal.       Primary Care Physician   Johan Huggins    Discharge Orders      Reason for your hospital stay    Patient presented to the ER which is extreme weakness and some dyspnea.  She was found to be hypoxic.  CT angiogram did not show any evidence of pulmonary embolism or obvious pneumonia.  She was treated as though possible COPD exacerbation.  With steroids some bronchodilators.  CT angiogram suggests pulmonary hypertension.  Did improve however still requires oxygen as she drops down significantly with exertion.  This is not a new issue per the patient.  She declines bronchodilators.  Should have follow-up with her primary care provider within 1 week.     Follow-up and recommended labs and tests     Follow up with primary care provider, Johan Huggins, within 7 days for hospital follow- up.  No follow up labs or test are needed.     Activity    Your activity upon discharge: activity as tolerated     Oxygen Adult/Peds     Diet    Follow this diet upon discharge: Orders Placed This Encounter      Low Saturated Fat Na <2400 mg       Significant Results and Procedures   Most Recent 3 CBC's:Recent Labs   Lab Test  11/26/22  0638 11/25/22  1019 02/16/22  0305   WBC 2.4* 6.1 6.6   HGB 10.2* 11.5* 12.9   MCV 94 94 94    272 290     Most Recent 3 BMP's:Recent Labs   Lab Test 11/27/22  0820 11/26/22  0638 11/25/22  1019   * 137 138   POTASSIUM 4.6 4.9 4.1   CHLORIDE 100 104 103   CO2 26 26 25   BUN 28.4* 19.4 17.6   CR 0.81 0.82 0.77   ANIONGAP 7 7 10   CATARINA 8.8 8.7* 9.1   GLC 99 146* 99     Most Recent 2 LFT's:Recent Labs   Lab Test 11/27/22  0820 02/16/22  0305   AST 21 18   ALT 14 24   ALKPHOS 80 116   BILITOTAL 0.2 0.5     Most Recent 3 BNP's:Recent Labs   Lab Test 11/25/22  1226 02/16/22  0305 02/13/21  1346   NTBNPI 716 831 2,272*     Most Recent ABG:No lab results found.,   Results for orders placed or performed during the hospital encounter of 11/25/22   XR Chest Port 1 View    Narrative    Procedure:XR CHEST PORT 1 VIEW    Clinical history:Female, 88 years, weakness    Technique: Single view was obtained.    Comparison: 2/16/2022    Findings: The cardiac silhouette is within normal limits. The  pulmonary vasculature is normal.    The lungs hyperinflated and demonstrate linear areas of atelectasis in  the lung bases. Bony structures demonstrate severe degenerative  changes of the shoulders and AC joints, similar when compared to prior  study.      Impression    Impression:   Persistent hyperinflation of the lungs with mild bibasilar  atelectasis.    BEHZAD ANTHONY MD         SYSTEM ID:  [id number]   CTA Chest with Contrast    Narrative    Exam:  CTA CHEST WITH CONTRAST    Exam reason:  elevated d  dimer, hypoxia    Technique:  Contrast enhanced helical CT pulmonary angiography was  performed. Sagittal and coronal reformats as well as coronal MIP  reformats were obtained. This CT was performed using one or more of  the following dose reduction techniques: automated exposure control,  adjustment of the mA and/or kV according to patient size, and/or use  of iterative reconstruction technique.    Meds/Contrast:  Isovue-370, 45 mL    Comparison:  11/25/2022, 2/13/2021, 12/21/2019     FINDINGS:    Technical quality: Diagnostic.    Cardiovascular:   -There are no filling defects in the pulmonary arteries that would  indicate pulmonary embolism.  -The main pulmonary artery is enlarged, likely due to pulmonary  hypertension.  -There is a left chest pacemaker generator with leads in the right  atrium and right ventricle. Lungs/Airways: Moderate centrilobular  emphysema. There are a few stable pulmonary nodules. These include a 5  mm nodule in the right middle lobe (series 5 image 69), a 4 mm  subpleural nodule in the right middle lobe (series 5 image 82), a 3 mm  subpleural nodule in the right lower lobe (series 5 image 88), and a 3  mm nodule in the right middle lobe (series 5 image 98). There is mild  bilateral interstitial thickening around the periphery of the lungs.  Cristy/Mediastinum: No mass or adenopathy.  Pleura: There is a small right and a trace left pleural effusion,  similar to the prior exam.  Chest Wall/Axilla: Unremarkable.    There is a large right thyroid nodule, similar to 2021.    Visualized Upper Abdomen: No acute findings. There are a couple of  calcified gallstones in the dependent gallbladder without findings to  suggest acute cholecystitis. Ectasia of the infra renal abdominal  aorta is again noted.  Musculoskeletal: No acute osseous abnormalities. There are sequela of  remote left rib fractures.      Impression    IMPRESSION:    No pulmonary embolism.    The main pulmonary artery is enlarged, likely due to pulmonary  hypertension.    There are a couple of stable small pulmonary nodules.    Small bilateral pleural effusions, similar to 2021.    ROSIE RONDON MD         SYSTEM ID:  [id number]       Discharge Medications   Discharge Medication List as of 11/27/2022  2:30 PM      START taking these medications    Details   predniSONE (DELTASONE) 20 MG tablet Take 2 tablets (40 mg) by mouth daily for 2 days,  Disp-4 tablet, R-0, E-Prescribe         CONTINUE these medications which have NOT CHANGED    Details   amLODIPine (NORVASC) 10 MG tablet TAKE 1 TABLET BY MOUTH ONCE DAILY FOR HIGH BLOOD PRESSURE, Historical      aspirin 81 MG EC tablet Take 81 mg by mouth daily, Historical      Calcium Carb-Cholecalciferol (CALCIUM PLUS VITAMIN D3 PO) Take 1 tablet by mouth 2 times daily, Historical      clopidogrel (PLAVIX) 75 MG tablet Take 75 mg by mouth daily, Historical      cyanocobalamin (VITAMIN B-12) 100 MCG tablet Take 1 tablet by mouth daily as directed., Historical      ferrous sulfate (IRON) 325 (65 FE) MG tablet Take 325 mg by mouth daily (with breakfast), Historical      fluticasone-vilanterol (BREO ELLIPTA) 100-25 MCG/INH inhaler Inhale 1 puff into the lungs daily, Historical      isosorbide mononitrate (IMDUR) 30 MG 24 hr tablet TAKE 1 TABLET BY MOUTH ONCE DAILY AT 8 AM, Historical      metoprolol succinate ER (TOPROL XL) 25 MG 24 hr tablet Take 25 mg by mouth daily, Historical      Multiple Vitamins-Minerals (ICAPS PO) Take 1 capsule by mouth daily, Historical      atorvastatin (LIPITOR) 40 MG tablet Take 40 mg by mouth At Bedtime, Historical      losartan (COZAAR) 25 MG tablet TAKE 1/2 (ONE-HALF) TABLET BY MOUTH ONCE DAILY, Historical         STOP taking these medications       Nitroglycerin (NITROTAB SL) Comments:   Reason for Stopping:             Allergies   Allergies   Allergen Reactions     Niacin Rash

## 2023-05-31 ENCOUNTER — HOSPITAL ENCOUNTER (EMERGENCY)
Facility: HOSPITAL | Age: 88
Discharge: HOME | End: 2023-05-31
Attending: EMERGENCY MEDICINE | Admitting: EMERGENCY MEDICINE
Payer: MEDICARE

## 2023-05-31 VITALS
HEART RATE: 75 BPM | DIASTOLIC BLOOD PRESSURE: 71 MMHG | TEMPERATURE: 97.8 F | OXYGEN SATURATION: 96 % | RESPIRATION RATE: 18 BRPM | SYSTOLIC BLOOD PRESSURE: 143 MMHG

## 2023-05-31 DIAGNOSIS — R09.89 CHOKING EPISODE: ICD-10-CM

## 2023-05-31 DIAGNOSIS — Z99.81 SUPPLEMENTAL OXYGEN DEPENDENT: ICD-10-CM

## 2023-05-31 PROCEDURE — 99282 EMERGENCY DEPT VISIT SF MDM: CPT | Performed by: EMERGENCY MEDICINE

## 2023-05-31 PROCEDURE — 99283 EMERGENCY DEPT VISIT LOW MDM: CPT

## 2023-05-31 NOTE — ED NOTES
"Patient presents after she choked on a piece of steak, it has passed, and she is able to drink water, talk.   No difficulty breathing.   No increased difficulty breathing, is on 1LPM of O2 @ home.   No other complaints at this time, \"I was just scared and called 911, I'm better now\".  A&Ox4.  "

## 2023-05-31 NOTE — ED PROVIDER NOTES
"  History     Chief Complaint   Patient presents with     food stuck     Steak stuck in throat at 1630   Expand All Collapse All                                Pt presents to [facility] via ambulance. States at about 1630 she was eating steak, had chocked on it, it did pass but she was coughing and coughing. States she couldn't pass water it would \"comeright back up\" Pt called 911 for herself to get checked out, pt states the steak must have passed in the stone time because she can now drink water and feel no pain or pressure.   Pt is still clearing her throat and coughing in triage, pt states it is allergies.,        HPI  Frances Gilliam is a 89 year old female who presents via EMS.  Report from EMS to myself and from independent history from the patient.    EMS notes the patient was eating small pieces of steak when she choked on a piece of steak and briefly felt like something stuck in her throat.  She is chronically on home oxygen and she was noted to have intact airway breathing circulation.  She is transported here after the above event.  Paramedics describe the steak that she was eating being cut up in small pieces.  Since arrival in the ER, she has been able to drink fluids without difficulty and reports history of seasonal allergies with mild chronic cough.  Patient denies any other complaints at this time.  She is chronically on home oxygen at 1 L.    Upon my assessment for the patient, is normal.  She reports she is chronically on oxygen which she uses at home but can take it off to vacuum at times.  She denies any other complaints and does note she had her pacemaker interrogated normally yesterday and this is confirmed upon my independent review of a [facility] clinic visit yesterday at 1301 p.m.    On my assessment patient has been drinking water and I watched her drink water without difficulty and she is watching television.  She notes she does not have her cell phone and is requested discharge and that her " family pick her up now that she feels normal is unable to take fluids.  She has 2 children that live within 20 minutes of [place] specifically Lobo and Altaf.    Independent historian: EMS.    Allergies:  Allergies   Allergen Reactions     Niacin Rash       Problem List:    Patient Active Problem List    Diagnosis Date Noted     Pulmonary hypertension (H) 11/25/2022     Priority: Medium     Hypoxia 11/25/2022     Priority: Medium     LBBB (left bundle branch block) 11/25/2022     Priority: Medium     Generalized muscle weakness 11/25/2022     Priority: Medium     Pacemaker 02/16/2021     Priority: Medium     Formatting of this note might be different from the original.  Second degree HB, syncope. Dr. Mohr implanted Pacemaker 2/14/2021  Device Information:   Generator: BS Accolade DR TERRAZAS EL [id number] (SN: [id number] - Feb, 14 2021)  RA Lead: BS Ingevity+ [id number] (SN: [id number] - Feb, 14 2021)   RV Lead: BS Ingevity+ [id number] (SN: [id number] - Feb, 14 2021) RV basal septum       Aneurysm of infrarenal abdominal aorta (H) 02/13/2021     Priority: Medium     Bilateral carotid artery stenosis 02/04/2020     Priority: Medium     Other hyperlipidemia 02/04/2020     Priority: Medium     Coronary artery disease of native artery of native heart with stable angina pectoris (H) 02/04/2020     Priority: Medium     Formatting of this note might be different from the original.  99% stenosis to 1st RPL treated w/ PTCA, too small to stent.    Residual 50% LAD stenosis FFR pre 0.90, post 0.89, not stented       Essential hypertension 02/04/2020     Priority: Medium     Coronary atherosclerosis 12/21/2019     Priority: Medium     Disorder of bone and cartilage 12/21/2019     Priority: Medium     Hyperlipidemia 12/21/2019     Priority: Medium     Hypertension 12/21/2019     Priority: Medium     Macular degeneration 12/21/2019     Priority: Medium     Occlusion and stenosis of carotid artery without mention of cerebral infarction 12/21/2019      Priority: Medium     Overview:   with 50-75% stenoses of left, right internal carotid arteries, documented by   US 5/15/02 and 06/16/04       Closed fracture of multiple ribs of left side 12/21/2019     Priority: Medium     Syncope 12/21/2019     Priority: Medium     Multiple rib fractures 12/21/2019     Priority: Medium     Vitamin D deficiency      Priority: Medium     Vitamin B 12 deficiency      Priority: Medium     Peripheral vascular disease (H)      Priority: Medium     Iron deficiency anemia      Priority: Medium     History of MI (myocardial infarction)      Priority: Medium     GI hemorrhage      Priority: Medium     Essential thrombocytosis (H)      Priority: Medium     CAD (coronary artery disease)      Priority: Medium     Aortic valve sclerosis      Priority: Medium     Anemia 05/12/2016     Priority: Medium     Peptic ulcer disease 11/12/2014     Priority: Medium     Anxiety about health 11/04/2014     Priority: Medium     Panic disorder 11/04/2014     Priority: Medium     Asthma 10/11/2011     Priority: Medium     Mild intermittent asthma, uncomplicated 10/11/2011     Priority: Medium     Lumbago 09/06/2006     Priority: Medium     IMO Update 10/11       Sacroiliitis, not elsewhere classified (H) 09/06/2006     Priority: Medium     IMO Update 10/11          Past Medical History:    Past Medical History:   Diagnosis Date     Age-related macular degeneration      Aortic valve sclerosis      CAD (coronary artery disease)      Essential thrombocytosis (H)      GI hemorrhage      History of MI (myocardial infarction)      Hypertension      Iron deficiency anemia      Mixed hyperlipidemia      Peripheral vascular disease (H)      Vitamin B 12 deficiency      Vitamin D deficiency        Past Surgical History:    Past Surgical History:   Procedure Laterality Date     ESOPHAGOSCOPY, GASTROSCOPY, DUODENOSCOPY (EGD), COMBINED N/A 12/27/2017    Procedure: COMBINED ESOPHAGOSCOPY, GASTROSCOPY, DUODENOSCOPY (EGD);   UPPER ENDOSCOPY WITH BIOPSIES, ENTEROSCOPY WITH  SMALL BOWEL ARGON PLASMA COAGULATION;  Surgeon: Robin Vaughn MD;  Location: [facility]     MD PATIENT HAS A CORONARY ARTERY STENT         Family History:    Family History   Problem Relation Age of Onset     Heart Disease Father        Social History:  Marital Status:   [5]  Social History     Tobacco Use     Smoking status: Former     Types: Cigarettes     Quit date:      Years since quittin.4     Smokeless tobacco: Never   Substance Use Topics     Alcohol use: Yes     Comment: occasionally     Drug use: Never        Medications:    amLODIPine (NORVASC) 10 MG tablet  aspirin 81 MG EC tablet  atorvastatin (LIPITOR) 40 MG tablet  Calcium Carb-Cholecalciferol (CALCIUM PLUS VITAMIN D3 PO)  clopidogrel (PLAVIX) 75 MG tablet  cyanocobalamin (VITAMIN B-12) 100 MCG tablet  ferrous sulfate (IRON) 325 (65 FE) MG tablet  fluticasone-vilanterol (BREO ELLIPTA) 100-25 MCG/INH inhaler  isosorbide mononitrate (IMDUR) 30 MG 24 hr tablet  losartan (COZAAR) 25 MG tablet  metoprolol succinate ER (TOPROL XL) 25 MG 24 hr tablet  Multiple Vitamins-Minerals (ICAPS PO)          Review of Systems  Chronically on oxygen.  No fever, sore throat, acute cough, chest pain or shortness of breath.  Reports mild allergies and chronic cough.  Physical Exam   BP: 151/61  Pulse: 78  Temp: 97.8  F (36.6  C)  Resp: 18  SpO2: 94 %      Physical Exam  CONSTITUTIONAL: Pleasant female on nasal cannula oxygen at 1 L.  She speaks in full sentences without distress.  She has slightly prolonged expiration phase.  She is watching television.  Vitals: reviewed in epic  HEAD: normal inspection  EYES: Anicteric sclerae; no proptosis. CN 2-7 grossly intact  Ears: intact hearing to spoken voice  Oropharynx is normal.  No swelling.  Normal voice.  NECK: trachea midline.  No stridor.  No JVD.  No mass.  RESPIRATORY: Normal respiratory effort.  Slightly prolonged expiration phase.  Speaks in full sentences.   "No wheezing or retractions.  CARDIOVASCULAR: No peripheral edema.  Normal rate.  ABD: normal inspection.   SKIN: No rash, lesions or ulcers on exposed skin  MUSCULOSKELETAL No digital cyanosis  NEURO: alert and oriented,  fluent speech. no focal weakness.   Psych: cooperative, intact judgment.  Jovial and friendly attitude.  When I asked her if she would like some water she states it is \"time for Lauryn\"    ED Course                 Procedures         No results found for this or any previous visit (from the past 24 hour(s)).    Medications - No data to display    Assessments & Plan (with Medical Decision Making)     I have reviewed the nursing verbal report.  I independent reviewed records from yesterday which confirms a routine pacemaker check that is functioning normally.  I discussed the case with the EMS who served as independent historians.  We contacted the patient's family who will  the patient's oxygen from home and patient be discharged at her request.  She has no clinical signs of ongoing esophageal obstruction or any evidence of aspiration, respiratory distress, stridor or new hypoxemia/new cough.  She is medically stable.    I have reviewed the findings, diagnosis, plan and need for follow up with the patient.    Final diagnoses:   Choking episode   Supplemental oxygen dependent     [facility]  [address] [id number] [phone]    As needed    Johan Huggins, DO  [facility]  [address] [address]  [phone]      As needed      Please eat regular meals and continue to cut up her food in small pieces.  Please return if you develop any new cough, new shortness of breath or fever.  Continue your home oxygen via nasal cannula.    5/31/2023   [facility]        Patient has been assessed and reassessed and at this time based on the information available to me, I feel the patient is medically stable for discharge.  " At times conditions evolve or change,thus repeat medical evaluation is recommended if any additional concerns arise.      The patient is stable for discharge.         Jonathan Merrill MD  05/31/23 2625

## 2023-05-31 NOTE — DISCHARGE INSTRUCTIONS
Please eat regular meals and continue to cut up her food in small pieces.  Please return if you develop any new cough, new shortness of breath or fever.  Continue your home oxygen via nasal cannula.

## 2023-05-31 NOTE — ED TRIAGE NOTES
"Pt presents to [facility] via ambulance. States at about 1630 she was eating steak, had chocked on it, it did pass but she was coughing and coughing. States she couldn't pass water it would \"comeright back up\" Pt called 911 for herself to get checked out, pt states the steak must have passed in the stone time because she can now drink water and feel no pain or pressure.   Pt is still clearing her throat and coughing in triage, pt states it is allergies.,      Triage Assessment       Row Name 05/31/23 8077       Triage Assessment (Adult)    Airway WDL WDL       Respiratory WDL    Respiratory WDL WDL                  "

## 2023-06-01 ENCOUNTER — HOSPITAL ENCOUNTER (OUTPATIENT)
Facility: HOSPITAL | Age: 88
Setting detail: OBSERVATION
Discharge: HOME | End: 2023-06-02
Attending: EMERGENCY MEDICINE | Admitting: INTERNAL MEDICINE
Payer: MEDICARE

## 2023-06-01 ENCOUNTER — APPOINTMENT (OUTPATIENT)
Dept: CT IMAGING | Facility: HOSPITAL | Age: 88
End: 2023-06-01
Attending: EMERGENCY MEDICINE
Payer: MEDICARE

## 2023-06-01 DIAGNOSIS — Z95.0 CARDIAC PACEMAKER IN SITU: ICD-10-CM

## 2023-06-01 DIAGNOSIS — Z99.81 OXYGEN DEPENDENT: ICD-10-CM

## 2023-06-01 DIAGNOSIS — R42 DIZZINESS: ICD-10-CM

## 2023-06-01 DIAGNOSIS — I99.9 VASCULAR DISEASE: ICD-10-CM

## 2023-06-01 DIAGNOSIS — Z91.81 AT RISK FOR FALLS: ICD-10-CM

## 2023-06-01 LAB
ALBUMIN UR-MCNC: 20 MG/DL
ANION GAP SERPL CALCULATED.3IONS-SCNC: 13 MMOL/L (ref 7–15)
APPEARANCE UR: CLEAR
APTT PPP: 27 SECONDS (ref 22–38)
BACTERIA #/AREA URNS HPF: ABNORMAL /HPF
BASOPHILS # BLD AUTO: 0 10E3/UL (ref 0–0.2)
BASOPHILS NFR BLD AUTO: 1 %
BILIRUB UR QL STRIP: NEGATIVE
BUN SERPL-MCNC: 14.4 MG/DL (ref 8–23)
CALCIUM SERPL-MCNC: 9.1 MG/DL (ref 8.8–10.2)
CHLORIDE SERPL-SCNC: 103 MMOL/L (ref 98–107)
CHOLEST SERPL-MCNC: 148 MG/DL
COLOR UR AUTO: ABNORMAL
CREAT SERPL-MCNC: 0.74 MG/DL (ref 0.51–0.95)
DEPRECATED HCO3 PLAS-SCNC: 23 MMOL/L (ref 22–29)
EOSINOPHIL # BLD AUTO: 0.1 10E3/UL (ref 0–0.7)
EOSINOPHIL NFR BLD AUTO: 2 %
ERYTHROCYTE [DISTWIDTH] IN BLOOD BY AUTOMATED COUNT: 13.6 % (ref 10–15)
EST. AVERAGE GLUCOSE BLD GHB EST-MCNC: 123 MG/DL
GFR SERPL CREATININE-BSD FRML MDRD: 77 ML/MIN/1.73M2
GLUCOSE BLDC GLUCOMTR-MCNC: 83 MG/DL (ref 70–99)
GLUCOSE BLDC GLUCOMTR-MCNC: 87 MG/DL (ref 70–99)
GLUCOSE SERPL-MCNC: 100 MG/DL (ref 70–99)
GLUCOSE UR STRIP-MCNC: NEGATIVE MG/DL
HBA1C MFR BLD: 5.9 %
HCT VFR BLD AUTO: 33.6 % (ref 35–47)
HDLC SERPL-MCNC: 59 MG/DL
HGB BLD-MCNC: 11 G/DL (ref 11.7–15.7)
HGB UR QL STRIP: NEGATIVE
HOLD SPECIMEN: NORMAL
HYALINE CASTS: 1 /LPF
IMM GRANULOCYTES # BLD: 0 10E3/UL
IMM GRANULOCYTES NFR BLD: 0 %
INR PPP: 1 (ref 0.85–1.15)
KETONES UR STRIP-MCNC: NEGATIVE MG/DL
LDLC SERPL CALC-MCNC: 64 MG/DL
LEUKOCYTE ESTERASE UR QL STRIP: ABNORMAL
LYMPHOCYTES # BLD AUTO: 1.1 10E3/UL (ref 0.8–5.3)
LYMPHOCYTES NFR BLD AUTO: 17 %
MCH RBC QN AUTO: 30.6 PG (ref 26.5–33)
MCHC RBC AUTO-ENTMCNC: 32.7 G/DL (ref 31.5–36.5)
MCV RBC AUTO: 94 FL (ref 78–100)
MONOCYTES # BLD AUTO: 0.5 10E3/UL (ref 0–1.3)
MONOCYTES NFR BLD AUTO: 8 %
NEUTROPHILS # BLD AUTO: 4.8 10E3/UL (ref 1.6–8.3)
NEUTROPHILS NFR BLD AUTO: 72 %
NITRATE UR QL: NEGATIVE
NONHDLC SERPL-MCNC: 89 MG/DL
NRBC # BLD AUTO: 0 10E3/UL
NRBC BLD AUTO-RTO: 0 /100
PH UR STRIP: 7.5 [PH] (ref 4.7–8)
PLATELET # BLD AUTO: 266 10E3/UL (ref 150–450)
POTASSIUM SERPL-SCNC: 4 MMOL/L (ref 3.4–5.3)
RBC # BLD AUTO: 3.59 10E6/UL (ref 3.8–5.2)
RBC URINE: 1 /HPF
SODIUM SERPL-SCNC: 139 MMOL/L (ref 136–145)
SP GR UR STRIP: 1.01 (ref 1–1.03)
SQUAMOUS EPITHELIAL: 2 /HPF
TRIGL SERPL-MCNC: 126 MG/DL
TROPONIN T SERPL HS-MCNC: 67 NG/L
TROPONIN T SERPL HS-MCNC: 68 NG/L
TROPONIN T SERPL HS-MCNC: 68 NG/L
TROPONIN T SERPL HS-MCNC: 69 NG/L
UROBILINOGEN UR STRIP-MCNC: NORMAL MG/DL
WBC # BLD AUTO: 6.6 10E3/UL (ref 4–11)
WBC URINE: 3 /HPF

## 2023-06-01 PROCEDURE — 99291 CRITICAL CARE FIRST HOUR: CPT | Mod: 25

## 2023-06-01 PROCEDURE — 93010 ELECTROCARDIOGRAM REPORT: CPT | Performed by: INTERNAL MEDICINE

## 2023-06-01 PROCEDURE — 36415 COLL VENOUS BLD VENIPUNCTURE: CPT | Performed by: INTERNAL MEDICINE

## 2023-06-01 PROCEDURE — 80061 LIPID PANEL: CPT | Performed by: INTERNAL MEDICINE

## 2023-06-01 PROCEDURE — 0042T CT HEAD PERFUSION W CONTRAST: CPT

## 2023-06-01 PROCEDURE — 93005 ELECTROCARDIOGRAM TRACING: CPT

## 2023-06-01 PROCEDURE — 84484 ASSAY OF TROPONIN QUANT: CPT | Performed by: INTERNAL MEDICINE

## 2023-06-01 PROCEDURE — G1010 CDSM STANSON: HCPCS

## 2023-06-01 PROCEDURE — 250N000013 HC RX MED GY IP 250 OP 250 PS 637: Performed by: EMERGENCY MEDICINE

## 2023-06-01 PROCEDURE — 99222 1ST HOSP IP/OBS MODERATE 55: CPT | Performed by: INTERNAL MEDICINE

## 2023-06-01 PROCEDURE — 82962 GLUCOSE BLOOD TEST: CPT

## 2023-06-01 PROCEDURE — 250N000011 HC RX IP 250 OP 636: Performed by: RADIOLOGY

## 2023-06-01 PROCEDURE — 85610 PROTHROMBIN TIME: CPT | Performed by: EMERGENCY MEDICINE

## 2023-06-01 PROCEDURE — 250N000013 HC RX MED GY IP 250 OP 250 PS 637: Performed by: INTERNAL MEDICINE

## 2023-06-01 PROCEDURE — 84484 ASSAY OF TROPONIN QUANT: CPT | Performed by: EMERGENCY MEDICINE

## 2023-06-01 PROCEDURE — 81001 URINALYSIS AUTO W/SCOPE: CPT | Performed by: EMERGENCY MEDICINE

## 2023-06-01 PROCEDURE — 120N000001 HC R&B MED SURG/OB

## 2023-06-01 PROCEDURE — 36415 COLL VENOUS BLD VENIPUNCTURE: CPT | Performed by: EMERGENCY MEDICINE

## 2023-06-01 PROCEDURE — 82310 ASSAY OF CALCIUM: CPT | Performed by: EMERGENCY MEDICINE

## 2023-06-01 PROCEDURE — 85730 THROMBOPLASTIN TIME PARTIAL: CPT | Performed by: EMERGENCY MEDICINE

## 2023-06-01 PROCEDURE — 85014 HEMATOCRIT: CPT | Performed by: EMERGENCY MEDICINE

## 2023-06-01 PROCEDURE — 70498 CT ANGIOGRAPHY NECK: CPT | Mod: MG

## 2023-06-01 PROCEDURE — 85049 AUTOMATED PLATELET COUNT: CPT | Performed by: EMERGENCY MEDICINE

## 2023-06-01 PROCEDURE — 87086 URINE CULTURE/COLONY COUNT: CPT | Performed by: EMERGENCY MEDICINE

## 2023-06-01 PROCEDURE — 99291 CRITICAL CARE FIRST HOUR: CPT | Performed by: EMERGENCY MEDICINE

## 2023-06-01 PROCEDURE — 83036 HEMOGLOBIN GLYCOSYLATED A1C: CPT | Performed by: INTERNAL MEDICINE

## 2023-06-01 RX ORDER — MECLIZINE HCL 12.5 MG 12.5 MG/1
12.5 TABLET ORAL ONCE
Status: COMPLETED | OUTPATIENT
Start: 2023-06-01 | End: 2023-06-01

## 2023-06-01 RX ORDER — CLOPIDOGREL BISULFATE 75 MG/1
75 TABLET ORAL ONCE
Status: COMPLETED | OUTPATIENT
Start: 2023-06-01 | End: 2023-06-01

## 2023-06-01 RX ORDER — CETIRIZINE HYDROCHLORIDE 10 MG/1
10 TABLET ORAL DAILY
COMMUNITY

## 2023-06-01 RX ORDER — ASPIRIN 81 MG/1
162 TABLET, CHEWABLE ORAL DAILY
Status: DISCONTINUED | OUTPATIENT
Start: 2023-06-01 | End: 2023-06-01

## 2023-06-01 RX ORDER — ISOSORBIDE MONONITRATE 30 MG/1
30 TABLET, EXTENDED RELEASE ORAL DAILY
Status: DISCONTINUED | OUTPATIENT
Start: 2023-06-01 | End: 2023-06-02 | Stop reason: HOSPADM

## 2023-06-01 RX ORDER — IOPAMIDOL 755 MG/ML
100 INJECTION, SOLUTION INTRAVASCULAR ONCE
Status: COMPLETED | OUTPATIENT
Start: 2023-06-01 | End: 2023-06-01

## 2023-06-01 RX ORDER — CLOPIDOGREL BISULFATE 75 MG/1
75 TABLET ORAL DAILY
Status: DISCONTINUED | OUTPATIENT
Start: 2023-06-02 | End: 2023-06-02 | Stop reason: HOSPADM

## 2023-06-01 RX ORDER — ATORVASTATIN CALCIUM 40 MG/1
40 TABLET, FILM COATED ORAL AT BEDTIME
Status: DISCONTINUED | OUTPATIENT
Start: 2023-06-01 | End: 2023-06-02 | Stop reason: HOSPADM

## 2023-06-01 RX ORDER — ASPIRIN 81 MG/1
81 TABLET ORAL DAILY
Status: DISCONTINUED | OUTPATIENT
Start: 2023-06-02 | End: 2023-06-02 | Stop reason: HOSPADM

## 2023-06-01 RX ORDER — LIDOCAINE 40 MG/G
CREAM TOPICAL
Status: DISCONTINUED | OUTPATIENT
Start: 2023-06-01 | End: 2023-06-02 | Stop reason: HOSPADM

## 2023-06-01 RX ADMIN — IOPAMIDOL 100 ML: 755 INJECTION, SOLUTION INTRAVENOUS at 11:10

## 2023-06-01 RX ADMIN — MECLIZINE 12.5 MG: 12.5 TABLET ORAL at 11:49

## 2023-06-01 RX ADMIN — CLOPIDOGREL BISULFATE 75 MG: 75 TABLET ORAL at 12:52

## 2023-06-01 RX ADMIN — ATORVASTATIN CALCIUM 40 MG: 40 TABLET, FILM COATED ORAL at 21:08

## 2023-06-01 RX ADMIN — ASPIRIN 162 MG: 81 TABLET, CHEWABLE ORAL at 12:52

## 2023-06-01 RX ADMIN — ISOSORBIDE MONONITRATE 30 MG: 30 TABLET, EXTENDED RELEASE ORAL at 15:59

## 2023-06-01 ASSESSMENT — ACTIVITIES OF DAILY LIVING (ADL)
ADLS_ACUITY_SCORE: 20
ADLS_ACUITY_SCORE: 35
FALL_HISTORY_WITHIN_LAST_SIX_MONTHS: NO
ADLS_ACUITY_SCORE: 20
CHANGE_IN_FUNCTIONAL_STATUS_SINCE_ONSET_OF_CURRENT_ILLNESS/INJURY: YES
ADLS_ACUITY_SCORE: 20
ADLS_ACUITY_SCORE: 20
DIFFICULTY_COMMUNICATING: NO
WALKING_OR_CLIMBING_STAIRS_DIFFICULTY: NO
DOING_ERRANDS_INDEPENDENTLY_DIFFICULTY: NO
TOILETING_ISSUES: NO
ADLS_ACUITY_SCORE: 20
ADLS_ACUITY_SCORE: 35
CONCENTRATING,_REMEMBERING_OR_MAKING_DECISIONS_DIFFICULTY: NO
VISION_MANAGEMENT: GLASSES
DRESSING/BATHING_DIFFICULTY: NO
DIFFICULTY_EATING/SWALLOWING: NO
EQUIPMENT_CURRENTLY_USED_AT_HOME: CANE, STRAIGHT;SHOWER CHAIR
HEARING_DIFFICULTY_OR_DEAF: NO
WEAR_GLASSES_OR_BLIND: YES

## 2023-06-01 NOTE — PLAN OF CARE
[facility] Inpatient Admission Note:    Patient admitted to 3114/3114-1 at approximately 14:00 via wheel chair accompanied by daughter in law from emergency room . Report received from Chuck in SBAR format at 13:45 via telephone. Patient transferred to bed via assist of 1 with gait belt. Patient is alert and oriented X 3, denies pain; rates at 0 on 0-10 scale.  Patient oriented to room, unit, hourly rounding, and plan of care. Explained admission packet and patient handbook with patient bill of rights brochure. Will continue to monitor and document as needed.     Inpatient Nursing criteria listed below was met:    Health care directives status obtained and documented: Yes    Patient identifies a surrogate decision maker: Yes If yes, who: Lobo (son) and [place] Contact Information: [phone] home phone      If initial lactic acid greater than 2.0, repeat lactic acid drawn within one hour of arrival to unit: NA. If no, state reason: N/A    Clergy visit ordered if patient requests: Pt denies at this time.    Skin issues/needs documented: Yes    Isolation Patient: no Education given, correct sign in place and documentation row added to PCS:  Yes    Fall Prevention Yes: Care plan updated, education given and documented, sticker and magnet in place: Yes    Care Plan initiated: Yes    Education Documented (including assessment): Yes    Patient has discharge needs : Unknown discharge needs at this time

## 2023-06-01 NOTE — CONSULTS
[facility]    Stroke Telephone Note    I was called by Jonathan Merrill on 06/01/23 regarding patient Frances Gilliam. The patient is a 89 year old female with PMHx significant for macular degeneration, CAD s/p stenting, GIB, HTN, HLD, PVD, known stenosis of bilateral carotid arteries, pacemaker (MRI compatible). She went to bed at her baseline last night around 2300 and woke up this morning around 0700 with dizziness. She is unable to ambulate without assistance.       Stroke Code Data (for stroke code without tele)  Stroke code activated 06/01/23   1053   Stroke provider first response  06/01/23   1056            Last known normal 05/31/23   2300        Time of discovery   (or onset of symptoms) 06/01/23   0700   Head CT read by Stroke Neuro Dr/Provider 06/01/23   1149   Was stroke code de-escalated? Yes 06/01/23 1149          Imaging Findings   CT   No acute intracranial hemorrhage.  No acute fracture.     CTA  80% stenoses in both carotid bifurcations. No intracranial stenoses or occlusions are noted    CTP  No areas of cerebral blood flow less than 30% are noted.  There are no areas of T max of greater than 6 seconds.    Intravenous Thrombolysis  Not given due to:   - unclear or unfavorable risk-benefit profile for extended window thrombolysis beyond the conventional 4.5 hour time window    Endovascular Treatment  Not initiated due to absence of proximal vessel occlusion    Impression  Dizziness: stroke vs peripheral vertigo     Recommendations   - MRI w/ and w/out contrast, to include coronal DWI and thin cuts through the brainstem   - pt has MRI compatible pacemaker, waiting on return phone call to see if MRI would be able to scan her   - if unable to get MRI, get a repeat non-contrast head CT tomorrow to look for interval development of stroke  - continue PTA aspirin 81 mg and atorvastatin 40 mg    - plavix is also on her med list but not sure if it's current; if she is on plavix, continue  "same     My recommendations are based on the information provided over the phone by Frances Gilliam's in-person providers. They are not intended to replace the clinical judgment of her in-person providers. I was not requested to personally see or examine the patient at this time.    Roseline Torres, NP  Vascular Neurology    To page me or covering stroke neurology team member, click here: [facility]  Choose \"On Call\" tab at top, then select \"NEUROLOGY/ALL SITES\" from middle drop-down box, press Enter, then look for \"stroke\" or \"telestroke\" for your site.        "

## 2023-06-01 NOTE — MEDICATION SCRIBE - ADMISSION MEDICATION HISTORY
Medication Scribe Admission Medication History    Admission medication history is complete. The information provided in this note is only as accurate as the sources available at the time of the update.    Medication reconciliation/reorder completed by provider prior to medication history? Yes    Information Source(s): Patient, Baylee/David and Nathan Song via in-person    Pertinent Information: reports she took no medications today. No rx hx of breo in dispense hx. Pt reports taking every day.      Changes made to PTA medication list:    Added: None    Deleted: iron- reports primary told her to stop taking due to elevated iron levels approx 1 month ago.     Changed: None    Medication Affordability:  Not including over the counter (OTC) medications, was there a time in the past 3 months when you did not take your medications as prescribed because of cost?: No    Allergies reviewed with patient and updates made in EHR: yes    Medication History Completed By: Marcia Arnett 6/1/2023 2:35 PM    Prior to Admission medications    Medication Sig Last Dose Taking? Auth Provider Long Term End Date   amLODIPine (NORVASC) 10 MG tablet Take 10 mg by mouth every morning 5/31/2023 at 08:00 Yes Reported, Patient Yes    aspirin 81 MG EC tablet Take 81 mg by mouth every morning 5/31/2023 at 08:00 Yes Reported, Patient     atorvastatin (LIPITOR) 40 MG tablet Take 40 mg by mouth At Bedtime 5/31/2023 at HS Yes Reported, Patient Yes    Calcium Carb-Cholecalciferol (CALCIUM PLUS VITAMIN D3 PO) Take 1 tablet by mouth every morning 5/31/2023 at AM Yes Reported, Patient     cetirizine (ZYRTEC) 10 MG tablet Take 10 mg by mouth daily 5/31/2023 at afternoon Yes Reported, Patient     clopidogrel (PLAVIX) 75 MG tablet Take 75 mg by mouth every morning 5/31/2023 at AM Yes Reported, Patient Yes    cyanocobalamin (VITAMIN B-12) 100 MCG tablet Take 1 tablet by mouth every morning 5/31/2023 at AM Yes Reported, Patient      fluticasone-vilanterol (BREO ELLIPTA) 100-25 MCG/INH inhaler Inhale 1 puff into the lungs every morning 5/31/2023 at AM Yes Reported, Patient     isosorbide mononitrate (IMDUR) 30 MG 24 hr tablet Take 30 mg by mouth every morning 5/31/2023 at AM Yes Reported, Patient Yes    losartan (COZAAR) 25 MG tablet Take 12.5 mg by mouth At Bedtime 5/31/2023 at HS Yes Reported, Patient Yes    metoprolol succinate ER (TOPROL XL) 25 MG 24 hr tablet Take 25 mg by mouth every morning 5/31/2023 at 08:00 Yes Reported, Patient Yes    Multiple Vitamins-Minerals (ICAPS PO) Take 1 capsule by mouth every morning 5/31/2023 at AM Yes Reported, Patient

## 2023-06-01 NOTE — ED PROVIDER NOTES
History     Chief Complaint   Patient presents with     Dizziness   Expand All Collapse All                        Woke up dizzy this am. Dizziness with movement of head. Denies sob or chest pain. Ears cleaned on 22nd was told she had fluid in left ear. Pacemaker check recently and that was good.      Chuck Martinez, RN   Registered Nurse  Nursing  ED Notes      Signed  Date of Service:  6/1/2023 10:16 AM  Creation Time:  6/1/2023 10:16 AM               Pt reports awaking around 0700 and feeling like she was spinning. Pt states that she has had this before but never this bad. Pt also had a UTI 5/22 and just finished antibiotics. Pt also just started zyrtec about 4 days ago.          ED on 6/1/2023     ED on 6/1/2023        Detailed Report      Note shared with patient        HPI  Frances Gilliam is a 89 year old female who presents  from home.  She is chronically on home oxygen and presents with above symptoms.    Upon My assessment, the patient notes last night when she went bed 11 PM she felt normal.  Sometime overnight she woke up and felt dizzy but good to get to the bathroom and then at 7 AM this morning when she tried to get a bed she was dizzy to the point where she cannot walk.  Family brought her to the emergency room in the wheelchair as the patient has unsteady this and is no longer able to walk independently.  Patient denies any headache, double vision, vision changes, neck pain, jaw pain, chest pain, palpitations, bloody or black stools.  She denies any arm or leg numbness ting or weakness.  She had no confusion or speech changes.  She denies any tinnitus or acute hearing loss.    Patient is chronically oxygen dependent and is chronically on 1 Litre oxygen at home and lives independently.  Patient reports mild chronic runny nose and mild chronic nonproductive cough.  Patient denies any falls or trauma.  She denies any ear discharge.    Patient was treated for UTI in the last week and her symptoms  have abated.  She is on Zyrtec for nasal congestion.    Patient was seen at this emergency room yesterday after she choked on a small piece of steak.  She was not acutely hypoxic she was talkative and polite without signs of esophageal obstruction or aspiration.  She is observed and discharged home with her family and notes she was not dizzy when she left and was not dizzy yesterday.    Patient is on aspirin and Plavix and she has not had them yet today.  She did take her medications yesterday.    Allergies:  Allergies   Allergen Reactions     Niacin Rash       Problem List:    Patient Active Problem List    Diagnosis Date Noted     Cardiac pacemaker in situ 06/01/2023     Priority: Medium     Dizziness 06/01/2023     Priority: Medium     Vascular disease 06/01/2023     Priority: Medium     Oxygen dependent 06/01/2023     Priority: Medium     At risk for falls 06/01/2023     Priority: Medium     Pulmonary hypertension (H) 11/25/2022     Priority: Medium     Hypoxia 11/25/2022     Priority: Medium     LBBB (left bundle branch block) 11/25/2022     Priority: Medium     Generalized muscle weakness 11/25/2022     Priority: Medium     Pacemaker 02/16/2021     Priority: Medium     Formatting of this note might be different from the original.  Second degree HB, syncope. Dr. Mohr implanted Pacemaker 2/14/2021  Device Information:   Generator: BS Accolade DR TERRAZAS EL [id number] (SN: [id number] - Feb, 14 2021)  RA Lead: BS Ingevity+ [id number] (SN: [id number] - Feb, 14 2021)   RV Lead: BS Ingevity+ [id number] (SN: [id number] - Feb, 14 2021) RV basal septum       Aneurysm of infrarenal abdominal aorta (H) 02/13/2021     Priority: Medium     Bilateral carotid artery stenosis 02/04/2020     Priority: Medium     Other hyperlipidemia 02/04/2020     Priority: Medium     Coronary artery disease of native artery of native heart with stable angina pectoris (H) 02/04/2020     Priority: Medium     Formatting of this note might be different from the  original.  99% stenosis to 1st RPL treated w/ PTCA, too small to stent.    Residual 50% LAD stenosis FFR pre 0.90, post 0.89, not stented       Essential hypertension 02/04/2020     Priority: Medium     Coronary atherosclerosis 12/21/2019     Priority: Medium     Disorder of bone and cartilage 12/21/2019     Priority: Medium     Hyperlipidemia 12/21/2019     Priority: Medium     Hypertension 12/21/2019     Priority: Medium     Macular degeneration 12/21/2019     Priority: Medium     Occlusion and stenosis of carotid artery without mention of cerebral infarction 12/21/2019     Priority: Medium     Overview:   with 50-75% stenoses of left, right internal carotid arteries, documented by   US 5/15/02 and 06/16/04       Closed fracture of multiple ribs of left side 12/21/2019     Priority: Medium     Syncope 12/21/2019     Priority: Medium     Multiple rib fractures 12/21/2019     Priority: Medium     Vitamin D deficiency      Priority: Medium     Vitamin B 12 deficiency      Priority: Medium     Peripheral vascular disease (H)      Priority: Medium     Iron deficiency anemia      Priority: Medium     History of MI (myocardial infarction)      Priority: Medium     GI hemorrhage      Priority: Medium     Essential thrombocytosis (H)      Priority: Medium     CAD (coronary artery disease)      Priority: Medium     Aortic valve sclerosis      Priority: Medium     Anemia 05/12/2016     Priority: Medium     Peptic ulcer disease 11/12/2014     Priority: Medium     Anxiety about health 11/04/2014     Priority: Medium     Panic disorder 11/04/2014     Priority: Medium     Asthma 10/11/2011     Priority: Medium     Mild intermittent asthma, uncomplicated 10/11/2011     Priority: Medium     Lumbago 09/06/2006     Priority: Medium     IMO Update 10/11       Sacroiliitis, not elsewhere classified (H) 09/06/2006     Priority: Medium     IMO Update 10/11          Past Medical History:    Past Medical History:   Diagnosis Date      "Age-related macular degeneration      Aortic valve sclerosis      CAD (coronary artery disease)      Essential thrombocytosis (H)      GI hemorrhage      History of MI (myocardial infarction)      Hypertension      Iron deficiency anemia      Mixed hyperlipidemia      Peripheral vascular disease (H)      Vitamin B 12 deficiency      Vitamin D deficiency        Past Surgical History:    Past Surgical History:   Procedure Laterality Date     ESOPHAGOSCOPY, GASTROSCOPY, DUODENOSCOPY (EGD), COMBINED N/A 2017    Procedure: COMBINED ESOPHAGOSCOPY, GASTROSCOPY, DUODENOSCOPY (EGD);  UPPER ENDOSCOPY WITH BIOPSIES, ENTEROSCOPY WITH  SMALL BOWEL ARGON PLASMA COAGULATION;  Surgeon: Robin Vaughn MD;  Location: [facility]     [place] PATIENT HAS A CORONARY ARTERY STENT         Family History:    Family History   Problem Relation Age of Onset     Heart Disease Father        Social History:  Marital Status:   [5]  Social History     Tobacco Use     Smoking status: Former     Types: Cigarettes     Quit date:      Years since quittin.4     Smokeless tobacco: Never   Substance Use Topics     Alcohol use: Yes     Comment: occasionally     Drug use: Never        Medications:    amLODIPine (NORVASC) 10 MG tablet  aspirin 81 MG EC tablet  atorvastatin (LIPITOR) 40 MG tablet  Calcium Carb-Cholecalciferol (CALCIUM PLUS VITAMIN D3 PO)  clopidogrel (PLAVIX) 75 MG tablet  cyanocobalamin (VITAMIN B-12) 100 MCG tablet  ferrous sulfate (IRON) 325 (65 FE) MG tablet  fluticasone-vilanterol (BREO ELLIPTA) 100-25 MCG/INH inhaler  isosorbide mononitrate (IMDUR) 30 MG 24 hr tablet  losartan (COZAAR) 25 MG tablet  metoprolol succinate ER (TOPROL XL) 25 MG 24 hr tablet  Multiple Vitamins-Minerals (ICAPS PO)          Review of Systems  Per HPI.  Physical Exam   BP: 163/68  Pulse: 74  Temp: 97.4  F (36.3  C)  Resp: 16  Height: 149.9 cm (4' 11\")  Weight: 52.6 kg (116 lb)  SpO2: 93 %      Physical Exam  Nursing note and vitals " reviewed.  Constitutional: The patient appears well-developed.  She is watching television and speaks with a slight nasal quality.  She is on 1 L nasal can oxygen and speaks in full sentences.  HENT:   Mouth/Throat: Oropharynx is clear and moist.        Normal inspection   Eyes: Pupils are equal, round, and reactive to light.   Neck: Trachea normal. Neck supple. No rigidity. No tracheal deviation present.   Mastoids nontender.  Ears moderate cerumen left canal.  No discharge.  Right TM is visualized is intact.  Cardiovascular: Normal rate, regular rhythm, normal heart sounds and intact distal pulses.  Icemaker and chest.  No murmur heard.  Pulmonary/Chest: Effort normal and breath sounds normal. No stridor. No respiratory distress.   Abdominal: Soft. Bowel sounds are normal. The patient exhibits no pulsatile midline mass. There is no tenderness.   Musculoskeletal: Normal range of motion.        No signs of swelling.   Neurological: The patient is alert.  Symmetric smile fluent speech no focal arm or leg weakness or drift.  Cranial nerves II through VII intact without nystagmus.  She is fully oriented.  Romberg testing is positive she is able to stand but closing her eyes she sways and falls back to the bed.  NIH stroke scale is 0 and visual fields are intact.  Skin: Skin is warm and dry. No rash noted.   Psychiatric: The patient's behavior is normal.    ED Course              ED Course as of 06/01/23 1234   Thu Jun 01, 2023   1039 Triage note reviewed.  Patient is on home oxygen.  Vitals are incomplete will require full set of vitals.   1046 M completed.  Patient reports she was last normal when she bed at [place] p.m. last night.  She woke up over the night and was dizzy when she woke up but not sure what time.  Her family that she presents with today notes she is not able to walk independently and prior to today could walk independently.  They brought her in from home via wheelchair.  Patient was able to get to the  bathroom independently sometime after going to bed but notes to me she was dizzy when she got up to go to the bathroom sometime early this morning.    Patient denies any headache, double vision, hearing loss, ear discharge.  She has recently completed antibiotics for UTI and was placed on Zyrtec 4 days ago.  Patient is chronically on home oxygen at 1 L.  She also was seen here yesterday after she choked on a small piece of steak, was able to do take fluids well and was discharged home on her baseline oxygen without complaints or dizziness today stable gait.   1048 Patient has dizziness and unable to walk.  She has no nystagmus symmetric smile no arm or leg drift she has intact finger-nose examination.  Differential includes central or peripheral vertigo.  She is not a candidate for IV tPA based on the timing of her symptoms but her symptoms are similar between 12 hours duration and potentially as few as 8 hours that she think she got up around 2 AM with dizziness.   1051 ED RN will check blood sugar and confirm full vitals.  I discussed the case with the patient and her care team.  We will proceed with imaging to exclude bleed, serious vascular pathology such as vertebral dissection although my suspicion for such is low.  If advanced CT CTA is negative for serious pathology MRI may be indicated.   1100 STroke consult-Sol coyle the case.  We discussed imaging and approach.  At this time we will proceed with tier 2 stroke.  Patient does have a pacemaker which makes it potentially difficult to obtain an MRI.   1108 Glucose is normal 87.   1108 EKG: Normal sinus rhythm.  First-degree AV block.  Left bundle branch block.  No ST segment changes.  Of note patient has no chest pain neck pain or jaw pain.  EKG at 10:55 AM my read   1155 CT head / CTA head/ neck with bilateral chronic bilateral stenosis approx 70%    Will get MRI for definitive dx.  Discussed with Sol Correia, Stroke neuro.      1215 Neurologist recommends  ongoing ASA, await potential MRI for further recommendations.  IT is not clear if MRI can be completed today and /or if pacemaker is compatible.    1221 Patient has been reassessed by myself.  She is able to sit up on the bed but when she stands she is unsteady.  In performing Romberg testing when she closes her eyes she sways and falls towards the bed.  She is not very safe to go home.  Patient has had aspirin here today.  She has not had her Plavix today.  We will give her oral Plavix and I discussed her fall risk and symptoms and my recommendations for admission for at least observation and consideration of completion of MRI as inpatient.  She is not appropriate to be discharged at this time due to her dizziness and unsteadiness and fall risk.  She is not a candidate for IV tPA or intra-arterial thrombectomy and she has no signs of new large vessel occlusion.   1223 Dr. Faith hospitalist and I discussed case, admit to tele     Procedures         EKG; dr Merrill read    Critical Care time:  55 minutes, tier 2 stroke evaluation.      Results for orders placed or performed during the hospital encounter of 06/01/23 (from the past 24 hour(s))   UA with Microscopic reflex to Culture    Specimen: Urine, Midstream   Result Value Ref Range    Color Urine Straw Colorless, Straw, Light Yellow, Yellow    Appearance Urine Clear Clear    Glucose Urine Negative Negative mg/dL    Bilirubin Urine Negative Negative    Ketones Urine Negative Negative mg/dL    Specific Gravity Urine 1.010 1.003 - 1.035    Blood Urine Negative Negative    pH Urine 7.5 4.7 - 8.0    Protein Albumin Urine 20 (A) Negative mg/dL    Urobilinogen Urine Normal Normal, 2.0 mg/dL    Nitrite Urine Negative Negative    Leukocyte Esterase Urine Moderate (A) Negative    Bacteria Urine Few (A) None Seen /HPF    RBC Urine 1 <=2 /HPF    WBC Urine 3 <=5 /HPF    Squamous Epithelials Urine 2 (H) <=1 /HPF    Hyaline Casts Urine 1 <=2 /LPF    Narrative    Urine  Culture ordered based on laboratory criteria   [place] Draw    Narrative    The following orders were created for panel order [place] Draw.  Procedure                               Abnormality         Status                     ---------                               -----------         ------                     Extra Blue Top Tube[[id number]]                              Final result               Extra Red Top Tube[[id number]]                               Final result               Extra Green Top (Lithium...[[id number]]                      Final result               Extra Purple Top Tube[[id number]]                            Final result                 Please view results for these tests on the individual orders.   Extra Blue Top Tube   Result Value Ref Range    Hold Specimen JIC    Extra Red Top Tube   Result Value Ref Range    Hold Specimen JIC    Extra Green Top (Lithium Heparin) Tube   Result Value Ref Range    Hold Specimen JIC    Extra Purple Top Tube   Result Value Ref Range    Hold Specimen JIC    CBC with Platelets & Differential    Narrative    The following orders were created for panel order CBC with Platelets & Differential.  Procedure                               Abnormality         Status                     ---------                               -----------         ------                     CBC with platelets and d...[[id number]]  Abnormal            Final result                 Please view results for these tests on the individual orders.   Basic metabolic panel   Result Value Ref Range    Sodium 139 136 - 145 mmol/L    Potassium 4.0 3.4 - 5.3 mmol/L    Chloride 103 98 - 107 mmol/L    Carbon Dioxide (CO2) 23 22 - 29 mmol/L    Anion Gap 13 7 - 15 mmol/L    Urea Nitrogen 14.4 8.0 - 23.0 mg/dL    Creatinine 0.74 0.51 - 0.95 mg/dL    Calcium 9.1 8.8 - 10.2 mg/dL    Glucose 100 (H) 70 - 99 mg/dL    GFR Estimate 77 >60 mL/min/1.73m2   INR   Result Value Ref Range    INR 1.00 0.85 - 1.15   Partial  thromboplastin time   Result Value Ref Range    aPTT 27 22 - 38 Seconds   Troponin T, High Sensitivity   Result Value Ref Range    Troponin T, High Sensitivity 68 (H) <=14 ng/L   CBC with platelets and differential   Result Value Ref Range    WBC Count 6.6 4.0 - 11.0 10e3/uL    RBC Count 3.59 (L) 3.80 - 5.20 10e6/uL    Hemoglobin 11.0 (L) 11.7 - 15.7 g/dL    Hematocrit 33.6 (L) 35.0 - 47.0 %    MCV 94 78 - 100 fL    MCH 30.6 26.5 - 33.0 pg    MCHC 32.7 31.5 - 36.5 g/dL    RDW 13.6 10.0 - 15.0 %    Platelet Count 266 150 - 450 10e3/uL    % Neutrophils 72 %    % Lymphocytes 17 %    % Monocytes 8 %    % Eosinophils 2 %    % Basophils 1 %    % Immature Granulocytes 0 %    NRBCs per 100 WBC 0 <1 /100    Absolute Neutrophils 4.8 1.6 - 8.3 10e3/uL    Absolute Lymphocytes 1.1 0.8 - 5.3 10e3/uL    Absolute Monocytes 0.5 0.0 - 1.3 10e3/uL    Absolute Eosinophils 0.1 0.0 - 0.7 10e3/uL    Absolute Basophils 0.0 0.0 - 0.2 10e3/uL    Absolute Immature Granulocytes 0.0 <=0.4 10e3/uL    Absolute NRBCs 0.0 10e3/uL   Glucose by meter   Result Value Ref Range    GLUCOSE BY METER POCT 87 70 - 99 mg/dL   CT Head w/o Contrast    Narrative    PROCEDURE: CT HEAD W/O CONTRAST   6/1/2023 11:36 AM    HISTORY:Female, age,  89 years, , , Code Stroke to evaluate for  potential thrombolysis and thrombectomy. PLEASE READ IMMEDIATELY.    COMPARISON: 12/21/2019    TECHNIQUE: CT of the brain without contrast. Axial; sagittal and  coronal reconstructed images were reviewed.    FINDINGS: Ventricles and sulci demonstrate changes consistent with  atrophy. Gray and white matter demonstrate scattered areas of  decreased density.    There is no evidence of mass, mass effect or midline shift. No  evidence of acute hemorrhage. Large mucous retention cysts again seen  in the left maxillary sinus.    No acute fracture.       Impression    IMPRESSION:   No acute intracranial hemorrhage.  No acute fracture.     Results were discussed with Dr. Merrill at 1138  hours 6/1/2023.    This facility minimizes radiation dose by adjusting the mA and/or kV  according to each patient size.    This CT scan was performed using one or more the following dose  reduction techniques:    -Automated exposure control,  -Adjustment of the mA and/or kV according to patient's size, and/or,  -Use of iterative reconstruction technique.      BEHZAD ANTHONY MD         SYSTEM ID:  [id number]   CT Head Perfusion w Contrast    Narrative    CT perfusion of the brain.    Technique A CT perfusion of the brain was performed.    HISTORY: Neurologic deficit    FINDINGS: No areas of cerebral blood flow less than 30% are noted.  There are no areas of T max of greater than 6 seconds.    SHAAN BAZZI MD         SYSTEM ID:  [id number]   CTA Head Neck with Contrast    Narrative    PROCEDURE: CTA HEAD NECK W CONTRAST 6/1/2023 11:51 AM    HISTORY: Code Stroke to evaluate for potential thrombolysis and  thrombectomy. PLEASE READ IMMEDIATELY.    COMPARISONS: None.    Meds/Dose Given: Isovue 370    TECHNIQUE: CT angiogram of the neck and CT angiogram of the brain with  sagittal and coronal mip reconstructions    FINDINGS: CT angiogram of the neck: The brachiocephalic artery is  widely patent. There is atherosclerotic plaquing seen in this right  subclavian artery. There is calcific plaquing in the proximal right  vertebral artery. Right vertebral artery has some diffuse calcific  plaquing to the base of the brain but no hemodynamically significant  stenosis is noted.    There is an 80% stenosis seen at the right carotid bifurcation. Beyond  the bifurcation of the internal carotid arteries widely patent to the  skull base.    There is heavy calcific plaquing of the left carotid bifurcation with  an 80% stenosis noted in the proximal internal carotid artery. Beyond  the bifurcation the internal carotid artery is widely patent to the  skull base. There is calcific plaquing seen in the left subclavian  artery. There is  calcific plaquing at the origin of the left vertebral  artery and along the course of the left vertebral to the skull base.  No hemodynamically significant stenoses are noted.    The angiogram of the brain: The distal vertebral and basilar arteries  are widely patent. Superior cerebellar arteries are widely patent. The  left posterior cerebral artery is predominantly filled via the  posterior communicating artery. The right posterior cerebral artery is  predominantly filled via the basilar artery.    There is calcific plaquing in the carotid siphons. The supraclinoid  internal carotid artery appears normal. Both anterior cerebral  arteries are widely patent. The middle cerebral arteries and  trifurcation vessels are widely patent..    The muscles of mastication and parapharyngeal spaces are normal. The  larynx and upper trachea is normal. There is a low-density mass  inseparable from the right lobe of the thyroid possibly a large  thyroid cyst ultrasound of the thyroid gland is recommended. The  cervical and supraclavicular lymph nodes are normal. The lung apices  are clear         Impression    IMPRESSION: 80% stenoses in both carotid bifurcations. No intracranial  stenoses or occlusions are noted    SHAAN ABZZI MD         SYSTEM ID:  [id number]       Medications   aspirin (ASA) chewable tablet 162 mg (has no administration in time range)   clopidogrel (PLAVIX) tablet 75 mg (has no administration in time range)   meclizine (ANTIVERT) tablet 12.5 mg (12.5 mg Oral $Given 6/1/23 1149)   iopamidol (ISOVUE-370) solution 100 mL (100 mLs Intravenous $Given 6/1/23 1110)   sodium chloride (PF) 0.9% PF flush 150 mL (100 mLs Intravenous $Given 6/1/23 1110)       Assessments & Plan (with Medical Decision Making)     I immediately assessed the patient upon arrival and notes she is acutely dizzy bleeding sometime after 11 PM on 5/31/2023.  She was clearly symptomatic overnight and not able to walk this morning at 7 AM  independently which is a new problem for the patient.  Patient is chronically on oxygen at 1 L and has no signs of respiratory stress, chest pain or new cough.  She does some mild nasal congestion is currently on Zyrtec.    Patient's CT head was immediately obtained noncontrast and negative for bleed.  Patient underwent noncontrast head CT, CTA head and neck with contrast and perfusion.  I consulted the stroke neurologist and we had ongoing discussions including use of aspirin and Plavix.  We discussed obtaining MRI for definitive diagnosis and whether or not the pacemaker is compatible for MRI.  There is ongoing discussion about trying to obtain an MRI if possible.  The MRI tech is currently not available to discuss.    Patient is been reassessed she is still symptomatic despite Antivert, her aspirin and Plavix.  As she is unsteady and not able to walk independently she will be admitted for neuro observation, telemetry and potential further work-up or imaging.  She has been given Antivert here and has no headache or photophobia.  There is no indication for lumbar puncture.  Patient's initial troponin is in the indeterminate range.  She has no chest pain or other suggestion of anginal equivalent.  A delta troponin is ordered.    I have spoke to the hospitalist who excepted admission to a telemetry bed.  The stroke neurologist is still coordinating with MRI the potential capacity to obtain MRI today.      Disposition: admit to tele bed, Dr. Combs accepts after MD to MD report.      Final diagnoses:   Dizziness   At risk for falls   Oxygen dependent   Cardiac pacemaker in situ   Vascular disease       6/1/2023   [facility]     Jonathan Merrill MD  06/01/23 6207

## 2023-06-01 NOTE — ED NOTES
Pt reports awaking around 0700 and feeling like she was spinning. Pt states that she has had this before but never this bad. Pt also had a UTI 5/22 and just finished antibiotics. Pt also just started zyrtec about 4 days ago.

## 2023-06-01 NOTE — PROGRESS NOTES
06/01/23 1400   Appointment Info   Signing Clinician's Name / Credentials (PT) Theodore Hartley DPT   Appointment Canceled Reason (PT) Medical status   Appointment Cancel Comments (PT) PT eval orders received and chart review completed and eval held per clinical judgment as pt awaiting repeat head CT versus MRI regarding her dizziness as neurology consult considering stroke vs peripheral vertigo.  Will check on pt status tomorrow and eval as appropriate.

## 2023-06-01 NOTE — ED NOTES
Delay in Admission due to hospitalist (Dr. Combs) asking for patient to not be assigned to a bed due to waiting for Neuro to call back. Acute care will be calling this supervisor when we have an answer.     Nathaly Govea, RN, BSN, PHN on 6/1/2023 at 12:59 PM

## 2023-06-01 NOTE — H&P
Admitted: 06/01/2023    PRIMARY CARE PHYSICIAN:  Dr. Johan Huggins    CHIEF COMPLAINT:  Dizziness.    HISTORY OF PRESENTING ILLNESS:  The patient is an 89-year-old female with a history of coronary artery disease, MI, status post stent placement x3, pulmonary hypertension, COPD, on chronic oxygen at 1 L, who presents today due to dizziness.    It is noted that the patient is on oxygen at 1 L chronically.  Her last PFT was done in 2019 with a FEV1 of 40%.  The patient lives at home in her apartment and uses a walker.    It is noted that the patient did present to our emergency department yesterday with reports of choking on some steak.  Once she got to the emergency department, she was stable and discharged home without any symptoms.  She states she felt fine last evening; however, woke up this morning feeling quite dizzy.  She reports that she tried to ambulate with the use of her cane; however, was unable to do so.  She denies any associated falls.  The dizziness has persisted, however, improved a bit since she arrived in the emergency department today.  She denies any unilateral weakness, chest pain, palpitations, shortness of breath, cough, abdominal pain, nausea, vomiting or dysuria.    In the emergency department, she did undergo routine evaluation including routine labs, which were fairly unremarkable along with a urinalysis, which was negative.  Additional imaging did include a CT of the head without contrast showing no acute intracranial hemorrhage.  Subsequent CTA of the neck and head showed 80% stenosis of both carotid bifurcations.  CTA of the brain did not show any areas with blood flow less than 30%.    In the emergency department, the patient was evaluated through telestroke with the recommendation that she continue on her aspirin 81 mg daily along with her Plavix.  Due to the negative imaging studies as stated, it is recommended that she has a repeat CT scan tomorrow.  In addition, physical  therapy will be consulted for assessment as to ambulation and vertigo.    PAST MEDICAL HISTORY:    1.  History of MI, status post stent placement x3.   2.  Coronary artery disease.  3.  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  4.  Pulmonary hypertension.  5.  COPD.  6.  History of a left bundle branch block.  7.  Diastolic CHF.  8.  Chronic oxygen use at 1 L.  9.  Hyperlipidemia.  10.  Hypertension.  11.  Peptic ulcer disease with associated GI bleed over 10 years ago.    HOME MEDICATIONS:    1.  Aspirin 81 mg daily.  2.  Plavix 75 mg daily.  3.  Lipitor 40 mg daily.  4.  Norvasc 10 mg daily.  5.  Calcium plus vitamin D 1 tablet twice daily.  6.  Vitamin B12 daily.  7.  Iron 325 daily with breakfast.  8.  Breo Ellipta 10/25 one puff in the lungs daily.  9.  Imdur 30 mg daily.  10.  Losartan 25 mg daily.  11.  Metoprolol succinate ER 25 mg daily.  12.  Multivitamin daily.    ALLERGIES:  NIACIN.    PAST SURGICAL HISTORY:   1.  Remarkable for stent placement x3.  2.  EGD.    SOCIAL HISTORY:  The patient lives in her apartment alone.  The patient does have a distant history of smoking; however, quit 30 some years ago.    FAMILY HISTORY:  Remarkable for father who had coronary artery disease.  The patient is one of 9 siblings, all of whom have passed, most related to underlying coronary artery disease.    REVIEW OF SYSTEMS:  A 10-point review of systems was obtained.  Pertinent positives are included in the HPI.  All other systems reviewed and found to be negative at this time.      PHYSICAL EXAMINATION:  VITAL SIGNS:  Blood pressure is 162/60, heart rate 75, temp 98.6, respirations 20, O2 sat 86% on 1 L nasal cannula.  GENERAL:  The patient is alert and oriented and pleasant.  HEENT:  Pupils were equal bilaterally.  Oropharynx clear without exudate or inflammation.  NECK:  Supple with no cervical lymph nodes or thyromegaly.  The patient does have bilateral carotid bruits noted, right being greater  than left.  HEART:  Regular rate and rhythm without any murmurs.  LUNGS:  Clear to auscultation bilaterally.  ABDOMEN:  Soft with positive bowel sounds.  EXTREMITIES:  Without any edema.  NEUROLOGIC:  Cranial nerves II-XII were intact.  The patient's arm strength was 4-5/5 bilaterally.  Leg strength 4-5/5 bilaterally.  The patient had no lateralizing deficits or facial droop noted.    LABORATORY/TEST DATA:  As reported above, CTA of the head without contrast without any bleed, CT of the neck did show about 80% stenosis of the carotids bilaterally and CT of the head did not show any flow-limiting lesions less than 30%.  Urinalysis was fairly bland; however, she did have some moderate leukocyte esterase and a few bacteria.  CBC with a white count of 6.6, hemoglobin 11 and platelets of 266.  BMP was unremarkable.  LDL was 64 with an HDL of 59 and triglycerides 126.  Troponin was noted for elevation at 68 times 2.  EKG was without any acute findings.    ASSESSMENT AND PLAN:  The patient is an 89-year-old female with an extensive past medical history, inclusive of coronary artery disease, status post stenting, MI, COPD, hypertension, hyperlipidemia, peripheral vascular disease, pacemaker placement for second-degree heart block, pulmonary hypertension, chronic hypoxia requiring 1 L nasal cannula, left bundle branch block pattern and chronic diastolic heart failure, who presented to the emergency department today after being seen yesterday with complaints of dizziness.  Yesterday's presentation to the emergency department appears unrelated to this event as she felt fine yesterday evening and awoke with her symptoms this morning.  1.  Neurologic:  With her evaluation and imaging as outlined above, there did not appear to be any acute findings consistent with either hemorrhagic or thrombotic stroke.  The patient will continue on her aspirin and Plavix at this time.  In addition, a repeat head CT along with neuro checks will  be in place, along with PT, OT assessment for her dizziness and the possibility of vertigo.  2.  Cardiac:  The patient's cardiac status appears stable at this time despite her history of pacemaker placement, coronary artery disease, MI and stent placement.  She denies any chest pain.  There is no indication of overt ACS; however, she does have a slight elevation in her troponin at 68, hence, we will continue to trend these.  Echocardiogram has also been requested.  The patient's outpatient blood pressure medications have been held at this time; however, she will continue her Imdur along with the aspirin and Plavix as stated.    3.  Pulmonary:  As stated, the patient does have a history of underlying COPD, which is oxygen dependent at approximately 1 L nasal cannula.  The patient denies any respiratory complaints at this time.  We will continue to monitor.    4.  Renal:  The patient's renal status appears stable at this time.  5.  GI:  The patient does have a distant history of GI bleed in the context of Plavix use; however, she denies any abdominal pain and there is no active bleeding and/or significant anemia identified.  6.  Fluid, electrolytes and nutrition:  The patient will be placed on a full liquid diet at this time..  7.  Ambulation only with assist and PT, OT.  8.  DVT prophylaxis in the way of pneumatic compression boots.  9.  Code status was discussed with the patient and she will remain full code.    DISPOSITION:  Hopeful in 1-2 days; thus, dependent upon clinical course.    Ant Combs DO        D: 2023   T: 2023   MT: jaden    Name:     BHASKAR SILVESTRE  MRN:      [id number]        Account:     [id number]   :      1934           Admitted:    2023       Document: [id number]    cc:  Johan Huggins DO

## 2023-06-01 NOTE — PLAN OF CARE
Pt alert, orientated x 4 this shift. Pt afebrile, remains on 1 liter via nasal cannula. Pt denies pain this shift. Pt reports dizziness with head movement and position changes.   Neuro assessments intact. Pupils are equal, round and reactive. No noted weakness in extremities. Pt denies any changes with vision and/or hearing.   Pt requests to commode this shift, as dizzy with any movement.   Pt passes bedside dysphagia screen, and tolerating regular diet this shift.   Pt contient of bladder this shift.   Pt free from falls and/or injuries this shift.     Face to face report given with opportunity to observe patient.    Report given to ALYSSA Villeda.     Stephanie Chapin RN   6/1/2023  7:22 PM

## 2023-06-01 NOTE — ED TRIAGE NOTES
Woke up dizzy this am. Dizziness with movement of head. Denies sob or chest pain. Ears cleaned on 22nd was told she had fluid in left ear. Pacemaker check recently and that was good.      Triage Assessment     Row Name 06/01/23 0953       Triage Assessment (Adult)    Airway WDL WDL       Respiratory WDL    Respiratory WDL WDL       Skin Circulation/Temperature WDL    Skin Circulation/Temperature WDL WDL       Cardiac WDL    Cardiac WDL WDL       Peripheral/Neurovascular WDL    Peripheral Neurovascular WDL WDL       Cognitive/Neuro/Behavioral WDL    Cognitive/Neuro/Behavioral WDL WDL

## 2023-06-02 ENCOUNTER — APPOINTMENT (OUTPATIENT)
Dept: CARDIOLOGY | Facility: HOSPITAL | Age: 88
End: 2023-06-02
Attending: INTERNAL MEDICINE
Payer: MEDICARE

## 2023-06-02 ENCOUNTER — APPOINTMENT (OUTPATIENT)
Dept: PHYSICAL THERAPY | Facility: HOSPITAL | Age: 88
End: 2023-06-02
Attending: INTERNAL MEDICINE
Payer: MEDICARE

## 2023-06-02 ENCOUNTER — APPOINTMENT (OUTPATIENT)
Dept: CT IMAGING | Facility: HOSPITAL | Age: 88
End: 2023-06-02
Attending: INTERNAL MEDICINE
Payer: MEDICARE

## 2023-06-02 VITALS
HEIGHT: 59 IN | TEMPERATURE: 97.6 F | BODY MASS INDEX: 22.09 KG/M2 | HEART RATE: 72 BPM | OXYGEN SATURATION: 98 % | WEIGHT: 109.57 LBS | DIASTOLIC BLOOD PRESSURE: 57 MMHG | SYSTOLIC BLOOD PRESSURE: 142 MMHG | RESPIRATION RATE: 16 BRPM

## 2023-06-02 LAB
ANION GAP SERPL CALCULATED.3IONS-SCNC: 7 MMOL/L (ref 7–15)
BACTERIA UR CULT: NORMAL
BUN SERPL-MCNC: 13.6 MG/DL (ref 8–23)
CALCIUM SERPL-MCNC: 8.6 MG/DL (ref 8.8–10.2)
CHLORIDE SERPL-SCNC: 104 MMOL/L (ref 98–107)
CREAT SERPL-MCNC: 0.76 MG/DL (ref 0.51–0.95)
DEPRECATED HCO3 PLAS-SCNC: 27 MMOL/L (ref 22–29)
ERYTHROCYTE [DISTWIDTH] IN BLOOD BY AUTOMATED COUNT: 13.2 % (ref 10–15)
GFR SERPL CREATININE-BSD FRML MDRD: 74 ML/MIN/1.73M2
GLUCOSE BLDC GLUCOMTR-MCNC: 89 MG/DL (ref 70–99)
GLUCOSE SERPL-MCNC: 88 MG/DL (ref 70–99)
HCT VFR BLD AUTO: 29.7 % (ref 35–47)
HGB BLD-MCNC: 9.6 G/DL (ref 11.7–15.7)
LVEF ECHO: NORMAL
MCH RBC QN AUTO: 30.1 PG (ref 26.5–33)
MCHC RBC AUTO-ENTMCNC: 32.3 G/DL (ref 31.5–36.5)
MCV RBC AUTO: 93 FL (ref 78–100)
PLATELET # BLD AUTO: 237 10E3/UL (ref 150–450)
POTASSIUM SERPL-SCNC: 4 MMOL/L (ref 3.4–5.3)
RBC # BLD AUTO: 3.19 10E6/UL (ref 3.8–5.2)
SODIUM SERPL-SCNC: 138 MMOL/L (ref 136–145)
TROPONIN T SERPL HS-MCNC: 76 NG/L
TROPONIN T SERPL HS-MCNC: 77 NG/L
WBC # BLD AUTO: 5.9 10E3/UL (ref 4–11)

## 2023-06-02 PROCEDURE — 250N000013 HC RX MED GY IP 250 OP 250 PS 637: Performed by: INTERNAL MEDICINE

## 2023-06-02 PROCEDURE — G0378 HOSPITAL OBSERVATION PER HR: HCPCS

## 2023-06-02 PROCEDURE — 85027 COMPLETE CBC AUTOMATED: CPT | Performed by: INTERNAL MEDICINE

## 2023-06-02 PROCEDURE — 93306 TTE W/DOPPLER COMPLETE: CPT | Mod: 26 | Performed by: INTERNAL MEDICINE

## 2023-06-02 PROCEDURE — 84484 ASSAY OF TROPONIN QUANT: CPT | Performed by: INTERNAL MEDICINE

## 2023-06-02 PROCEDURE — 97530 THERAPEUTIC ACTIVITIES: CPT | Mod: GP | Performed by: PHYSICAL THERAPIST

## 2023-06-02 PROCEDURE — 97161 PT EVAL LOW COMPLEX 20 MIN: CPT | Mod: GP | Performed by: PHYSICAL THERAPIST

## 2023-06-02 PROCEDURE — 93306 TTE W/DOPPLER COMPLETE: CPT

## 2023-06-02 PROCEDURE — 36415 COLL VENOUS BLD VENIPUNCTURE: CPT | Performed by: INTERNAL MEDICINE

## 2023-06-02 PROCEDURE — 99238 HOSP IP/OBS DSCHRG MGMT 30/<: CPT | Performed by: INTERNAL MEDICINE

## 2023-06-02 PROCEDURE — 80048 BASIC METABOLIC PNL TOTAL CA: CPT | Performed by: INTERNAL MEDICINE

## 2023-06-02 PROCEDURE — G1010 CDSM STANSON: HCPCS

## 2023-06-02 RX ADMIN — CLOPIDOGREL BISULFATE 75 MG: 75 TABLET ORAL at 08:53

## 2023-06-02 RX ADMIN — ASPIRIN 81 MG: 81 TABLET, COATED ORAL at 08:53

## 2023-06-02 RX ADMIN — ISOSORBIDE MONONITRATE 30 MG: 30 TABLET, EXTENDED RELEASE ORAL at 08:53

## 2023-06-02 ASSESSMENT — ACTIVITIES OF DAILY LIVING (ADL)
ADLS_ACUITY_SCORE: 20

## 2023-06-02 NOTE — PLAN OF CARE
"-Reason for hospital stay:  Vascular Disease  Living situation PTA: Home alone  Most recent vitals: /63 (BP Location: Right arm, Patient Position: Semi-Medina's, Cuff Size: Adult Regular)   Pulse 73   Temp 98.9  F (37.2  C) (Tympanic)   Resp 20   Ht 1.499 m (4' 11\")   Wt 49.7 kg (109 lb 9.1 oz)   SpO2 93%   BMI 22.13 kg/m      Pain Management:  denies pain  LOC:  A&O x 4  Neuro checks ar all WDL-no abnormalities noted.  Cardiac:  HRR regular-pt has pacemaker and BP is stable this shift.  Respiratory:  Lungs clear in the upper lobes w/ fine crackles in the bases equal bilat. 93 % on O2 1LPM  GI:  Normoactive BS x 4  :  Voids spontaneously w/o difficulty  Skin Issues:  Some scattered bruising present    IVF:  Sl  ABX:  N/A    Nutrition: Regular Diet  Activity: Assist x 1  Safety:  Bed in lowest position, wheels locked and alarm in place. Call light and personal items within reach and makes needs known. Frequent rounding.  Face to face report given with opportunity to observe patient.    Report given to ALYSSA Romo RN   6/2/2023  7:05 AM        "

## 2023-06-02 NOTE — PLAN OF CARE
Physical Therapy Discharge Summary    Reason for therapy discharge:    Discharged to home.    Progress towards therapy goal(s). See goals on Care Plan in [facility] electronic health record for goal details.  Goals met    Therapy recommendation(s):    No further therapy is recommended.    Goal Outcome Evaluation:

## 2023-06-02 NOTE — DISCHARGE INSTRUCTIONS
Appointments:  You have a hospital follow up appointment with Dr. Huggins on June 12th at 9:20 am.

## 2023-06-02 NOTE — PROGRESS NOTES
Medical record and Nelson Score reviewed. Participated in interdisciplinary rounds.  No apparent needs noted at this time. Care Transitions will remain available if needs arise.

## 2023-06-02 NOTE — PLAN OF CARE
"Most recent vitals: /57 (BP Location: Left arm, Patient Position: Sitting, Cuff Size: Adult Regular)   Pulse 72   Temp 97.6  F (36.4  C) (Tympanic)   Resp 16   Ht 1.499 m (4' 11\")   Wt 49.7 kg (109 lb 9.1 oz)   SpO2 98%   BMI 22.13 kg/m      Pain Management: Denies Pain  LOC: A/O, calm, cooperative  Cardiac: Pacemaker  Respiratory: WDL  GI: WDL  : WDL  Skin Issues: Scattered bruising    IVF: Saline Locked (removed @ 1140)  ABX: None    Nutrition: Reg diet  ADL's: Independent  Ambulation: SBA  Safety: [facility]    Patient discharged at 11:50 AM via wheel chair accompanied by spouse and staff. Prescriptions - None ordered for discharge. All belongings sent with patient.     Discharge instructions reviewed with Frances. Listed belongings gathered and returned to patient. Yes    Patient discharged to Home.   Report called to N/A    Surgical Patient   Surgical Procedures during stay: No  Did patient receive discharge instruction on wound care and recognition of infection symptoms? N/A    MISC  Follow up appointment made:  Yes  Home medications returned to patient: N/A  Patient reports pain was well managed at discharge: Yes      "

## 2023-06-02 NOTE — DISCHARGE SUMMARY
Admit Date: 06/01/2023  Discharge Date: 06/02/23    ADMISSION DIAGNOSIS:  Dizziness.    DISCHARGE DIAGNOSES:    1.  Possible vertigo.  2.  History of coronary artery disease, myocardial infarction and stent placement.  3.  Pacemaker secondary to second-degree heart block.  4.  History of pulmonary hypertension.  5.  Chronic obstructive pulmonary disease, on 1 L of nasal cannula oxygen.  6.  Left bundle branch block.  7.  Diastolic congestive heart failure.  7.  Hypertension.  8.  Hyperlipidemia.  9.  Peptic ulcer disease.    PROCEDURES/CONSULTATIONS:    1.  Physical therapy consultation.  2.  Occupational therapy consultation.  3.  Echocardiogram with an EF of 60%-65% and early grade I diastolic dysfunction.  4.  CT of the head without contrast.  5.  CT head perfusion with contrast.  6.  CT head and neck with contrast.  7.  Repeat CT head without contrast.    PENDING TEST RESULTS AT TIME OF DISCHARGE:  None.    DISPOSITION:  To home, self-care.    HISTORY OF PRESENTING ILLNESS:  Please see admission H and P.    PAST MEDICAL HISTORY:  Please see admission H and P.    HOSPITAL COURSE:  The patient is an 89-year-old female with a history of coronary artery disease, MI and stent placement along with pacemaker placement for second-degree heart block and a history of left bundle branch block, who presented to the Emergency Department yesterday with dizziness.  She was actually seen in the Emergency Department the day before due to choking on some steak, however, felt fine and was discharged home.  That night she felt fine, however, awoke yesterday with some profound dizziness.  Over the course of her hospital stay, that dizziness did improve.  Nevertheless, she did undergo an extensive workup for the possibility of a CVA.  Imaging as outlined in the HPI was negative for any acute findings.  It was recommended the patient undergo an MRI; however, due to her pacemaker, she was not able to do so.  The patient then was  recommended to have a repeat CT scan of the head this morning, which was negative for any acute findings.    The patient was evaluated by physical therapy during this hospital stay and this morning is without any symptoms.  Hence, it is thought that she may have had a fleeting episode of vertigo, which has resolved.  I did educate the patient on the fact that the vertigo may return at some point, however, it is a bit unpredictable.    In regard to her cardiac status, she had no complaints of chest pain.  She had mild elevation in her troponins; however, these never really continued to climb and remained in the 60-70 range throughout her stay.  The patient did not have any evidence of ACS nor reports of chest pain.    As stated, the patient's symptoms have completely resolved.  No source of infection or CVA was identified, and no cardiac concerns were identified.  Echocardiogram was completed as stated with normal EF and early diastolic dysfunction without any significant wall motion abnormality.    DISCHARGE PHYSICAL EXAM:    VITAL SIGNS:  Blood pressure is 144/64, heart rate 74, temperature 97.5, respirations 18, O2 sat 96% on 1 L nasal cannula oxygen.    GENERAL:  The patient is alert and oriented, pleasant, conversant, in no acute distress.  HEART:  Regular rate and rhythm.  LUNGS:  Clear to auscultation.  ABDOMEN:  Benign.  EXTREMITIES:  Without edema.  NEUROLOGIC:  No focal or lateralizing deficits noted, and as stated, her dizziness has completely resolved.    DISCHARGE MEDICATIONS:  No changes were made to her medications during this hospital stay.    ADDITIONAL DISCHARGE INSTRUCTIONS:  1.  Code status:  Full.  2.  Diet:  Cardiac.  3.  Activity:  As tolerated.  4.  Followup appointment and referrals:  The patient should follow up with her primary care provider next week.    Ant Combs DO        D: 06/02/2023   T: 06/02/2023   MT: LA    Name:     BHASKAR SILVESTRENathan  MRN:      [id number]         Account:      [id number]   :      1934           Service Date: 2023       Document: [id number]    cc:  Johan Huggins DO

## 2023-06-02 NOTE — CONSULTS
[facility]    Stroke Consult Note    Reason for Consult:  Stroke     Chief Complaint: Dizziness       HPI  Frances Gilliam is a 89 year old female with PMHx significant for macular degeneration, CAD s/p stenting, GIB, HTN, HLD, PVD, known stenosis of bilateral carotid arteries, pacemaker (MRI compatible). She went to bed at her baseline 5/31 around 2300 and woke up 6/1 around 0700 with dizziness. She was unable to ambulate without assistance. MRI is unable to scan her d/t pacemaker despite its compatibility.     Her symptoms have resolved. She worked with PT without recurrence of dizziness. She feels well enough to go home today.     Stroke Evaluation Summarized    MRI/Head CT CT 6/2: no acute intracranial abnormality     CT 6/1: no acute pathology    Intracranial Vasculature No stenoses or occlusions    Cervical Vasculature 80% stenoses in both carotid bifurcations     Echocardiogram No embolus, EF 60-65%, mild concentric LVH, moderate LAE   EKG/Telemetry Not yet uploaded    Other Testing CT Perfusion: No areas of cerebral blood flow less than 30% are noted. There are no areas of T max of greater than 6 seconds.     LDL  6/1/2023: 64 mg/dL   A1C  6/1/2023: 5.9 %   Troponin 6/2/2023: 76 ng/L       Impression  Vertigo, likely peripheral     Recommendations  - okay to discharge from a stroke perspective   - Neurochecks and Vital Signs every 4 hours    - normotension   - Daily aspirin 81 mg for secondary stroke prevention  - Plavix (clopidogrel) 75 mg PO Daily   - Statin: continue PTA atorvastatin 40 mg daily   - Telemetry   - discharge with 30 day cardiac monitor (ordered); not needed if pacemaker also monitors   - Bedside Glucose Monitoring  - Euthermia, Euglycemia     Patient Follow-up    - with PCP in 1-2 weeks  - with general neurology in 6-8 weeks (ordered)  - follow up with cardiology as planned in September re: bilateral carotid stenosis     Thank you for this consult. No further stroke  "evaluation is recommended, so we will sign off. Please contact us with any additional questions.    Roseline Torres NP  Vascular Neurology    To page me or covering stroke neurology team member, click here: [facility]  Choose \"On Call\" tab at top, then select \"NEUROLOGY/ALL SITES\" from middle drop-down box, press Enter, then look for \"stroke\" or \"telestroke\" for your site.    _____________________________________________________    Clinically Significant Risk Factors Present on Admission                # Drug Induced Platelet Defect: home medication list includes an antiplatelet medication   # Hypertension: Noted on problem list             Past Medical History   Past Medical History:   Diagnosis Date     Age-related macular degeneration      Aortic valve sclerosis      CAD (coronary artery disease)      Essential thrombocytosis (H)      GI hemorrhage      History of MI (myocardial infarction)      Hypertension      Iron deficiency anemia      Mixed hyperlipidemia      Peripheral vascular disease (H)      Vitamin B 12 deficiency      Vitamin D deficiency      Past Surgical History   Past Surgical History:   Procedure Laterality Date     ESOPHAGOSCOPY, GASTROSCOPY, DUODENOSCOPY (EGD), COMBINED N/A 12/27/2017    Procedure: COMBINED ESOPHAGOSCOPY, GASTROSCOPY, DUODENOSCOPY (EGD);  UPPER ENDOSCOPY WITH BIOPSIES, ENTEROSCOPY WITH  SMALL BOWEL ARGON PLASMA COAGULATION;  Surgeon: Robin Vaughn MD;  Location: [facility]     DC PATIENT HAS A CORONARY ARTERY STENT       Medications   Home Meds  Prior to Admission medications    Medication Sig Start Date End Date Taking? Authorizing Provider   amLODIPine (NORVASC) 10 MG tablet Take 10 mg by mouth every morning 11/16/22  Yes Reported, Patient   aspirin 81 MG EC tablet Take 81 mg by mouth every morning   Yes Reported, Patient   atorvastatin (LIPITOR) 40 MG tablet Take 40 mg by mouth At Bedtime 11/7/22  Yes Reported, Patient   Calcium Carb-Cholecalciferol (CALCIUM PLUS VITAMIN D3 PO) Take " 1 tablet by mouth every morning   Yes Reported, Patient   cetirizine (ZYRTEC) 10 MG tablet Take 10 mg by mouth daily   Yes Reported, Patient   clopidogrel (PLAVIX) 75 MG tablet Take 75 mg by mouth every morning 22  Yes Reported, Patient   cyanocobalamin (VITAMIN B-12) 100 MCG tablet Take 1 tablet by mouth every morning 10/3/19  Yes Reported, Patient   fluticasone-vilanterol (BREO ELLIPTA) 100-25 MCG/INH inhaler Inhale 1 puff into the lungs every morning   Yes Reported, Patient   isosorbide mononitrate (IMDUR) 30 MG 24 hr tablet Take 30 mg by mouth every morning 22  Yes Reported, Patient   losartan (COZAAR) 25 MG tablet Take 12.5 mg by mouth At Bedtime 22  Yes Reported, Patient   metoprolol succinate ER (TOPROL XL) 25 MG 24 hr tablet Take 25 mg by mouth every morning 22  Yes Reported, Patient   Multiple Vitamins-Minerals (ICAPS PO) Take 1 capsule by mouth every morning   Yes Reported, Patient       Scheduled Meds    aspirin  81 mg Oral Daily     atorvastatin  40 mg Oral At Bedtime     clopidogrel  75 mg Oral Daily     isosorbide mononitrate  30 mg Oral Daily     sodium chloride (PF)  3 mL Intracatheter Q8H       Infusion Meds    - MEDICATION INSTRUCTIONS -         PRN Meds  lidocaine 4%, lidocaine (buffered or not buffered), - MEDICATION INSTRUCTIONS -, sodium chloride (PF)    Allergies   Allergies   Allergen Reactions     Niacin Rash     Family History   Family History   Problem Relation Age of Onset     Heart Disease Father      Social History   Social History     Tobacco Use     Smoking status: Former     Types: Cigarettes     Quit date:      Years since quittin.4     Smokeless tobacco: Never   Substance Use Topics     Alcohol use: Yes     Comment: occasionally     Drug use: Never       Review of Systems   The 10 point Review of Systems is negative other than noted in the HPI or here.        PHYSICAL EXAMINATION   Temp:  [97.5  F (36.4  C)-98.9  F (37.2  C)] 97.5  F (36.4  C)  Pulse:   [67-79] 74  Resp:  [15-27] 18  BP: (131-173)/(58-73) 144/64  SpO2:  [86 %-97 %] 96 %    Neuro Exam  Mental Status:  alert, oriented x 3, follows commands, speech clear and fluent, naming and repetition normal  Cranial Nerves:  visual fields intact (tested by nurse), EOMI with normal smooth pursuit, facial sensation intact and symmetric (tested by nurse), facial movements symmetric, hearing not formally tested but intact to conversation, no dysarthria, shoulder shrug equal bilaterally, tongue protrusion midline  Motor:  no abnormal movements, able to move all limbs antigravity spontaneously with no signs of hemiparesis observed, no pronator drift  Reflexes:  unable to test (telestroke)  Sensory:  light touch sensation intact and symmetric throughout upper and lower extremities (assessed by nurse), no extinction on double simultaneous stimulation (assessed by nurse)  Coordination:  normal finger-to-nose and heel-to-shin bilaterally without dysmetria  Station/Gait:  unable to test due to telestroke    Dysphagia Screen  Per Nursing    Stroke Scales    NIHSS  1a. Level of Consciousness 0-->Alert, keenly responsive   1b. LOC Questions 0-->Answers both questions correctly   1c. LOC Commands 0-->Performs both tasks correctly   2.   Best Gaze 0-->Normal   3.   Visual 0-->No visual loss   4.   Facial Palsy 0-->Normal symmetrical movements   5a. Motor Arm, Left 0-->No drift, limb holds 90 (or 45) degrees for full 10 secs   5b. Motor Arm, Right 0-->No drift, limb holds 90 (or 45) degrees for full 10 secs   6a. Motor Leg, Left 0-->No drift, leg holds 30 degree position for full 5 secs   6b. Motor Leg, right 0-->No drift, leg holds 30 degree position for full 5 secs   7.   Limb Ataxia 0-->Absent   8.   Sensory 0-->Normal, no sensory loss   9.   Best Language 0-->No aphasia, normal   10. Dysarthria 0-->Normal   11. Extinction and Inattention  0-->No abnormality   Total 0 (06/02/23 1103)       Modified Mora Score (Pre-morbid)  0  - No symptoms.     Imaging  I personally reviewed all imaging; relevant findings per HPI.    Labs Data   CBC  Recent Labs   Lab 06/02/23  0510 06/01/23  1032   WBC 5.9 6.6   RBC 3.19* 3.59*   HGB 9.6* 11.0*   HCT 29.7* 33.6*    266     Basic Metabolic Panel   Recent Labs   Lab 06/02/23  0557 06/02/23  0510 06/01/23  2116 06/01/23  1058 06/01/23  1032   NA  --  138  --   --  139   POTASSIUM  --  4.0  --   --  4.0   CHLORIDE  --  104  --   --  103   CO2  --  27  --   --  23   BUN  --  13.6  --   --  14.4   CR  --  0.76  --   --  0.74   GLC 89 88 83   < > 100*   CATARINA  --  8.6*  --   --  9.1    < > = values in this interval not displayed.     Liver Panel  No results for input(s): PROTTOTAL, ALBUMIN, BILITOTAL, ALKPHOS, AST, ALT, BILIDIRECT in the last 168 hours.  INR    Recent Labs   Lab Test 06/01/23  1032 02/16/22  0305   INR 1.00 1.01      Lipid Profile    Recent Labs   Lab Test 06/01/23  1235   CHOL 148   HDL 59   LDL 64   TRIG 126     A1C    Recent Labs   Lab Test 06/01/23  1032   A1C 5.9*     Troponin    Recent Labs   Lab 06/02/23  0510 06/02/23  0000 06/01/23  1809   CTROPT 76* 77* 69*          Stroke Consult Data Data   Telestroke Service Details  (for non-emergent stroke consult with tele)  Video start time 06/02/23   1102   Video end time 06/02/23   1113   Type of service telemedicine diagnostic assessment of acute neurological changes   Reason telemedicine is appropriate patient requires assessment with a specialist for diagnosis and treatment of neurological symptoms   Mode of transmission secure interactive audio and video communication per Judy   Originating site (patient location) [facility]    Distant site (provider location) [facility]     I have personally spent a total of 30 minutes providing care today, time spent in reviewing medical records and reviewing tests, examining the patient and obtaining history, coordination of care, and discussion  with the patient and/or family regarding diagnostic results, prognosis, symptom management, risks and benefits of management options, and development of plan of care. Greater than 50% was spent in counseling and coordination of care.

## 2023-06-02 NOTE — PLAN OF CARE
Speech Language Therapy Discharge Summary    Reason for therapy discharge:    Discharged to home.    Progress towards therapy goal(s). See goals on Care Plan in [facility] electronic health record for goal details.  Goals were not established due to pt discharging prior to speech therapy evaluation.    Therapy recommendation(s):    No further therapy is recommended.

## 2023-06-02 NOTE — UTILIZATION REVIEW
"    Admission Status; Secondary Review Determination     Under the authority of the Utilization Management Committee, the utilization review process indicated a secondary review on the above patient. The review outcome is based on review of the medical records, discussions with staff, and applying clinical experience noted on the date of the review.     (x) Observation Status Appropriate - This patient does not meet hospital inpatient criteria and is placed in observation status. If this patient's primary payer is Medicare and was admitted as an inpatient, Condition Code 44 should be used and patient status changed to \"observation\".     RATIONALE FOR DETERMINATION:    89-year-old female with an extensive past medical history, inclusive of coronary artery disease, status post stenting, MI, COPD, hypertension, hyperlipidemia, peripheral vascular disease, pacemaker placement for second-degree heart block, pulmonary hypertension, chronic hypoxia requiring 1 L nasal cannula, left bundle branch block pattern and chronic diastolic heart failure, who presented to the emergency department with complaints of dizziness.  A diagnosis of vertigo is suspected    CT of the head shows no acute findings.  CTA of the neck shows 80% carotid stenosis bilaterally.  Neurology consulted and recommended medical management    PT/OT consults have been requested    The severity of illness, intensity of service provided, expected LOS and risk for adverse outcome make the care appropriate for further observation; however, doesn't meet criteria for hospital inpatient admission. Dr Combs was notified of this determination via text message today.   This document was produced using voice recognition software.   The information on this document is developed by the utilization review team in order for the business office to ensure compliance. This only denotes the appropriateness of proper admission status and does not reflect the quality of " care rendered.   The definitions of Inpatient Status and Observation Status used in making the determination above are those provided in the CMS Coverage Manual, Chapter 1 and Chapter 6, section 70.4.     Sincerely,     Gerardo Marin MD  Utilization Review   Physician Advisor   [facility]

## 2023-06-02 NOTE — PROGRESS NOTES
06/02/23 0931   Appointment Info   Signing Clinician's Name / Credentials (PT) Felicia Salgado DPT   Rehab Comments (PT) Pt reports she has not had any dizziness today, denies dizziness with rolling in bed last night   Living Environment   People in Home alone   Current Living Arrangements apartment   Home Accessibility no concerns   Transportation Anticipated family or friend will provide   [facility]   Self-Care   Usual Activity Tolerance good   Current Activity Tolerance good   Equipment Currently Used at Home cane, straight;shower chair   Fall history within last six months no   Activity/Exercise/Self-Care Comment Pt reports she is independent with all ADLs at baseline   General Information   Onset of Illness/Injury or Date of Surgery 06/01/23   Referring Physician Dr. Combs   Patient/Family Therapy Goals Statement (PT) To go home   Pertinent History of Current Problem (include personal factors and/or comorbidities that impact the POC) Pt admitted with acute onset of vertigo and difficulty with ambulation, rule out CVA   General Observations Pt pleasant, resting in bed   Cognition   Affect/Mental Status (Cognition) WFL   Orientation Status (Cognition) oriented x 3   Follows Commands (Cognition) WFL   Pain Assessment   Patient Currently in Pain No   Integumentary/Edema   Integumentary/Edema no deficits were identifed   Posture    Posture Forward head position   Range of Motion (ROM)   Range of Motion ROM is WFL   Strength (Manual Muscle Testing)   Strength (Manual Muscle Testing) strength is WFL   Bed Mobility   Bed Mobility no deficits identified   Comment, (Bed Mobility) able to transfer sup<>sit in fully flat bed without dizziness, able to roll in bed without dizziness   Transfers   Transfers sit-stand transfer   Sit-Stand Transfer   Sit-Stand [place] (Transfers) supervision   Assistive Device (Sit-Stand Transfers) walker,  front-wheeled   Gait/Stairs (Locomotion)   [place] Level (Gait) supervision   Assistive Device (Gait) walker, front-wheeled   Distance in Feet 5'   Pattern (Gait) step-through   Comment, (Gait/Stairs) no instability, transferred to commode   Balance   Balance Comments good without support   Clinical Impression   Criteria for Skilled Therapeutic Intervention Yes, treatment indicated   PT Diagnosis (PT) gait disturbance   Influenced by the following impairments decreased activity tolerance   Functional limitations due to impairments decreased tolerance for daily activities   Clinical Presentation (PT Evaluation Complexity) Stable/Uncomplicated   Clinical Presentation Rationale clinical judgement   Clinical Decision Making (Complexity) low complexity   Planned Therapy Interventions (PT) patient/family education;gait training;progressive activity/exercise   Risk & Benefits of therapy have been explained evaluation/treatment results reviewed;care plan/treatment goals reviewed;risks/benefits reviewed;participants included;patient   Clinical Impression Comments PT evaluation completed.  Pt lives in an apartment alone and is independent at baseline.  Pt reports using a SEC only at night and early mornings, otherwise no AD.  Pt denies any dizziness or imbalance with ambulation today and is steady on feet. No formal vestibular assessment completed due to inability provoke any dizziness with mobility and bed mobility.  At this time pt appears appropriate to return home, no formal PT follow up warranted at this time.   PT Total Evaluation Time   PT Eval, Low Complexity Minutes (03728) 10   Physical Therapy Goals   PT Frequency One time eval and treatment only   PT Predicted Duration/Target Date for Goal Attainment 06/02/23   PT Goals Gait   PT: Gait Supervision/stand-by assist;Rolling walker;Greater than 200 feet   Interventions   Interventions Quick Adds Therapeutic Activity   Therapeutic Activity   Therapeutic  "Activities: dynamic activities to improve functional performance Minutes (37822) 12   Symptoms Noted During/After Treatment None   Treatment Detail/Skilled Intervention Pt ambulated additional 200' with FWW SBA, steady on feet, good pace, no LOB and no dizziness.  Ambulated additional 40' without AD SBA, again no LOB, no dizziness, no instability.  Upon returning to room, pt was able to transfer sup<>sit fully supine without dizziness, roll without dizziness and reports feeling at baseline except \"my throat\".  Discussed discharge and pt reports no concerns with returning home.  Pt left sitting up in chair with clip alarm on and call light in reach.   PT Discharge Planning   PT Plan DC skilled PT, no further needs   PT Discharge Recommendation (DC Rec) home   PT Rationale for DC Rec PT evaluation completed.  Pt lives in an apartment alone and is independent at baseline.  Pt reports using a SEC only at night and early mornings, otherwise no AD.  Pt denies any dizziness or imbalance with ambulation today and is steady on feet. No formal vestibular assessment completed due to inability provoke any dizziness with mobility and bed mobility.  At this time pt appears appropriate to return home, no formal PT follow up warranted at this time.   PT Brief overview of current status 200' with FWW SBA, 40' without AD SBA, no dizziness or instability   Total Session Time   Timed Code Treatment Minutes 12   Total Session Time (sum of timed and untimed services) 22     "

## 2023-06-02 NOTE — PLAN OF CARE
Occupational Therapy: Orders received. Chart reviewed and discussed with care team.? Occupational Therapy not indicated per Physical Therapy as patient is doing well moving around and denied dizziness today.? Defer discharge recommendations to Physical Therapy.? Will complete orders.

## 2023-11-27 ENCOUNTER — HOSPITAL ENCOUNTER (EMERGENCY)
Facility: HOSPITAL | Age: 88
Discharge: HOME | End: 2023-11-27
Attending: NURSE PRACTITIONER | Admitting: NURSE PRACTITIONER
Payer: MEDICARE

## 2023-11-27 VITALS
WEIGHT: 110 LBS | DIASTOLIC BLOOD PRESSURE: 65 MMHG | RESPIRATION RATE: 24 BRPM | OXYGEN SATURATION: 94 % | BODY MASS INDEX: 22.22 KG/M2 | SYSTOLIC BLOOD PRESSURE: 145 MMHG | TEMPERATURE: 97.9 F | HEART RATE: 70 BPM

## 2023-11-27 DIAGNOSIS — R53.82 CHRONIC FATIGUE: ICD-10-CM

## 2023-11-27 LAB
ANION GAP SERPL CALCULATED.3IONS-SCNC: 13 MMOL/L (ref 7–15)
BASOPHILS # BLD AUTO: 0 10E3/UL (ref 0–0.2)
BASOPHILS NFR BLD AUTO: 1 %
BUN SERPL-MCNC: 16.5 MG/DL (ref 8–23)
CALCIUM SERPL-MCNC: 9.6 MG/DL (ref 8.8–10.2)
CHLORIDE SERPL-SCNC: 106 MMOL/L (ref 98–107)
CREAT SERPL-MCNC: 0.78 MG/DL (ref 0.51–0.95)
DEPRECATED HCO3 PLAS-SCNC: 23 MMOL/L (ref 22–29)
EGFRCR SERPLBLD CKD-EPI 2021: 72 ML/MIN/1.73M2
EOSINOPHIL # BLD AUTO: 0.3 10E3/UL (ref 0–0.7)
EOSINOPHIL NFR BLD AUTO: 4 %
ERYTHROCYTE [DISTWIDTH] IN BLOOD BY AUTOMATED COUNT: 14.9 % (ref 10–15)
GLUCOSE SERPL-MCNC: 122 MG/DL (ref 70–99)
HCT VFR BLD AUTO: 31 % (ref 35–47)
HGB BLD-MCNC: 9.9 G/DL (ref 11.7–15.7)
HOLD SPECIMEN: NORMAL
IMM GRANULOCYTES # BLD: 0 10E3/UL
IMM GRANULOCYTES NFR BLD: 0 %
LYMPHOCYTES # BLD AUTO: 0.8 10E3/UL (ref 0.8–5.3)
LYMPHOCYTES NFR BLD AUTO: 13 %
MCH RBC QN AUTO: 31.5 PG (ref 26.5–33)
MCHC RBC AUTO-ENTMCNC: 31.9 G/DL (ref 31.5–36.5)
MCV RBC AUTO: 99 FL (ref 78–100)
MONOCYTES # BLD AUTO: 0.5 10E3/UL (ref 0–1.3)
MONOCYTES NFR BLD AUTO: 8 %
NEUTROPHILS # BLD AUTO: 4.8 10E3/UL (ref 1.6–8.3)
NEUTROPHILS NFR BLD AUTO: 74 %
NRBC # BLD AUTO: 0 10E3/UL
NRBC BLD AUTO-RTO: 0 /100
PLATELET # BLD AUTO: 285 10E3/UL (ref 150–450)
POTASSIUM SERPL-SCNC: 3.6 MMOL/L (ref 3.4–5.3)
RBC # BLD AUTO: 3.14 10E6/UL (ref 3.8–5.2)
SODIUM SERPL-SCNC: 142 MMOL/L (ref 135–145)
WBC # BLD AUTO: 6.4 10E3/UL (ref 4–11)

## 2023-11-27 PROCEDURE — 85025 COMPLETE CBC W/AUTO DIFF WBC: CPT | Performed by: NURSE PRACTITIONER

## 2023-11-27 PROCEDURE — 82310 ASSAY OF CALCIUM: CPT | Performed by: NURSE PRACTITIONER

## 2023-11-27 PROCEDURE — 99283 EMERGENCY DEPT VISIT LOW MDM: CPT | Performed by: NURSE PRACTITIONER

## 2023-11-27 PROCEDURE — 99283 EMERGENCY DEPT VISIT LOW MDM: CPT

## 2023-11-27 PROCEDURE — 36415 COLL VENOUS BLD VENIPUNCTURE: CPT | Performed by: NURSE PRACTITIONER

## 2023-11-27 ASSESSMENT — ENCOUNTER SYMPTOMS
MUSCULOSKELETAL NEGATIVE: 1
NEUROLOGICAL NEGATIVE: 1
RESPIRATORY NEGATIVE: 1
GASTROINTESTINAL NEGATIVE: 1
PSYCHIATRIC NEGATIVE: 1
FATIGUE: 1
ALLERGIC/IMMUNOLOGIC NEGATIVE: 1
ENDOCRINE NEGATIVE: 1
CARDIOVASCULAR NEGATIVE: 1
HEMATOLOGIC/LYMPHATIC NEGATIVE: 1
CHILLS: 1
FEVER: 0
EYES NEGATIVE: 1

## 2023-11-27 ASSESSMENT — ACTIVITIES OF DAILY LIVING (ADL): ADLS_ACUITY_SCORE: 35

## 2023-11-27 NOTE — ED PROVIDER NOTES
History     Chief Complaint   Patient presents with    Shortness of Breath     HPI  Frances Gilliam is a 89 year old individual with history of anemia, anxiety, asthma, coronary atherosclerosis, hypertension, MI, aortic valve sclerosis, pacemaker, comes in for complaints of fatigue that has been going on for multiple months.  Now is also having some chills over the past week.  Patient denies dizziness, lightheadedness, chest pain, shortness of breath out of the ordinary.  No cough reported.  No abdominal pain, nausea, vomiting reported.  No diarrhea reported.  No melena or hematochezia reported.  Patient just states that she is feeling tired.  This reason patient comes in.    Allergies:  Allergies   Allergen Reactions    Niacin Rash       Problem List:    Patient Active Problem List    Diagnosis Date Noted    Cardiac pacemaker in situ 06/01/2023     Priority: Medium    Dizziness 06/01/2023     Priority: Medium    Vascular disease 06/01/2023     Priority: Medium    Oxygen dependent 06/01/2023     Priority: Medium    At risk for falls 06/01/2023     Priority: Medium    Pulmonary hypertension (H) 11/25/2022     Priority: Medium    Hypoxia 11/25/2022     Priority: Medium    LBBB (left bundle branch block) 11/25/2022     Priority: Medium    Generalized muscle weakness 11/25/2022     Priority: Medium    Pacemaker 02/16/2021     Priority: Medium     Formatting of this note might be different from the original.  Second degree HB, syncope. Dr. Mohr implanted Pacemaker 2/14/2021  Device Information:   Generator: BS Accolade DR NINI ROONEY [id number] (SN: [id number] - Feb, 14 2021)  RA Lead: BS Ingevity+ [id number] (SN: [id number] - Feb, 14 2021)   RV Lead: BS Ingevity+ [id number] (SN: [id number] - Feb, 14 2021) RV basal septum      Aneurysm of infrarenal abdominal aorta (H24) 02/13/2021     Priority: Medium    Bilateral carotid artery stenosis 02/04/2020     Priority: Medium    Other hyperlipidemia 02/04/2020     Priority: Medium    Coronary artery  disease of native artery of native heart with stable angina pectoris (H24) 02/04/2020     Priority: Medium     Formatting of this note might be different from the original.  99% stenosis to 1st RPL treated w/ PTCA, too small to stent.    Residual 50% LAD stenosis FFR pre 0.90, post 0.89, not stented      Essential hypertension 02/04/2020     Priority: Medium    Coronary atherosclerosis 12/21/2019     Priority: Medium    Disorder of bone and cartilage 12/21/2019     Priority: Medium    Hyperlipidemia 12/21/2019     Priority: Medium    Hypertension 12/21/2019     Priority: Medium    Macular degeneration 12/21/2019     Priority: Medium    Occlusion and stenosis of carotid artery without mention of cerebral infarction 12/21/2019     Priority: Medium     Overview:   with 50-75% stenoses of left, right internal carotid arteries, documented by   US 5/15/02 and 06/16/04      Closed fracture of multiple ribs of left side 12/21/2019     Priority: Medium    Syncope 12/21/2019     Priority: Medium    Multiple rib fractures 12/21/2019     Priority: Medium    Vitamin D deficiency      Priority: Medium    Vitamin B 12 deficiency      Priority: Medium    Peripheral vascular disease (H24)      Priority: Medium    Iron deficiency anemia      Priority: Medium    History of MI (myocardial infarction)      Priority: Medium    GI hemorrhage      Priority: Medium    Essential thrombocytosis (H)      Priority: Medium    CAD (coronary artery disease)      Priority: Medium    Aortic valve sclerosis      Priority: Medium    Anemia 05/12/2016     Priority: Medium    Peptic ulcer disease 11/12/2014     Priority: Medium    Anxiety about health 11/04/2014     Priority: Medium    Panic disorder 11/04/2014     Priority: Medium    Asthma 10/11/2011     Priority: Medium    Mild intermittent asthma, uncomplicated 10/11/2011     Priority: Medium    Lumbago 09/06/2006     Priority: Medium     IMO Update 10/11      Sacroiliitis, not elsewhere classified  (H24) 2006     Priority: Medium     IMO Update 10/11          Past Medical History:    Past Medical History:   Diagnosis Date    Age-related macular degeneration     Aortic valve sclerosis     CAD (coronary artery disease)     Essential thrombocytosis (H)     GI hemorrhage     History of MI (myocardial infarction)     Hypertension     Iron deficiency anemia     Mixed hyperlipidemia     Peripheral vascular disease (H24)     Vitamin B 12 deficiency     Vitamin D deficiency        Past Surgical History:    Past Surgical History:   Procedure Laterality Date    ESOPHAGOSCOPY, GASTROSCOPY, DUODENOSCOPY (EGD), COMBINED N/A 2017    Procedure: COMBINED ESOPHAGOSCOPY, GASTROSCOPY, DUODENOSCOPY (EGD);  UPPER ENDOSCOPY WITH BIOPSIES, ENTEROSCOPY WITH  SMALL BOWEL ARGON PLASMA COAGULATION;  Surgeon: Robin Vaughn MD;  Location: [facility]    ND PATIENT HAS A CORONARY ARTERY STENT         Family History:    Family History   Problem Relation Age of Onset    Heart Disease Father        Social History:  Marital Status:   [5]  Social History     Tobacco Use    Smoking status: Former     Types: Cigarettes     Quit date:      Years since quittin.9    Smokeless tobacco: Never   Substance Use Topics    Alcohol use: Yes     Comment: occasionally    Drug use: Never        Medications:    amLODIPine (NORVASC) 10 MG tablet  aspirin 81 MG EC tablet  atorvastatin (LIPITOR) 40 MG tablet  Calcium Carb-Cholecalciferol (CALCIUM PLUS VITAMIN D3 PO)  cetirizine (ZYRTEC) 10 MG tablet  clopidogrel (PLAVIX) 75 MG tablet  cyanocobalamin (VITAMIN B-12) 100 MCG tablet  fluticasone-vilanterol (BREO ELLIPTA) 100-25 MCG/INH inhaler  isosorbide mononitrate (IMDUR) 30 MG 24 hr tablet  losartan (COZAAR) 25 MG tablet  metoprolol succinate ER (TOPROL XL) 25 MG 24 hr tablet  Multiple Vitamins-Minerals (ICAPS PO)          Review of Systems   Constitutional:  Positive for chills and fatigue. Negative for fever.   HENT: Negative.     Eyes:  Negative.    Respiratory: Negative.     Cardiovascular: Negative.    Gastrointestinal: Negative.    Endocrine: Negative.    Genitourinary: Negative.    Musculoskeletal: Negative.    Skin: Negative.    Allergic/Immunologic: Negative.    Neurological: Negative.    Hematological: Negative.    Psychiatric/Behavioral: Negative.         Physical Exam   BP: 145/68  Pulse: 75  Temp: 97.9  F (36.6  C)  Resp: 24  Weight: 49.9 kg (110 lb)  SpO2: (!) 85 % (on room air. placed on oxygen at 1lpm per at home norm.)      GENERAL APPEARANCE:  The patient is a 89 year old well-developed, well-nourished individual in no acute distress that appears as stated age.  NECK:  Supple.  Trachea is midline.  No carotid bruits present on auscultation.  CHEST:  Symmetric.  Non-tender to palpation.  No crepitus or deformity.  LUNGS:  Breathing is easy.  Breath sounds are equal and clear bilaterally.  No wheezes, rhonchi, or rales.  HEART:  Regular rate and rhythm with normal S1 and S2.  Grade 3/6 systolic murmur heard best on right sternal border present.  ABDOMEN:  Soft and benign.  No mass, tenderness, guarding, or rebound.  No organomegaly or hernia.  Bowel sounds are present.  No CVA tenderness or flank mass.  No abdominal bruits or thrills present upon auscultation/palpation.  GENITOURINARY: No anterior pelvic tenderness noted to palpation.  EXTREMITIES: Trace lower extremity edema bilaterally.     NEUROLOGIC:  No focal sensory or motor deficits are noted.    PSYCHIATRIC:  The patient is awake, alert, and oriented x4.  Recent and remote memory is intact.  Appropriate mood and affect.  Calm and cooperative with history and physical exam.  SKIN:  Warm, dry, and well perfused.  Good turgor.  No lesions, nodules, or rashes are noted.  No bruising noted.      Comment: Discrepancies between my note and notes on behalf of the nursing team or other care providers are secondary to my findings reflecting my physical examination and questioning of the  patient.  Any conflicting information provided is not in line with my examination of the patient.       ED Course              ED Course as of 11/27/23 1737   Mon Nov 27, 2023   1358 Labs ordered while patient in lobby.   1630 In to see patient and history/physical completed.    1724 Hemoglobin 9.9 which is improved.  Patient is in no distress at rest.  This has been going on for multiple months.  For this reason no further testing is warranted.  Will have patient follow-up with her PCP for reevaluation.  Patient in agreement.               Results for orders placed or performed during the hospital encounter of 11/27/23 (from the past 24 hour(s))   [place] Draw *Canceled*    Narrative    The following orders were created for panel order [place] Draw.  Procedure                               Abnormality         Status                     ---------                               -----------         ------                       Please view results for these tests on the individual orders.   CBC with platelets differential    Narrative    The following orders were created for panel order CBC with platelets differential.  Procedure                               Abnormality         Status                     ---------                               -----------         ------                     CBC with platelets and d...[[id number]]  Abnormal            Final result                 Please view results for these tests on the individual orders.   Basic metabolic panel   Result Value Ref Range    Sodium 142 135 - 145 mmol/L    Potassium 3.6 3.4 - 5.3 mmol/L    Chloride 106 98 - 107 mmol/L    Carbon Dioxide (CO2) 23 22 - 29 mmol/L    Anion Gap 13 7 - 15 mmol/L    Urea Nitrogen 16.5 8.0 - 23.0 mg/dL    Creatinine 0.78 0.51 - 0.95 mg/dL    GFR Estimate 72 >60 mL/min/1.73m2    Calcium 9.6 8.8 - 10.2 mg/dL    Glucose 122 (H) 70 - 99 mg/dL   CBC with platelets and differential   Result Value Ref Range    WBC Count 6.4 4.0 - 11.0  10e3/uL    RBC Count 3.14 (L) 3.80 - 5.20 10e6/uL    Hemoglobin 9.9 (L) 11.7 - 15.7 g/dL    Hematocrit 31.0 (L) 35.0 - 47.0 %    MCV 99 78 - 100 fL    MCH 31.5 26.5 - 33.0 pg    MCHC 31.9 31.5 - 36.5 g/dL    RDW 14.9 10.0 - 15.0 %    Platelet Count 285 150 - 450 10e3/uL    % Neutrophils 74 %    % Lymphocytes 13 %    % Monocytes 8 %    % Eosinophils 4 %    % Basophils 1 %    % Immature Granulocytes 0 %    NRBCs per 100 WBC 0 <1 /100    Absolute Neutrophils 4.8 1.6 - 8.3 10e3/uL    Absolute Lymphocytes 0.8 0.8 - 5.3 10e3/uL    Absolute Monocytes 0.5 0.0 - 1.3 10e3/uL    Absolute Eosinophils 0.3 0.0 - 0.7 10e3/uL    Absolute Basophils 0.0 0.0 - 0.2 10e3/uL    Absolute Immature Granulocytes 0.0 <=0.4 10e3/uL    Absolute NRBCs 0.0 10e3/uL   Extra Tube    Narrative    The following orders were created for panel order Extra Tube.  Procedure                               Abnormality         Status                     ---------                               -----------         ------                     Extra Blue Top Tube[[id number]]                              Final result               Extra Red Top Tube[[id number]]                               Final result               Extra Heparinized Syringe[[id number]]                        Final result                 Please view results for these tests on the individual orders.   Extra Blue Top Tube   Result Value Ref Range    Hold Specimen JIC    Extra Red Top Tube   Result Value Ref Range    Hold Specimen JIC    Extra Heparinized Syringe   Result Value Ref Range    Hold Specimen JIC        Medications - No data to display    Assessments & Plan (with Medical Decision Making)     I have reviewed the nursing notes.    I have reviewed the findings, diagnosis, plan and need for follow up with the patient.      Summary:  Patient presents to the ER today fatigue and shortness of breath.  Potential diagnosis which have been considered and evaluated include anemia, chronic problem,  MI/NSTEMI, as well as others. Many of these have been excluded using the various modalities and assessment as noted on the chart. At the present time, the diagnosis given seems to be the most likely chronic fatigue.  Upon arrival, vitals signs show blood pressure 145/68 with a pulse 75.  Temperature 36.6  C.  Respirations 24 with oxygenation of 85% on room air.  The patient is alert and oriented no distress.  Lung sounds are clear.  With patient on oxygen, patient in no distress (patient is on chronic O2).  Cardiac examination does show systolic murmur heard best on right sternal border which is also chronic.  Patient is concerned about anemia due to this fatigue going on for multiple months.  No chest pain reported.  No shortness of breath out of the ordinary.  Lab work obtained showing WBC of 6.4 with hemoglobin 9.9.  Looking at old records patient baseline is 10 which is reassuring.  No renal or electrolyte abnormalities.  With patient having chronic fatigue for multiple months, no chest pain, cardiac workup per patient at [facility], no further examination is warranted.  For this reason we will discharge patient home to follow-up with her PCP in regards to the chronic fatigue.  Return to ER if new or worsening symptoms.  Patient verbalized understanding agrees to plan of care.  Patient discharged home.      Critical Care Time: None    Impression and plan discussed with patient. Questions answered, concerns addressed, indications for urgent re-evaluation reviewed, and  given. Patient/Parent/Caregiver agree with treatment plan and have no further questions at this time.  AVS provided at discharge.    This note was created by the Dragon Voice Dictation System. Inadvertent typographical errors, due to software recognition problems, may still exist.             Discharge Medication List as of 11/27/2023  5:24 PM          Final diagnoses:   Chronic fatigue       11/27/2023   [facility]        Roverto Evans, APRN CNP  11/27/23 1736

## 2023-11-27 NOTE — ED TRIAGE NOTES
"Pt in for evaluation of fatigue and chills over the last week. Reports she is supposed to be on oxygen at 1lpm but did not arrive with it on as \"I didn't want to drag it around with me\". Pt reports she had a tooth extracted last week and has not had any issues with this and is no on any abx at this time. Has a history of low hemoglobin and thinks she may need an iron infusion.         " Spinal Block    Pre-sedation assessment completed: 11/18/2019 7:30 AM    Patient location during procedure: OR  Start Time: 11/18/2019 7:40 AM  Stop Time: 11/18/2019 7:44 AM  Indication:at surgeon's request  Performed By  Anesthesiologist: Pinky June MD  Preanesthetic Checklist  Completed: patient identified, site marked, surgical consent, pre-op evaluation, timeout performed, IV checked, risks and benefits discussed and monitors and equipment checked  Spinal Block Prep:  Patient Position:sitting  Sterile Tech:cap, gloves and sterile barriers  Prep:Betadine  Patient Monitoring:EKG, continuous pulse oximetry and blood pressure monitoring  Spinal Block Procedure  Approach:midline  Guidance:palpation technique  Location:L4-L5  Needle Type:Sprotte  Needle Gauge:27 G  Placement of Spinal needle event:cerebrospinal fluid aspirated  Paresthesia: no  Fluid Appearance:clear  Medications: Morphine sulfate (PF) injection, 0.3 mg  bupivacaine PF (MARCAINE) injection 0.75%, 2 mL  fentaNYL Citrate (PF) (SUBLIMAZE) injection, 15 mcg  Med Administered at 11/18/2019 7:44 AM   Post Assessment  Patient Tolerance:patient tolerated the procedure well with no apparent complications  Complications no

## 2023-11-27 NOTE — DISCHARGE INSTRUCTIONS
Follow-up with your primary care provider for reevaluation.  Contact your primary care provider if you have any questions or concerns.  Do not hesitate to return to the ER if any new or worsening symptoms.     Please read the attached instructions (if any).  They highlight more specific treatments and interventions for you at home.              Thank you for letting me participate in your care and wish you a fast and uneventful recovery,    Roverto LYLE CNP    Do not hesitate to contact me with questions or concerns.  [email or web address]  [email or web address]

## 2024-10-09 ENCOUNTER — APPOINTMENT (OUTPATIENT)
Dept: GENERAL RADIOLOGY | Facility: HOSPITAL | Age: 89
End: 2024-10-09
Attending: INTERNAL MEDICINE
Payer: MEDICARE

## 2024-10-09 ENCOUNTER — APPOINTMENT (OUTPATIENT)
Dept: CT IMAGING | Facility: HOSPITAL | Age: 89
End: 2024-10-09
Attending: INTERNAL MEDICINE
Payer: MEDICARE

## 2024-10-09 ENCOUNTER — HOSPITAL ENCOUNTER (OUTPATIENT)
Facility: HOSPITAL | Age: 89
Setting detail: OBSERVATION
Discharge: HOME | End: 2024-10-10
Attending: INTERNAL MEDICINE | Admitting: INTERNAL MEDICINE
Payer: MEDICARE

## 2024-10-09 DIAGNOSIS — R50.9 FEBRILE ILLNESS: ICD-10-CM

## 2024-10-09 DIAGNOSIS — K56.41 FECAL IMPACTION (H): ICD-10-CM

## 2024-10-09 LAB
ALBUMIN SERPL BCG-MCNC: 4.3 G/DL (ref 3.5–5.2)
ALBUMIN UR-MCNC: 100 MG/DL
ALP SERPL-CCNC: 109 U/L (ref 40–150)
ALT SERPL W P-5'-P-CCNC: 27 U/L (ref 0–50)
ANION GAP SERPL CALCULATED.3IONS-SCNC: 15 MMOL/L (ref 7–15)
APPEARANCE UR: CLEAR
AST SERPL W P-5'-P-CCNC: 30 U/L (ref 0–45)
BASOPHILS # BLD AUTO: 0 10E3/UL (ref 0–0.2)
BASOPHILS NFR BLD AUTO: 0 %
BILIRUB SERPL-MCNC: 0.7 MG/DL
BILIRUB UR QL STRIP: NEGATIVE
BUN SERPL-MCNC: 20.3 MG/DL (ref 8–23)
CALCIUM SERPL-MCNC: 9.4 MG/DL (ref 8.8–10.4)
CHLORIDE SERPL-SCNC: 100 MMOL/L (ref 98–107)
COLOR UR AUTO: YELLOW
CREAT SERPL-MCNC: 0.83 MG/DL (ref 0.51–0.95)
CRP SERPL-MCNC: <3 MG/L
EGFRCR SERPLBLD CKD-EPI 2021: 67 ML/MIN/1.73M2
EOSINOPHIL # BLD AUTO: 0 10E3/UL (ref 0–0.7)
EOSINOPHIL NFR BLD AUTO: 0 %
ERYTHROCYTE [DISTWIDTH] IN BLOOD BY AUTOMATED COUNT: 13.2 % (ref 10–15)
FLUAV RNA SPEC QL NAA+PROBE: NEGATIVE
FLUBV RNA RESP QL NAA+PROBE: NEGATIVE
GLUCOSE SERPL-MCNC: 144 MG/DL (ref 70–99)
GLUCOSE UR STRIP-MCNC: NEGATIVE MG/DL
HCO3 SERPL-SCNC: 21 MMOL/L (ref 22–29)
HCT VFR BLD AUTO: 39.1 % (ref 35–47)
HGB BLD-MCNC: 12.9 G/DL (ref 11.7–15.7)
HGB UR QL STRIP: NEGATIVE
HOLD SPECIMEN: NORMAL
HYALINE CASTS: 2 /LPF
IMM GRANULOCYTES # BLD: 0.1 10E3/UL
IMM GRANULOCYTES NFR BLD: 1 %
KETONES UR STRIP-MCNC: NEGATIVE MG/DL
LACTATE SERPL-SCNC: 2.1 MMOL/L (ref 0.7–2)
LEUKOCYTE ESTERASE UR QL STRIP: NEGATIVE
LYMPHOCYTES # BLD AUTO: 0.6 10E3/UL (ref 0.8–5.3)
LYMPHOCYTES NFR BLD AUTO: 3 %
MCH RBC QN AUTO: 30.9 PG (ref 26.5–33)
MCHC RBC AUTO-ENTMCNC: 33 G/DL (ref 31.5–36.5)
MCV RBC AUTO: 94 FL (ref 78–100)
MONOCYTES # BLD AUTO: 0.7 10E3/UL (ref 0–1.3)
MONOCYTES NFR BLD AUTO: 4 %
MUCOUS THREADS #/AREA URNS LPF: PRESENT /LPF
NEUTROPHILS # BLD AUTO: 18.4 10E3/UL (ref 1.6–8.3)
NEUTROPHILS NFR BLD AUTO: 93 %
NITRATE UR QL: NEGATIVE
NRBC # BLD AUTO: 0 10E3/UL
NRBC BLD AUTO-RTO: 0 /100
PH UR STRIP: 6 [PH] (ref 4.7–8)
PLATELET # BLD AUTO: 236 10E3/UL (ref 150–450)
POTASSIUM SERPL-SCNC: 4.3 MMOL/L (ref 3.4–5.3)
PROCALCITONIN SERPL IA-MCNC: 0.05 NG/ML
PROT SERPL-MCNC: 6.8 G/DL (ref 6.4–8.3)
RBC # BLD AUTO: 4.18 10E6/UL (ref 3.8–5.2)
RBC URINE: 1 /HPF
RSV RNA SPEC NAA+PROBE: NEGATIVE
SARS-COV-2 RNA RESP QL NAA+PROBE: NEGATIVE
SODIUM SERPL-SCNC: 136 MMOL/L (ref 135–145)
SP GR UR STRIP: 1.02 (ref 1–1.03)
SQUAMOUS EPITHELIAL: 1 /HPF
UROBILINOGEN UR STRIP-MCNC: NORMAL MG/DL
WBC # BLD AUTO: 19.9 10E3/UL (ref 4–11)
WBC URINE: 1 /HPF
YEAST #/AREA URNS HPF: ABNORMAL /HPF

## 2024-10-09 PROCEDURE — 96365 THER/PROPH/DIAG IV INF INIT: CPT

## 2024-10-09 PROCEDURE — 99285 EMERGENCY DEPT VISIT HI MDM: CPT | Mod: 25

## 2024-10-09 PROCEDURE — 250N000011 HC RX IP 250 OP 636: Performed by: INTERNAL MEDICINE

## 2024-10-09 PROCEDURE — 250N000013 HC RX MED GY IP 250 OP 250 PS 637: Performed by: INTERNAL MEDICINE

## 2024-10-09 PROCEDURE — 84145 PROCALCITONIN (PCT): CPT | Performed by: INTERNAL MEDICINE

## 2024-10-09 PROCEDURE — 96361 HYDRATE IV INFUSION ADD-ON: CPT

## 2024-10-09 PROCEDURE — 85025 COMPLETE CBC W/AUTO DIFF WBC: CPT | Performed by: INTERNAL MEDICINE

## 2024-10-09 PROCEDURE — 87637 SARSCOV2&INF A&B&RSV AMP PRB: CPT | Performed by: INTERNAL MEDICINE

## 2024-10-09 PROCEDURE — 74177 CT ABD & PELVIS W/CONTRAST: CPT | Mod: MG

## 2024-10-09 PROCEDURE — 80053 COMPREHEN METABOLIC PANEL: CPT | Performed by: INTERNAL MEDICINE

## 2024-10-09 PROCEDURE — 258N000003 HC RX IP 258 OP 636: Performed by: INTERNAL MEDICINE

## 2024-10-09 PROCEDURE — 36415 COLL VENOUS BLD VENIPUNCTURE: CPT | Performed by: STUDENT IN AN ORGANIZED HEALTH CARE EDUCATION/TRAINING PROGRAM

## 2024-10-09 PROCEDURE — 71045 X-RAY EXAM CHEST 1 VIEW: CPT

## 2024-10-09 PROCEDURE — 86140 C-REACTIVE PROTEIN: CPT | Performed by: INTERNAL MEDICINE

## 2024-10-09 PROCEDURE — 99285 EMERGENCY DEPT VISIT HI MDM: CPT | Performed by: INTERNAL MEDICINE

## 2024-10-09 PROCEDURE — G0378 HOSPITAL OBSERVATION PER HR: HCPCS

## 2024-10-09 PROCEDURE — 87040 BLOOD CULTURE FOR BACTERIA: CPT | Performed by: INTERNAL MEDICINE

## 2024-10-09 PROCEDURE — 83605 ASSAY OF LACTIC ACID: CPT | Performed by: INTERNAL MEDICINE

## 2024-10-09 PROCEDURE — 81001 URINALYSIS AUTO W/SCOPE: CPT | Performed by: INTERNAL MEDICINE

## 2024-10-09 RX ORDER — BISACODYL 10 MG
10 SUPPOSITORY, RECTAL RECTAL ONCE
Status: COMPLETED | OUTPATIENT
Start: 2024-10-09 | End: 2024-10-09

## 2024-10-09 RX ORDER — CEFTRIAXONE SODIUM 2 G/50ML
2 INJECTION, SOLUTION INTRAVENOUS ONCE
Status: COMPLETED | OUTPATIENT
Start: 2024-10-09 | End: 2024-10-09

## 2024-10-09 RX ORDER — IOPAMIDOL 755 MG/ML
60 INJECTION, SOLUTION INTRAVASCULAR ONCE
Status: COMPLETED | OUTPATIENT
Start: 2024-10-09 | End: 2024-10-09

## 2024-10-09 RX ORDER — SODIUM PHOSPHATE,MONO-DIBASIC 19G-7G/118
1 ENEMA (ML) RECTAL ONCE
Status: DISCONTINUED | OUTPATIENT
Start: 2024-10-09 | End: 2024-10-09

## 2024-10-09 RX ADMIN — BISACODYL 10 MG: 10 SUPPOSITORY RECTAL at 21:19

## 2024-10-09 RX ADMIN — SODIUM CHLORIDE 500 ML: 9 INJECTION, SOLUTION INTRAVENOUS at 19:32

## 2024-10-09 RX ADMIN — IOPAMIDOL 60 ML: 755 INJECTION, SOLUTION INTRAVENOUS at 20:37

## 2024-10-09 RX ADMIN — CEFTRIAXONE SODIUM 2 G: 2 INJECTION, SOLUTION INTRAVENOUS at 20:50

## 2024-10-09 ASSESSMENT — ENCOUNTER SYMPTOMS
PALPITATIONS: 0
HEMATURIA: 0
VOICE CHANGE: 0
NECK STIFFNESS: 0
WHEEZING: 0
ABDOMINAL DISTENTION: 0
CHEST TIGHTNESS: 0
BACK PAIN: 0
WOUND: 0
DIAPHORESIS: 0
FEVER: 1
DIZZINESS: 0
FLANK PAIN: 0
BLOOD IN STOOL: 0
FATIGUE: 1
COUGH: 0
LIGHT-HEADEDNESS: 0
NAUSEA: 0
ABDOMINAL PAIN: 0
CONSTIPATION: 1
VOMITING: 0
NUMBNESS: 0
CONFUSION: 0
ARTHRALGIAS: 0
DYSURIA: 0
ANAL BLEEDING: 0
CHILLS: 1
RECTAL PAIN: 1
SHORTNESS OF BREATH: 0
MYALGIAS: 0
COLOR CHANGE: 0
HEADACHES: 0
NECK PAIN: 0

## 2024-10-09 ASSESSMENT — COLUMBIA-SUICIDE SEVERITY RATING SCALE - C-SSRS
2. HAVE YOU ACTUALLY HAD ANY THOUGHTS OF KILLING YOURSELF IN THE PAST MONTH?: NO
1. IN THE PAST MONTH, HAVE YOU WISHED YOU WERE DEAD OR WISHED YOU COULD GO TO SLEEP AND NOT WAKE UP?: NO
6. HAVE YOU EVER DONE ANYTHING, STARTED TO DO ANYTHING, OR PREPARED TO DO ANYTHING TO END YOUR LIFE?: NO

## 2024-10-09 ASSESSMENT — ACTIVITIES OF DAILY LIVING (ADL)
ADLS_ACUITY_SCORE: 38
ADLS_ACUITY_SCORE: 38
ADLS_ACUITY_SCORE: 36
ADLS_ACUITY_SCORE: 38
ADLS_ACUITY_SCORE: 38

## 2024-10-09 NOTE — ED TRIAGE NOTES
Pt reports to have chills and rigors this afternoon. Pt reports she feels impacted and basically just running liquid.  Last BM was yesterday but normal per pt.        Pt stated she wears O2 but didn't bring it when she left to come to the hospital.

## 2024-10-10 VITALS
OXYGEN SATURATION: 92 % | HEIGHT: 59 IN | HEART RATE: 70 BPM | TEMPERATURE: 97.4 F | BODY MASS INDEX: 23.64 KG/M2 | SYSTOLIC BLOOD PRESSURE: 134 MMHG | DIASTOLIC BLOOD PRESSURE: 53 MMHG | RESPIRATION RATE: 18 BRPM | WEIGHT: 117.28 LBS

## 2024-10-10 LAB
ANION GAP SERPL CALCULATED.3IONS-SCNC: 9 MMOL/L (ref 7–15)
BUN SERPL-MCNC: 17 MG/DL (ref 8–23)
CALCIUM SERPL-MCNC: 8.5 MG/DL (ref 8.8–10.4)
CHLORIDE SERPL-SCNC: 106 MMOL/L (ref 98–107)
CREAT SERPL-MCNC: 0.84 MG/DL (ref 0.51–0.95)
EGFRCR SERPLBLD CKD-EPI 2021: 66 ML/MIN/1.73M2
ERYTHROCYTE [DISTWIDTH] IN BLOOD BY AUTOMATED COUNT: 13.7 % (ref 10–15)
GLUCOSE SERPL-MCNC: 88 MG/DL (ref 70–99)
HCO3 SERPL-SCNC: 24 MMOL/L (ref 22–29)
HCT VFR BLD AUTO: 32.8 % (ref 35–47)
HGB BLD-MCNC: 10.9 G/DL (ref 11.7–15.7)
LACTATE SERPL-SCNC: 0.6 MMOL/L (ref 0.7–2)
MCH RBC QN AUTO: 31.1 PG (ref 26.5–33)
MCHC RBC AUTO-ENTMCNC: 33.2 G/DL (ref 31.5–36.5)
MCV RBC AUTO: 93 FL (ref 78–100)
PLATELET # BLD AUTO: 181 10E3/UL (ref 150–450)
POTASSIUM SERPL-SCNC: 4.2 MMOL/L (ref 3.4–5.3)
RBC # BLD AUTO: 3.51 10E6/UL (ref 3.8–5.2)
SODIUM SERPL-SCNC: 139 MMOL/L (ref 135–145)
WBC # BLD AUTO: 14.6 10E3/UL (ref 4–11)

## 2024-10-10 PROCEDURE — 99235 HOSP IP/OBS SAME DATE MOD 70: CPT | Performed by: INTERNAL MEDICINE

## 2024-10-10 PROCEDURE — 83605 ASSAY OF LACTIC ACID: CPT | Performed by: INTERNAL MEDICINE

## 2024-10-10 PROCEDURE — 250N000013 HC RX MED GY IP 250 OP 250 PS 637: Performed by: INTERNAL MEDICINE

## 2024-10-10 PROCEDURE — 36415 COLL VENOUS BLD VENIPUNCTURE: CPT | Performed by: INTERNAL MEDICINE

## 2024-10-10 PROCEDURE — 80048 BASIC METABOLIC PNL TOTAL CA: CPT | Performed by: INTERNAL MEDICINE

## 2024-10-10 PROCEDURE — G0378 HOSPITAL OBSERVATION PER HR: HCPCS

## 2024-10-10 PROCEDURE — 85027 COMPLETE CBC AUTOMATED: CPT | Performed by: INTERNAL MEDICINE

## 2024-10-10 RX ORDER — AMOXICILLIN 250 MG
1 CAPSULE ORAL 2 TIMES DAILY PRN
Status: DISCONTINUED | OUTPATIENT
Start: 2024-10-10 | End: 2024-10-10 | Stop reason: HOSPADM

## 2024-10-10 RX ORDER — POLYETHYLENE GLYCOL 3350 17 G/17G
1 POWDER, FOR SOLUTION ORAL 2 TIMES DAILY
COMMUNITY

## 2024-10-10 RX ORDER — FERROUS SULFATE 325(65) MG
1 TABLET ORAL
COMMUNITY
Start: 2024-09-10

## 2024-10-10 RX ORDER — AMOXICILLIN 250 MG
1 CAPSULE ORAL DAILY
Qty: 30 TABLET | Refills: 0 | Status: SHIPPED | OUTPATIENT
Start: 2024-10-10

## 2024-10-10 RX ORDER — ASPIRIN 81 MG/1
81 TABLET ORAL EVERY MORNING
Status: DISCONTINUED | OUTPATIENT
Start: 2024-10-10 | End: 2024-10-10 | Stop reason: HOSPADM

## 2024-10-10 RX ORDER — CLOPIDOGREL BISULFATE 75 MG/1
75 TABLET ORAL EVERY MORNING
Status: DISCONTINUED | OUTPATIENT
Start: 2024-10-10 | End: 2024-10-10 | Stop reason: HOSPADM

## 2024-10-10 RX ORDER — POLYETHYLENE GLYCOL 3350 17 G/17G
17 POWDER, FOR SOLUTION ORAL 2 TIMES DAILY
Status: DISCONTINUED | OUTPATIENT
Start: 2024-10-10 | End: 2024-10-10 | Stop reason: HOSPADM

## 2024-10-10 RX ORDER — ISOSORBIDE MONONITRATE 30 MG/1
30 TABLET, EXTENDED RELEASE ORAL EVERY MORNING
Status: DISCONTINUED | OUTPATIENT
Start: 2024-10-10 | End: 2024-10-10 | Stop reason: HOSPADM

## 2024-10-10 RX ORDER — AMOXICILLIN 250 MG
2 CAPSULE ORAL 2 TIMES DAILY PRN
Status: DISCONTINUED | OUTPATIENT
Start: 2024-10-10 | End: 2024-10-10 | Stop reason: HOSPADM

## 2024-10-10 RX ORDER — POLYETHYLENE GLYCOL 3350 17 G/17G
17 POWDER, FOR SOLUTION ORAL DAILY
Status: DISCONTINUED | OUTPATIENT
Start: 2024-10-10 | End: 2024-10-10

## 2024-10-10 RX ORDER — METOPROLOL SUCCINATE 25 MG/1
25 TABLET, EXTENDED RELEASE ORAL EVERY MORNING
Status: DISCONTINUED | OUTPATIENT
Start: 2024-10-10 | End: 2024-10-10 | Stop reason: HOSPADM

## 2024-10-10 RX ORDER — CETIRIZINE HYDROCHLORIDE 5 MG/1
10 TABLET ORAL DAILY
Status: DISCONTINUED | OUTPATIENT
Start: 2024-10-10 | End: 2024-10-10

## 2024-10-10 RX ORDER — AMLODIPINE BESYLATE 5 MG/1
10 TABLET ORAL EVERY MORNING
Status: DISCONTINUED | OUTPATIENT
Start: 2024-10-10 | End: 2024-10-10 | Stop reason: HOSPADM

## 2024-10-10 RX ORDER — UBIDECARENONE 75 MG
100 CAPSULE ORAL EVERY MORNING
Status: DISCONTINUED | OUTPATIENT
Start: 2024-10-10 | End: 2024-10-10 | Stop reason: HOSPADM

## 2024-10-10 RX ORDER — CALCIUM CARBONATE 500 MG/1
1000 TABLET, CHEWABLE ORAL 4 TIMES DAILY PRN
Status: DISCONTINUED | OUTPATIENT
Start: 2024-10-10 | End: 2024-10-10 | Stop reason: HOSPADM

## 2024-10-10 RX ORDER — CEFTRIAXONE SODIUM 2 G/50ML
2 INJECTION, SOLUTION INTRAVENOUS EVERY 24 HOURS
Status: DISCONTINUED | OUTPATIENT
Start: 2024-10-10 | End: 2024-10-10

## 2024-10-10 RX ORDER — LIDOCAINE 40 MG/G
CREAM TOPICAL
Status: DISCONTINUED | OUTPATIENT
Start: 2024-10-10 | End: 2024-10-10 | Stop reason: HOSPADM

## 2024-10-10 RX ORDER — ATORVASTATIN CALCIUM 20 MG/1
40 TABLET, FILM COATED ORAL AT BEDTIME
Status: DISCONTINUED | OUTPATIENT
Start: 2024-10-10 | End: 2024-10-10 | Stop reason: HOSPADM

## 2024-10-10 RX ADMIN — Medication 100 MCG: at 08:51

## 2024-10-10 RX ADMIN — METOPROLOL SUCCINATE 25 MG: 25 TABLET, EXTENDED RELEASE ORAL at 08:52

## 2024-10-10 RX ADMIN — ASPIRIN 81 MG: 81 TABLET, COATED ORAL at 08:52

## 2024-10-10 RX ADMIN — ATORVASTATIN CALCIUM 40 MG: 20 TABLET, FILM COATED ORAL at 02:09

## 2024-10-10 RX ADMIN — CLOPIDOGREL BISULFATE 75 MG: 75 TABLET ORAL at 08:52

## 2024-10-10 RX ADMIN — POLYETHYLENE GLYCOL 3350 17 G: 17 POWDER, FOR SOLUTION ORAL at 09:16

## 2024-10-10 RX ADMIN — LOSARTAN POTASSIUM 12.5 MG: 25 TABLET, FILM COATED ORAL at 02:09

## 2024-10-10 RX ADMIN — ISOSORBIDE MONONITRATE 30 MG: 30 TABLET, EXTENDED RELEASE ORAL at 08:52

## 2024-10-10 RX ADMIN — AMLODIPINE BESYLATE 10 MG: 5 TABLET ORAL at 08:52

## 2024-10-10 ASSESSMENT — ACTIVITIES OF DAILY LIVING (ADL)
ADLS_ACUITY_SCORE: 23

## 2024-10-10 NOTE — PLAN OF CARE
Goal Outcome Evaluation:  [facility] Inpatient Admission Note:    Patient admitted to 3214/3214-1 at approximately 0030 via ambulation accompanied by transport tech from emergency room . Report received from Анна SHAH in SBAR format at 0010 via telephone. Patient ambulated to bed via self.. Patient is alert and oriented X 3, denies pain; rates at 0 on 0-10 scale.  Patient oriented to room, unit, hourly rounding, and plan of care. Explained admission packet and patient handbook with patient bill of rights brochure. Will continue to monitor and document as needed.     Inpatient Nursing criteria listed below was met:    Health care directives status obtained and documented: Yes    Patient identifies a surrogate decision maker: No If yes, who:na Contact Information:na     If initial lactic acid greater than 2.0, repeat lactic acid drawn within one hour of arrival to unit: Yes. If no, state reason: yes    Clergy visit ordered if patient requests: N/A    Skin issues/needs documented: No    Isolation Patient: no Education given, correct sign in place and documentation row added to PCS:  No    Fall Prevention Yes: Care plan updated, education given and documented, sticker and magnet in place: Yes    Care Plan initiated: Yes    Education Documented (including assessment): Yes    Patient has discharge needs : No If yes, please explain:na     Admit to medical floor, A/O, VSS, denies pain or nausea. SL patent. Up to bathroom to void. O2 on via NC at 1lpm as she wears at home. Face to face report given with opportunity to observe patient.    Report given to Geri SHAH, Samantha Arreguin RN   10/10/2024  7:05 AM

## 2024-10-10 NOTE — PROGRESS NOTES
Medical record and Nelson Score reviewed. Participated in interdisciplinary rounds.  No apparent needs noted at this time. Care Transitions will remain available if needs arise.       Shanelle Thayer CM

## 2024-10-10 NOTE — ED PROVIDER NOTES
I received a call from radiology this morning that [facility] had not noted a pulmonary nodule on the CT last night. Pt is currently admitted. I subsequently called Dr. Foster and made him aware.     Uvaldo Moreland MD  10/10/24 0714

## 2024-10-10 NOTE — PROGRESS NOTES
[facility]    Hospitalist Progress Note    Patient is a 89-year-old female who lives independently who presented to the ER for evaluation of some subjective fevers and rigors and chills at home.  Also was having issues with her bowel movements was having just liquid coming out and she felt that she was probably impacted.  Upon arrival to the ER she did have a temperature of 100.8 degrees.  Blood pressure was initially 174/70 2 repeat 151/67.  Heart rate 108.  Sats are in 75% room air.  Normally she is on oxygen at home.  Repeat was noted to be 96%.  She had a fairly significant workup in the ER.  White count was elevated 19,900 hemoglobin 12.9 renal function normal LFTs normal lactic acid mild elevation 144 lactic acid was 2.1 procalcitonin 0.05.  Urinalysis was unremarkable.  CT scan of abdomen pelvis also unremarkable except for a fair amount of stool.  She was felt to have some impaction she was disimpacted in the ER had a significant bowel movement while she was down there.  Blood culture was performed she was empirically given a dose of Rocephin.  She was subsequent admitted because of her fever.  Was not continued on antibiotics.  Does have a significant history of coronary artery disease with previous angioplasty is performed.  Does have a pacemaker in place.  Also does have some vascular disease with carotid artery stenosis however was evaluated by vascular surgery they did not recommend any interventions.  Does have underlying COPD is on oxygen at home also.    ASSESSMENT/PLAN:    1.  Fever: Really do not have a good etiology of this.  Perhaps was due to her impaction.  Urine is clear and CT abdomen pelvis unremarkable.  Did note elevated white count.  Did receive a dose of Rocephin IV for not clear reasons.  Chest x-ray was clear no obvious pulmonary issues.  She is afebrile this morning.  Repeat white count is down to 14,600.  Will see how she is this morning here in general do not feel  continue antibiotics is warranted at this point.  She feels well.  Exam is unremarkable.  Will not continue antibiotics at this point.    2.  Constipation/fecal impaction.  CT did not show any marked abnormalities.  Did have a very large bowel movement.  She feels markedly better.  Does take MiraLAX twice a day.  Usually eats a lot of fruit.  Will have some Senokot available as needed on top of her usual regimen.    3.  Pacemaker placement: Has been checked and followed no issues.    4.  COPD: Is on oxygen at home.  Current sats are variable 87 to 95%.  She does wear it at home and at times based on notes from the clinic with drop down in the 70% range with exertion come back up easily.  X-ray did not show any acute abnormalities or some chronic interstitial changes.  Denies any shortness of breath at all has not any cough or any signs of exacerbation.    5.  Disposition.  Patient will likely return home.  After breakfast we will get her up ambulate see how she does if does well we will likely be able to send her home today.  She is in agreement with that plan      Diet: Combination Diet Regular Diet Adult  Fluids: none    DVT Prophylaxis: Pneumatic Compression Devices  Code Status: Full Code    Disposition: Expected discharge possibly later today    Gerardo Foster MD    Interval History   No issues overnight.  Afebrile today labs look good white count is down.  She feels markedly better.  Will feed her ambulate and see how she does with anticipated discharge home later today.    -Data reviewed today: I reviewed all new labs and imaging results over the last 24 hours. I personally reviewed no images or EKG's today.    Physical Exam   Temp: 99  F (37.2  C) Temp src: Tympanic BP: 131/55 Pulse: 65   Resp: 16 SpO2: (!) 91 % O2 Device: Nasal cannula Oxygen Delivery: 1 LPM  Vitals:    10/10/24 0042   Weight: 53.2 kg (117 lb 4.6 oz)     Vital Signs with Ranges  Temp:  [97.4  F (36.3  C)-100.8  F (38.2  C)] 99  F (37.2   C)  Pulse:  [] 65  Resp:  [13-26] 16  BP: (124-174)/(43-77) 131/55  SpO2:  [75 %-98 %] 91 %  I/O last 3 completed shifts:  In: 200 [P.O.:200]  Out: -     Peripheral IV 10/09/24 Left Antecubital fossa (Active)   Site Assessment WDL 10/10/24 0742   Line Status Saline locked 10/10/24 0742   Dressing Transparent 10/10/24 0742   Dressing Status clean;dry;intact 10/10/24 0742   Line Intervention Flushed 10/10/24 0400   Phlebitis Scale 0-->no symptoms 10/10/24 0742   Infiltration? no 10/10/24 0742   Number of days: 1     No line/device    Constitutional: Alert and oriented x3. No distress    HEENT: Normocephalic/atraumatic, PERRL, EOMI, mouth moist, neck supple, no LN.     Cardiovascular: RRR. no Murmur, no  rubs, or gallops.  JVD no.  Bruits no.  Pulses 2    Respiratory: Generally decreased due to her COPD.  No wheezes or consolidation.       Abdomen: Soft, nontender, nondistended, no organomegaly. Bowel sounds present    Extremities: Warm/dry. No edema    Neuro:   Non focal, cranial nerves intact, Moves all extremities.  Medications   Current Facility-Administered Medications   Medication Dose Route Frequency Provider Last Rate Last Admin     Current Facility-Administered Medications   Medication Dose Route Frequency Provider Last Rate Last Admin    amLODIPine (NORVASC) tablet 10 mg  10 mg Oral Herminio Schwartz MD        aspirin EC tablet 81 mg  81 mg Oral Herminio Schwartz MD        atorvastatin (LIPITOR) tablet 40 mg  40 mg Oral At Bedtime Herminio Garibay MD   40 mg at 10/10/24 0209    clopidogrel (PLAVIX) tablet 75 mg  75 mg Oral Herminio Schwartz MD        cyanocobalamin (VITAMIN B-12) tablet 100 mcg  100 mcg Oral Herminio Schwartz MD        isosorbide mononitrate (IMDUR) 24 hr tablet 30 mg  30 mg Oral Herminio Schwartz MD        losartan (COZAAR) half-tab 12.5 mg  12.5 mg Oral At Bedtime Herminio Garibay MD   12.5 mg at 10/10/24 0209    metoprolol succinate ER (TOPROL XL) 24 hr tablet 25 mg  25 mg Oral Herminio Schwartz,  MD        polyethylene glycol (MIRALAX) Packet 17 g  17 g Oral BID Gerardo Foster MD        sodium chloride (PF) 0.9% PF flush 3 mL  3 mL Intracatheter Q8H Herminio Garibay MD           Data   Recent Labs   Lab 10/10/24  0514 10/09/24  1917   WBC 14.6* 19.9*   HGB 10.9* 12.9   MCV 93 94    236    136   POTASSIUM 4.2 4.3   CHLORIDE 106 100   CO2 24 21*   BUN 17.0 20.3   CR 0.84 0.83   ANIONGAP 9 15   CATARINA 8.5* 9.4   GLC 88 144*   ALBUMIN  --  4.3   PROTTOTAL  --  6.8   BILITOTAL  --  0.7   ALKPHOS  --  109   ALT  --  27   AST  --  30       Recent Results (from the past 24 hour(s))   XR Chest Port 1 View    Narrative    PROCEDURE:  XR CHEST PORT 1 VIEW    HISTORY:  fever.     COMPARISON:  2022    FINDINGS:   The cardiac silhouette is normal in size. The pulmonary vasculature is  normal.  There is interstitial thickening seen in both lungs stable  from previous examination. No pleural effusion or pneumothorax.      Impression    IMPRESSION:  Chronic interstitial thickening. No active pulmonary  infiltrates.      SHAAN BAZZI MD         SYSTEM ID:  [id number]   Abd/pelvis CT - IV contrast only TRAUMA / AAA    Narrative    EXAMINATION: CT ABDOMEN PELVIS W CONTRAST, 10/9/2024 8:49 PM    TECHNIQUE:  Helical CT images from the lung bases through the  symphysis pubis were obtained  with IV contrast. Contrast dose: Isovue  370 60ml    COMPARISON: none    HISTORY: fever, abd pain    FINDINGS:    There is a 14 mm diameter nodule seen in the right lower lobe. This  nodule is incompletely visualized. CT scan of the chest is  recommended. The nodule was not seen on a previous examination in  2019.    The liver is free of masses or biliary ductal enlargement.  calcified  gallstones are seen.    The the spleen and pancreas appear normal.    The adrenal glands are normal.    The right and left kidneys are free of masses or hydronephrosis.    The periaortic lymph nodes are normal in caliber. There is a 3.5 cm  infrarenal  abdominal aortic aneurysm.    No intraperitoneal masses or inflammatory changes are noted.    In the pelvis the bladder appears normal. The rectum is distended with  feces Degenerative changes are present in the lumbar spine. 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      Impression    IMPRESSION: 1.4 cm in diameter right lower lobe nodule further  evaluation with CT scan is recommended.    3.5 cm infrarenal abdominal aortic aneurysm.    Cholelithiasis     This report was called to the emergency room at 7:40 AM    SHAAN BAZZI MD         SYSTEM ID:  [id number]

## 2024-10-10 NOTE — PLAN OF CARE
"  Problem: Adult Inpatient Plan of Care  Goal: Plan of Care Review  Description: The Plan of Care Review/Shift note should be completed every shift.  The Outcome Evaluation is a brief statement about your assessment that the patient is improving, declining, or no change.  This information will be displayed automatically on your shift  note.  Outcome: Adequate for Care Transition  Flowsheets (Taken 10/10/2024 1024)  Plan of Care Reviewed With: patient  Overall Patient Progress: improving  Goal: Patient-Specific Goal (Individualized)  Description: You can add care plan individualizations to a care plan. Examples of Individualization might be:  \"Parent requests to be called daily at 9am for status\", \"I have a hard time hearing out of my right ear\", or \"Do not touch me to wake me up as it startles  me\".  Outcome: Adequate for Care Transition  Goal: Absence of Hospital-Acquired Illness or Injury  Outcome: Adequate for Care Transition  Intervention: Identify and Manage Fall Risk  Recent Flowsheet Documentation  Taken 10/10/2024 1024 by Geri Macias RN  Safety Promotion/Fall Prevention:   activity supervised   clutter free environment maintained   increase visualization of patient   nonskid shoes/slippers when out of bed   patient and family education   room door open   room near nurse's station   supervised activity   treat reversible contributory factors  Taken 10/10/2024 0738 by Geri Macias, RN  Safety Promotion/Fall Prevention:   treat underlying cause   treat reversible contributory factors   nonskid shoes/slippers when out of bed   clutter free environment maintained  Intervention: Prevent Skin Injury  Recent Flowsheet Documentation  Taken 10/10/2024 0900 by Geri Macias, RN  Body Position: position changed independently  Taken 10/10/2024 0738 by Geri Macias, RN  Body Position: position changed independently  Intervention: Prevent and Manage VTE (Venous Thromboembolism) Risk  Recent Flowsheet " Documentation  Taken 10/10/2024 0738 by Geri Macias RN  VTE Prevention/Management: (plavix) other (see comments)  Intervention: Prevent Infection  Recent Flowsheet Documentation  Taken 10/10/2024 0738 by Geri Macias RN  Infection Prevention:   single patient room provided   rest/sleep promoted   hand hygiene promoted   equipment surfaces disinfected   environmental surveillance performed  Goal: Optimal Comfort and Wellbeing  Outcome: Adequate for Care Transition  Goal: Readiness for Transition of Care  Outcome: Adequate for Care Transition     Problem: Pain Acute  Goal: Optimal Pain Control and Function  Outcome: Adequate for Care Transition  Intervention: Prevent or Manage Pain  Recent Flowsheet Documentation  Taken 10/10/2024 1024 by Geri Macias RN  Medication Review/Management: medications reviewed  Taken 10/10/2024 0738 by Geri Macias RN  Medication Review/Management:   medications reviewed   dosing adjusted   high-risk medications identified     Problem: Comorbidity Management  Goal: Blood Pressure in Desired Range  Outcome: Adequate for Care Transition  Intervention: Maintain Blood Pressure Management  Recent Flowsheet Documentation  Taken 10/10/2024 1024 by Geri Macias RN  Medication Review/Management: medications reviewed  Taken 10/10/2024 0738 by Geri Macias RN  Medication Review/Management:   medications reviewed   dosing adjusted   high-risk medications identified     Problem: Infection  Goal: Absence of Infection Signs and Symptoms  Outcome: Adequate for Care Transition  Intervention: Prevent or Manage Infection  Flowsheets (Taken 10/10/2024 1024)  Infection Management: aseptic technique maintained     Problem: Fall Injury Risk  Goal: Absence of Fall and Fall-Related Injury  Outcome: Adequate for Care Transition  Intervention: Identify and Manage Contributors  Flowsheets  Taken 10/10/2024 1024  Self-Care Promotion: independence encouraged  Medication Review/Management:  medications reviewed  Taken 10/10/2024 0738  Medication Review/Management:   medications reviewed   dosing adjusted   high-risk medications identified  Intervention: Promote Injury-Free Environment  Flowsheets  Taken 10/10/2024 1024  Safety Promotion/Fall Prevention:   activity supervised   clutter free environment maintained   increase visualization of patient   nonskid shoes/slippers when out of bed   patient and family education   room door open   room near nurse's station   supervised activity   treat reversible contributory factors  Taken 10/10/2024 0738  Safety Promotion/Fall Prevention:   treat underlying cause   treat reversible contributory factors   nonskid shoes/slippers when out of bed   clutter free environment maintained   Goal Outcome Evaluation:      Plan of Care Reviewed With: patient    Overall Patient Progress: improvingOverall Patient Progress: improving

## 2024-10-10 NOTE — DISCHARGE SUMMARY
[facility]  Hospitalist Discharge Summary      Date of Admission:  10/9/2024  Date of Discharge:  10/10/2024  2:28 PM  Discharging Provider: Gerardo Foster MD  Discharge Service: Hospitalist Service    Discharge Diagnoses     Fecal impaction  Constipation  Fever  COPD  Chronic respiratory failure with hypoxia  Status post pacemaker placement  Coronary artery disease  Carotid artery disease    Clinically Significant Risk Factors          Follow-ups Needed After Discharge   Follow-up Appointments     Follow-up and recommended labs and tests       Follow up with primary care provider, Johan Huggins, within 7 days   for hospital follow- up.  No follow up labs or test are needed.        None    Unresulted Labs Ordered in the Past 30 Days of this Admission       Date and Time Order Name Status Description    10/9/2024  7:26 PM Blood Culture Arm, Left In process         These results will be followed up by hospitalist     Discharge Disposition   Discharged to home  Condition at discharge: Stable    Hospital Course     Expand All Collapse All    [facility]     Hospitalist Progress Note     Patient is a 89-year-old female who lives independently who presented to the ER for evaluation of some subjective fevers and rigors and chills at home.  Also was having issues with her bowel movements was having just liquid coming out and she felt that she was probably impacted.  Upon arrival to the ER she did have a temperature of 100.8 degrees.  Blood pressure was initially 174/70 2 repeat 151/67.  Heart rate 108.  Sats are in 75% room air.  Normally she is on oxygen at home.  Repeat was noted to be 96%.  She had a fairly significant workup in the ER.  White count was elevated 19,900 hemoglobin 12.9 renal function normal LFTs normal lactic acid mild elevation 144 lactic acid was 2.1 procalcitonin 0.05.  Urinalysis was unremarkable.  CT scan of abdomen pelvis also unremarkable except for a fair amount of  stool.  She was felt to have some impaction she was disimpacted in the ER had a significant bowel movement while she was down there.  Blood culture was performed she was empirically given a dose of Rocephin.  She was subsequent admitted because of her fever.  Was not continued on antibiotics.  Does have a significant history of coronary artery disease with previous angioplasty is performed.  Does have a pacemaker in place.  Also does have some vascular disease with carotid artery stenosis however was evaluated by vascular surgery they did not recommend any interventions.  Does have underlying COPD is on oxygen at home also.     ASSESSMENT/PLAN:     1.  Fever: Really do not have a good etiology of this.  Perhaps was due to her impaction.  Urine is clear and CT abdomen pelvis unremarkable.  Did note elevated white count.  Did receive a dose of Rocephin IV for not clear reasons.  Chest x-ray was clear no obvious pulmonary issues.  She is afebrile this morning.  Repeat white count is down to 14,600.  Will see how she is this morning here in general do not feel continue antibiotics is warranted at this point.  She feels well.  Exam is unremarkable.  Will not continue antibiotics at this point.  She has had no further fevers.  No focal source of potential infection.  Feel perhaps this is secondary to her constipation and fecal impaction.     2.  Constipation/fecal impaction.  CT did not show any marked abnormalities.  Did have a very large bowel movement.  She feels markedly better.  Does take MiraLAX twice a day.  Usually eats a lot of fruit.  Will have some Senokot available as needed on top of her usual regimen.  Have another bowel movement formed without problem.  Feels markedly better.     3.  Pacemaker placement: Has been checked and followed no issues.     4.  COPD: Is on oxygen at home.  Current sats are variable 87 to 95%.  She does wear it at home and at times based on notes from the clinic with drop down in the  70% range with exertion come back up easily.  X-ray did not show any acute abnormalities or some chronic interstitial changes.  Denies any shortness of breath at all has not any cough or any signs of exacerbation.     5.  Disposition.  Patient will likely return home.  After breakfast we will get her up ambulate see how she does if does well we will likely be able to send her home today.  She is in agreement with that plan patient was up ambulating without difficulty.  Walked well over 300 feet.  Wishes to be discharged home.          Consultations This Hospital Stay   None    Code Status   Full Code    Time Spent on this Encounter   I, Gerardo Foster MD, personally saw the patient today and spent greater than 30 minutes discharging this patient.       Gerardo Foster MD  [facility]  [address] [place] [id number]  Phone: [phone]  Fax: [phone]  ______________________________________________________________________    Physical Exam   Vital Signs: Temp: 97.4  F (36.3  C) Temp src: Tympanic BP: 134/53 Pulse: 70   Resp: 18 SpO2: 92 % O2 Device: Nasal cannula Oxygen Delivery: 1 LPM  Weight: 117 lbs 4.56 oz  Constitutional: awake, alert, cooperative, no apparent distress, and appears stated age  ENT: Normocephalic, without obvious abnormality, atraumatic, sinuses nontender on palpation, external ears without lesions, oral pharynx with moist mucous membranes, tonsils without erythema or exudates, gums normal and good dentition.  Respiratory: No increased work of breathing, good air exchange, clear to auscultation bilaterally, no crackles or wheezing  Cardiovascular: Normal apical impulse, regular rate and rhythm, normal S1 and S2, no S3 or S4, and no murmur noted  GI: No scars, normal bowel sounds, soft, non-distended, non-tender, no masses palpated, no hepatosplenomegally  Musculoskeletal: There is no redness, warmth, or swelling of the joints.  Full range of motion noted.  Motor strength  is 5 out of 5 all extremities bilaterally.  Tone is normal.       Primary Care Physician   Johan Huggins    Discharge Orders      Reason for your hospital stay    It is a 89 old female who in the ER with complaints of diarrhea feeling impacted.  In the ER CT abdomen pelvis showed no significant abnormalities was mildly disimpacted had a large bowel movement after that.  No clear signs of infection did have elevated white count was given some antibiotics empirically.  Today she feels fine slight white count elevation.  No significant fevers.  Eating fine and without difficulty feels she is ready to go home.  Will follow-up with primary care provider next week.  Will add some Senokot-S to her daily regimen in addition to her usual MiraLAX.     Follow-up and recommended labs and tests     Follow up with primary care provider, Johan Huggins, within 7 days for hospital follow- up.  No follow up labs or test are needed.     Activity    Your activity upon discharge: activity as tolerated     Diet    Follow this diet upon discharge: Current Diet:Orders Placed This Encounter      Combination Diet Regular Diet Adult       Significant Results and Procedures   Most Recent 3 CBC's:  Recent Labs   Lab Test 10/10/24  0514 10/09/24  1917 11/27/23  1438   WBC 14.6* 19.9* 6.4   HGB 10.9* 12.9 9.9*   MCV 93 94 99    236 285     Most Recent 3 BMP's:  Recent Labs   Lab Test 10/10/24  0514 10/09/24  1917 11/27/23  1438    136 142   POTASSIUM 4.2 4.3 3.6   CHLORIDE 106 100 106   CO2 24 21* 23   BUN 17.0 20.3 16.5   CR 0.84 0.83 0.78   ANIONGAP 9 15 13   CATARINA 8.5* 9.4 9.6   GLC 88 144* 122*     Most Recent 2 LFT's:  Recent Labs   Lab Test 10/09/24  1917 11/27/22  0820   AST 30 21   ALT 27 14   ALKPHOS 109 80   BILITOTAL 0.7 0.2     7-Day Micro Results       Collected Updated Procedure Result Status      10/09/2024 1933 10/09/2024 2015 Symptomatic Influenza A/B, RSV, & SARS-CoV2 PCR (COVID-19) Nasopharyngeal  [[id number]]    Swab from Nasopharyngeal    Final result Component Value   Influenza A PCR Negative   Influenza B PCR Negative   RSV PCR Negative   SARS CoV2 PCR Negative   NEGATIVE: SARS-CoV-2 (COVID-19) RNA not detected, presumed negative.            10/09/2024 1917 10/09/2024 1931 Blood Culture Arm, Left [[id number]]   Blood from Arm, Left    In process Component Value   No component results                     Most Recent Urinalysis:  Recent Labs   Lab Test 10/09/24  2026   COLOR Yellow   APPEARANCE Clear   URINEGLC Negative   URINEBILI Negative   URINEKETONE Negative   SG 1.022   UBLD Negative   URINEPH 6.0   PROTEIN 100*   NITRITE Negative   LEUKEST Negative   RBCU 1   WBCU 1     Most Recent ESR & CRP:  Recent Labs   Lab Test 10/09/24  1917 12/21/19  1313   CRP  --  <2.9   CRPI <3.00  --    ,   Results for orders placed or performed during the hospital encounter of 10/09/24   XR Chest Port 1 View    Narrative    PROCEDURE:  XR CHEST PORT 1 VIEW    HISTORY:  fever.     COMPARISON:  2022    FINDINGS:   The cardiac silhouette is normal in size. The pulmonary vasculature is  normal.  There is interstitial thickening seen in both lungs stable  from previous examination. No pleural effusion or pneumothorax.      Impression    IMPRESSION:  Chronic interstitial thickening. No active pulmonary  infiltrates.      SHAAN BAZZI MD         SYSTEM ID:  [id number]   Abd/pelvis CT - IV contrast only TRAUMA / AAA    Narrative    EXAMINATION: CT ABDOMEN PELVIS W CONTRAST, 10/9/2024 8:49 PM    TECHNIQUE:  Helical CT images from the lung bases through the  symphysis pubis were obtained  with IV contrast. Contrast dose: Isovue  370 60ml    COMPARISON: none    HISTORY: fever, abd pain    FINDINGS:    There is a 14 mm diameter nodule seen in the right lower lobe. This  nodule is incompletely visualized. CT scan of the chest is  recommended. The nodule was not seen on a previous examination in  2019.    The liver is free of  masses or biliary ductal enlargement.  calcified  gallstones are seen.    The the spleen and pancreas appear normal.    The adrenal glands are normal.    The right and left kidneys are free of masses or hydronephrosis.    The periaortic lymph nodes are normal in caliber. There is a 3.5 cm  infrarenal abdominal aortic aneurysm.    No intraperitoneal masses or inflammatory changes are noted.    In the pelvis the bladder appears normal. The rectum is distended with  feces Degenerative changes are present in the lumbar spine. 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      Impression    IMPRESSION: 1.4 cm in diameter right lower lobe nodule further  evaluation with CT scan is recommended.    3.5 cm infrarenal abdominal aortic aneurysm.    Cholelithiasis     This report was called to the emergency room at 7:40 AM    SHAAN BAZZI MD         SYSTEM ID:  [id number]       Discharge Medications   Current Discharge Medication List        START taking these medications    Details   senna-docusate (SENOKOT-S/PERICOLACE) 8.6-50 MG tablet Take 1 tablet by mouth daily.  Qty: 30 tablet, Refills: 0    Associated Diagnoses: Fecal impaction (H)           CONTINUE these medications which have NOT CHANGED    Details   amLODIPine (NORVASC) 10 MG tablet Take 10 mg by mouth every morning      aspirin 81 MG EC tablet Take 81 mg by mouth every morning      atorvastatin (LIPITOR) 40 MG tablet Take 40 mg by mouth At Bedtime      Calcium Carb-Cholecalciferol (CALCIUM PLUS VITAMIN D3 PO) Take 1 tablet by mouth every morning      clopidogrel (PLAVIX) 75 MG tablet Take 75 mg by mouth every morning      cyanocobalamin (VITAMIN B-12) 100 MCG tablet Take 1 tablet by mouth every morning      fluticasone-vilanterol (BREO ELLIPTA) 100-25 MCG/INH inhaler Inhale 1 puff into the lungs every morning      isosorbide mononitrate (IMDUR) 30 MG 24 hr tablet Take 30 mg by mouth every morning      losartan (COZAAR) 25 MG tablet Take 12.5 mg by  mouth At Bedtime      metoprolol succinate ER (TOPROL XL) 25 MG 24 hr tablet Take 25 mg by mouth every morning      Multiple Vitamins-Minerals (ICAPS PO) Take 1 capsule by mouth every morning      Polyethyl Glycol-Propyl Glycol (SYSTANE OP) Place 1-2 drops into both eyes every morning.      polyethylene glycol (MIRALAX) 17 GM/Dose powder Take 1 Capful by mouth 2 times daily. Mix with 8 ounces of liquid.      SV IRON 325 (65 Fe) MG tablet Take 1 tablet by mouth daily (with breakfast).           Allergies   Allergies   Allergen Reactions    Niacin Rash

## 2024-10-10 NOTE — PLAN OF CARE
Goal Outcome Evaluation:      Plan of Care Reviewed With: patient    Overall Patient Progress: improvingOverall Patient Progress: improving       Patient discharged at 2:27 PM via wheel chair accompanied by Daughter In Law and staff. Prescriptions sent to patients preferred pharmacy. All belongings sent with patient.     Discharge instructions reviewed with Patient and Daughter In Law. Listed belongings gathered and returned to patient.    Patient discharged to Home.       [facility]  Follow up appointment made:  Yes  Home medications returned to patient: N/A  Patient reports pain was well managed at discharge: Yes

## 2024-10-10 NOTE — DISCHARGE INSTRUCTIONS
YOU HAVE A FOLLOW UP APPOINTMENT AT THE [facility]-WEDNESDAY OCTOBER 16TH AT 9:20 IN THE MORNING WITH DR ALMARAZ. THEY CAN BE REACHED [phone]

## 2024-10-10 NOTE — H&P
[facility]    History and Physical - Hospitalist Service       Date of Admission:  10/9/2024    HOSPITALIST TELEMED ADMISSION H&P    Service Date : 10/10/2024     The RN or tech on duty in the ED is assisting me today with this patient.    chief complaint: Patient came in for subjective fever and fecal impaction    History of Present Illness:  Frances Gilliam is a 89 year old female presenting to ED today to the emergency department with complaints of low-grade fever along with chills and rigors this afternoon.  She also tells me that she felt that she had fecal impaction and basically just running liquid bowel movements.  Denies any nausea vomiting    Emergency Room Course -     Past Medical History  Past Medical History:   Diagnosis Date    Age-related macular degeneration     Aortic valve sclerosis     CAD (coronary artery disease)     Essential thrombocytosis (H)     GI hemorrhage     History of MI (myocardial infarction)     Hypertension     Iron deficiency anemia     Mixed hyperlipidemia     Peripheral vascular disease (H)     Vitamin B 12 deficiency     Vitamin D deficiency       Patient Active Problem List   Diagnosis    Anemia    Anxiety about health    Asthma    Coronary atherosclerosis    Disorder of bone and cartilage    Hyperlipidemia    Hypertension    Lumbago    Macular degeneration    Occlusion and stenosis of carotid artery without mention of cerebral infarction    Panic disorder    Sacroiliitis, not elsewhere classified (H)    Closed fracture of multiple ribs of left side    Syncope    Vitamin D deficiency    Vitamin B 12 deficiency    Peripheral vascular disease (H)    Iron deficiency anemia    History of MI (myocardial infarction)    GI hemorrhage    Essential thrombocytosis (H)    CAD (coronary artery disease)    Aortic valve sclerosis    Multiple rib fractures    Aneurysm of infrarenal abdominal aorta (H)    Bilateral carotid artery stenosis    Mild intermittent asthma, uncomplicated     Pacemaker    Peptic ulcer disease    Other hyperlipidemia    Coronary artery disease of native artery of native heart with stable angina pectoris (H)    Essential hypertension    Pulmonary hypertension (H)    Hypoxia    LBBB (left bundle branch block)    Generalized muscle weakness    Cardiac pacemaker in situ    Dizziness    Vascular disease    Oxygen dependent    At risk for falls    Febrile illness    Fecal impaction (H)         Past Surgical History  Past Surgical History:   Procedure Laterality Date    ESOPHAGOSCOPY, GASTROSCOPY, DUODENOSCOPY (EGD), COMBINED N/A 12/27/2017    Procedure: COMBINED ESOPHAGOSCOPY, GASTROSCOPY, DUODENOSCOPY (EGD);  UPPER ENDOSCOPY WITH BIOPSIES, ENTEROSCOPY WITH  SMALL BOWEL ARGON PLASMA COAGULATION;  Surgeon: Robin Vaughn MD;  Location: [facility]    DE PATIENT HAS A CORONARY ARTERY STENT        Social History  Frances  reports that she quit smoking about 56 years ago. Her smoking use included cigarettes. She has never used smokeless tobacco. She reports current alcohol use. She reports that she does not use drugs.    Family History  Frances's family history includes Heart Disease in her father.    Home Medications  Current Outpatient Medications   Medication Instructions    amLODIPine (NORVASC) 10 mg, Oral, EVERY MORNING    aspirin 81 mg, Oral, EVERY MORNING    atorvastatin (LIPITOR) 40 mg, Oral, AT BEDTIME    Calcium Carb-Cholecalciferol (CALCIUM PLUS VITAMIN D3 PO) 1 tablet, Oral, EVERY MORNING    cetirizine (ZYRTEC) 10 mg, Oral, DAILY    clopidogrel (PLAVIX) 75 mg, Oral, EVERY MORNING    cyanocobalamin (VITAMIN B-12) 100 MCG tablet 1 tablet, Oral, EVERY MORNING    fluticasone-vilanterol (BREO ELLIPTA) 100-25 MCG/INH inhaler 1 puff, Inhalation, EVERY MORNING    isosorbide mononitrate (IMDUR) 30 mg, Oral, EVERY MORNING    losartan (COZAAR) 12.5 mg, Oral, AT BEDTIME    metoprolol succinate ER (TOPROL XL) 25 mg, Oral, EVERY MORNING    Multiple Vitamins-Minerals (ICAPS PO) 1 capsule,  "Oral, EVERY MORNING    SV IRON 325 (65 Fe) MG tablet 1 tablet, Oral, DAILY WITH BREAKFAST       Allergies  Allergies   Allergen Reactions    Niacin Rash        10 pt ROS neg except as noted in HPI    Physical Exam:  I performed all aspects of the physical examination via Telemedicine associated with two way audio and video communication.    Vital Signs: Blood pressure 144/60, pulse 84, temperature 99  F (37.2  C), temperature source Tympanic, resp. rate 16, height 1.499 m (4' 11\"), weight 53.2 kg (117 lb 4.6 oz), SpO2 95%.    Physical Exam    Gen:  Well-developed, well-nourished, in no acute distress, lying semi-supine in hospital stretcher  HEENT:  Anicteric sclera, PER, hearing intact to voice  Resp:  No accessory muscle use, breath sounds clear; no wheezes no rales no rhonchi  Card:  No murmur, normal S1, S2   Abd:  Soft per RN exam, no TTP, non-distended, normoactive bowel sounds are present  Musc:  Normal strength and movement of the major muscle groups without obvious deformity  Psych:  Good insight, oriented to person, place and time, not anxious, not agitated    DATA  Labs:  I have personally reviewed the following studies:  Recent Labs   Lab 10/09/24  1917   POTASSIUM 4.3   CHLORIDE 100   CO2 21*   BUN 20.3   ALBUMIN 4.3   ALKPHOS 109   AST 30   ALT 27      Recent Labs   Lab 10/09/24  1917   WBC 19.9*   HGB 12.9   HCT 39.1          Imaging: ER imaging reviewed    ASSESSMENT/PLAN:     Fever along with elevated white count-seems like patient did have a large bowel movement in the emergency department on the medical floor after she was manually disimpacted.  Also has an elevated white count along with low-grade fever and chills.  She got a dose of Rocephin in the emergency department but I do not see any conclusive signs of infection on further testing CT of the abdomen pelvis is pending as far as I can see but seems like the ER attending did read it and it probably did not really show much.  I would " wait for the official read to come back.  Until then I will continue Rocephin and then that can be discontinued tomorrow if we do not necessarily find any obvious source of infection blood cultures are pending at this time.  Patient's COVID test/RSV and influenza is negative.  Chest x-ray is unremarkable    Clinically Significant Risk Factors Present on Admission                 # Drug Induced Platelet Defect: home medication list includes an antiplatelet medication   # Hypertension: Noted on problem list             # Asthma: noted on problem list          Misc  DVT prophylaxis: SCDs  Code Status: Full code     The plan was discussed with - Patient  Time: 30 min

## 2024-10-10 NOTE — MEDICATION SCRIBE - ADMISSION MEDICATION HISTORY
Medication Scribe Admission Medication History    Admission medication history is complete. The information provided in this note is only as accurate as the sources available at the time of the update.    Information Source(s): Patient[facility], and [facility]  via in-person    Pertinent Information:   Patient manages her own medications at home and is a good historian.   Patient reports she has been using Miralax twice daily.     Changes made to PTA medication list:  Added: Miralax, Systane  Deleted: Cetirizine - no longer taking  Changed: None    Allergies reviewed with patient and updates made in EHR: yes    Medication History Completed By: Samantha Wasserman 10/10/2024 8:02 AM    PTA Med List   Medication Sig Last Dose    amLODIPine (NORVASC) 10 MG tablet Take 10 mg by mouth every morning 10/9/2024 at 0900    aspirin 81 MG EC tablet Take 81 mg by mouth every morning 10/9/2024 at 0900    atorvastatin (LIPITOR) 40 MG tablet Take 40 mg by mouth At Bedtime 10/8/2024 at 2200    Calcium Carb-Cholecalciferol (CALCIUM PLUS VITAMIN D3 PO) Take 1 tablet by mouth every morning 10/9/2024 at 0900    clopidogrel (PLAVIX) 75 MG tablet Take 75 mg by mouth every morning 10/9/2024 at 0900    cyanocobalamin (VITAMIN B-12) 100 MCG tablet Take 1 tablet by mouth every morning 10/9/2024 at 0900    fluticasone-vilanterol (BREO ELLIPTA) 100-25 MCG/INH inhaler Inhale 1 puff into the lungs every morning 10/9/2024 at 0900    isosorbide mononitrate (IMDUR) 30 MG 24 hr tablet Take 30 mg by mouth every morning 10/9/2024 at 0900    losartan (COZAAR) 25 MG tablet Take 12.5 mg by mouth At Bedtime 10/8/2024 at 2200    metoprolol succinate ER (TOPROL XL) 25 MG 24 hr tablet Take 25 mg by mouth every morning 10/9/2024 at 0900    Multiple Vitamins-Minerals (ICAPS PO) Take 1 capsule by mouth every morning 10/9/2024 at 0900    Polyethyl Glycol-Propyl Glycol (SYSTANE OP) Place 1-2 drops into both eyes every morning. 10/9/2024 at 1100     polyethylene glycol (MIRALAX) 17 GM/Dose powder Take 1 Capful by mouth 2 times daily. Mix with 8 ounces of liquid. 10/9/2024 at NOON    SV IRON 325 (65 Fe) MG tablet Take 1 tablet by mouth daily (with breakfast). 10/9/2024 at 0900

## 2024-10-10 NOTE — ED PROVIDER NOTES
History     Chief Complaint   Patient presents with    Fecal Impaction    Fever     The history is provided by the patient.   Fever  Temp source:  Subjective  Severity:  Mild  Onset quality:  Gradual  Duration:  6 hours  Timing:  Constant  Progression:  Unchanged  Chronicity:  New  Associated symptoms: chills    Associated symptoms: no chest pain, no confusion, no cough, no dysuria, no headaches, no myalgias, no nausea, no rash and no vomiting          Allergies:  Allergies   Allergen Reactions    Niacin Rash       Problem List:    Patient Active Problem List    Diagnosis Date Noted    Febrile illness 10/09/2024     Priority: Medium    Fecal impaction (H) 10/09/2024     Priority: Medium    Cardiac pacemaker in situ 06/01/2023     Priority: Medium    Dizziness 06/01/2023     Priority: Medium    Vascular disease 06/01/2023     Priority: Medium    Oxygen dependent 06/01/2023     Priority: Medium    At risk for falls 06/01/2023     Priority: Medium    Pulmonary hypertension (H) 11/25/2022     Priority: Medium    Hypoxia 11/25/2022     Priority: Medium    LBBB (left bundle branch block) 11/25/2022     Priority: Medium    Generalized muscle weakness 11/25/2022     Priority: Medium    Pacemaker 02/16/2021     Priority: Medium     Formatting of this note might be different from the original.  Second degree HB, syncope. Dr. Mohr implanted Pacemaker 2/14/2021  Device Information:   Generator: BS Accolade DR TERRAZAS EL [id number] (SN: [id number] - Feb, 14 2021)  RA Lead: BS Ingevity+ [id number] (SN: [id number] - Feb, 14 2021)   RV Lead: BS Ingevity+ [id number] (SN: [id number] - Feb, 14 2021) RV basal septum      Aneurysm of infrarenal abdominal aorta (H) 02/13/2021     Priority: Medium    Bilateral carotid artery stenosis 02/04/2020     Priority: Medium    Other hyperlipidemia 02/04/2020     Priority: Medium    Coronary artery disease of native artery of native heart with stable angina pectoris (H) 02/04/2020     Priority: Medium     Formatting of this  note might be different from the original.  99% stenosis to 1st RPL treated w/ PTCA, too small to stent.    Residual 50% LAD stenosis FFR pre 0.90, post 0.89, not stented      Essential hypertension 02/04/2020     Priority: Medium    Coronary atherosclerosis 12/21/2019     Priority: Medium    Disorder of bone and cartilage 12/21/2019     Priority: Medium    Hyperlipidemia 12/21/2019     Priority: Medium    Hypertension 12/21/2019     Priority: Medium    Macular degeneration 12/21/2019     Priority: Medium    Occlusion and stenosis of carotid artery without mention of cerebral infarction 12/21/2019     Priority: Medium     Overview:   with 50-75% stenoses of left, right internal carotid arteries, documented by   US 5/15/02 and 06/16/04      Closed fracture of multiple ribs of left side 12/21/2019     Priority: Medium    Syncope 12/21/2019     Priority: Medium    Multiple rib fractures 12/21/2019     Priority: Medium    Vitamin D deficiency      Priority: Medium    Vitamin B 12 deficiency      Priority: Medium    Peripheral vascular disease (H)      Priority: Medium    Iron deficiency anemia      Priority: Medium    History of MI (myocardial infarction)      Priority: Medium    GI hemorrhage      Priority: Medium    Essential thrombocytosis (H)      Priority: Medium    CAD (coronary artery disease)      Priority: Medium    Aortic valve sclerosis      Priority: Medium    Anemia 05/12/2016     Priority: Medium    Peptic ulcer disease 11/12/2014     Priority: Medium    Anxiety about health 11/04/2014     Priority: Medium    Panic disorder 11/04/2014     Priority: Medium    Asthma 10/11/2011     Priority: Medium    Mild intermittent asthma, uncomplicated 10/11/2011     Priority: Medium    Lumbago 09/06/2006     Priority: Medium     IMO Update 10/11      Sacroiliitis, not elsewhere classified (H) 09/06/2006     Priority: Medium     IMO Update 10/11          Past Medical History:    Past Medical History:   Diagnosis Date     Age-related macular degeneration     Aortic valve sclerosis     CAD (coronary artery disease)     Essential thrombocytosis (H)     GI hemorrhage     History of MI (myocardial infarction)     Hypertension     Iron deficiency anemia     Mixed hyperlipidemia     Peripheral vascular disease (H)     Vitamin B 12 deficiency     Vitamin D deficiency        Past Surgical History:    Past Surgical History:   Procedure Laterality Date    ESOPHAGOSCOPY, GASTROSCOPY, DUODENOSCOPY (EGD), COMBINED N/A 2017    Procedure: COMBINED ESOPHAGOSCOPY, GASTROSCOPY, DUODENOSCOPY (EGD);  UPPER ENDOSCOPY WITH BIOPSIES, ENTEROSCOPY WITH  SMALL BOWEL ARGON PLASMA COAGULATION;  Surgeon: Robin Vaughn MD;  Location: [facility]    MD PATIENT HAS A CORONARY ARTERY STENT         Family History:    Family History   Problem Relation Age of Onset    Heart Disease Father        Social History:  Marital Status:   [5]  Social History     Tobacco Use    Smoking status: Former     Current packs/day: 0.00     Types: Cigarettes     Quit date:      Years since quittin.8    Smokeless tobacco: Never   Substance Use Topics    Alcohol use: Yes     Comment: occasionally    Drug use: Never        Medications:    amLODIPine (NORVASC) 10 MG tablet  aspirin 81 MG EC tablet  atorvastatin (LIPITOR) 40 MG tablet  Calcium Carb-Cholecalciferol (CALCIUM PLUS VITAMIN D3 PO)  cetirizine (ZYRTEC) 10 MG tablet  clopidogrel (PLAVIX) 75 MG tablet  cyanocobalamin (VITAMIN B-12) 100 MCG tablet  fluticasone-vilanterol (BREO ELLIPTA) 100-25 MCG/INH inhaler  isosorbide mononitrate (IMDUR) 30 MG 24 hr tablet  losartan (COZAAR) 25 MG tablet  metoprolol succinate ER (TOPROL XL) 25 MG 24 hr tablet  Multiple Vitamins-Minerals (ICAPS PO)          Review of Systems   Constitutional:  Positive for chills, fatigue and fever. Negative for diaphoresis.   HENT:  Negative for voice change.    Eyes:  Negative for visual disturbance.   Respiratory:  Negative for cough, chest  tightness, shortness of breath and wheezing.    Cardiovascular:  Negative for chest pain, palpitations and leg swelling.   Gastrointestinal:  Positive for constipation and rectal pain. Negative for abdominal distention, abdominal pain, anal bleeding, blood in stool, nausea and vomiting.   Genitourinary:  Negative for decreased urine volume, dysuria, flank pain and hematuria.   Musculoskeletal:  Negative for arthralgias, back pain, gait problem, myalgias, neck pain and neck stiffness.   Skin:  Negative for color change, pallor, rash and wound.   Neurological:  Negative for dizziness, syncope, light-headedness, numbness and headaches.   Psychiatric/Behavioral:  Negative for confusion and suicidal ideas.        Physical Exam   BP: 174/72  Pulse: 108  Temp: (!) 100.8  F (38.2  C)  Resp: 24  SpO2: (!) 75 %      Physical Exam  Vitals and nursing note reviewed.   Constitutional:       Appearance: She is well-developed.   HENT:      Head: Normocephalic and atraumatic.      Mouth/Throat:      Pharynx: No oropharyngeal exudate.   Eyes:      Conjunctiva/sclera: Conjunctivae normal.      Pupils: Pupils are equal, round, and reactive to light.   Neck:      Thyroid: No thyromegaly.      Vascular: No JVD.      Trachea: No tracheal deviation.   Cardiovascular:      Rate and Rhythm: Normal rate and regular rhythm.      Heart sounds: Normal heart sounds. No murmur heard.     No friction rub. No gallop.   Pulmonary:      Effort: Pulmonary effort is normal. No respiratory distress.      Breath sounds: Normal breath sounds. No stridor. No wheezing or rales.   Chest:      Chest wall: No tenderness.   Abdominal:      General: Bowel sounds are normal. There is no distension.      Palpations: Abdomen is soft. There is no mass.      Tenderness: There is no abdominal tenderness. There is no guarding or rebound.   Genitourinary:     Rectum: Tenderness present.      Comments: Fecal impaction  Musculoskeletal:         General: No tenderness.  Normal range of motion.      Cervical back: Normal range of motion and neck supple.   Lymphadenopathy:      Cervical: No cervical adenopathy.   Skin:     General: Skin is warm and dry.      Coloration: Skin is not pale.      Findings: No erythema or rash.   Neurological:      Mental Status: She is alert and oriented to person, place, and time.   Psychiatric:         Behavior: Behavior normal.         ED Course        Procedures                  Results for orders placed or performed during the hospital encounter of 10/09/24 (from the past 24 hour(s))   [facility] Draw    Narrative    The following orders were created for panel order [facility] Draw.  Procedure                               Abnormality         Status                     ---------                               -----------         ------                     Extra Blood Culture Bottle[[id number]]                                                  Extra Blue Top Tube[[id number]]                              Final result               Extra Red Top Tube[[id number]]                               Final result               Extra Green Top (Lithium...[[id number]]                      Final result               Extra Green Top (Lithium...[[id number]]                                                 Extra Purple Top Tube[[id number]]                            Final result                 Please view results for these tests on the individual orders.   Extra Blue Top Tube   Result Value Ref Range    Hold Specimen JIC    Extra Red Top Tube   Result Value Ref Range    Hold Specimen JIC    Extra Green Top (Lithium Heparin) Tube   Result Value Ref Range    Hold Specimen JIC    Extra Purple Top Tube   Result Value Ref Range    Hold Specimen JIC    Comprehensive metabolic panel   Result Value Ref Range    Sodium 136 135 - 145 mmol/L    Potassium 4.3 3.4 - 5.3 mmol/L    Carbon Dioxide (CO2) 21 (L) 22 - 29 mmol/L    Anion Gap 15 7 - 15 mmol/L    Urea Nitrogen 20.3 8.0 - 23.0 mg/dL     Creatinine 0.83 0.51 - 0.95 mg/dL    GFR Estimate 67 >60 mL/min/1.73m2    Calcium 9.4 8.8 - 10.4 mg/dL    Chloride 100 98 - 107 mmol/L    Glucose 144 (H) 70 - 99 mg/dL    Alkaline Phosphatase 109 40 - 150 U/L    AST 30 0 - 45 U/L    ALT 27 0 - 50 U/L    Protein Total 6.8 6.4 - 8.3 g/dL    Albumin 4.3 3.5 - 5.2 g/dL    Bilirubin Total 0.7 <=1.2 mg/dL   CBC with Platelets & Differential    Narrative    The following orders were created for panel order CBC with Platelets & Differential.  Procedure                               Abnormality         Status                     ---------                               -----------         ------                     CBC with platelets and d...[[id number]]  Abnormal            Final result                 Please view results for these tests on the individual orders.   CRP inflammation   Result Value Ref Range    CRP Inflammation <3.00 <5.00 mg/L   Lactic acid whole blood   Result Value Ref Range    Lactic Acid 2.1 (H) 0.7 - 2.0 mmol/L   Procalcitonin   Result Value Ref Range    Procalcitonin 0.05 <0.50 ng/mL   CBC with platelets and differential   Result Value Ref Range    WBC Count 19.9 (H) 4.0 - 11.0 10e3/uL    RBC Count 4.18 3.80 - 5.20 10e6/uL    Hemoglobin 12.9 11.7 - 15.7 g/dL    Hematocrit 39.1 35.0 - 47.0 %    MCV 94 78 - 100 fL    MCH 30.9 26.5 - 33.0 pg    MCHC 33.0 31.5 - 36.5 g/dL    RDW 13.2 10.0 - 15.0 %    Platelet Count 236 150 - 450 10e3/uL    % Neutrophils 93 %    % Lymphocytes 3 %    % Monocytes 4 %    % Eosinophils 0 %    % Basophils 0 %    % Immature Granulocytes 1 %    NRBCs per 100 WBC 0 <1 /100    Absolute Neutrophils 18.4 (H) 1.6 - 8.3 10e3/uL    Absolute Lymphocytes 0.6 (L) 0.8 - 5.3 10e3/uL    Absolute Monocytes 0.7 0.0 - 1.3 10e3/uL    Absolute Eosinophils 0.0 0.0 - 0.7 10e3/uL    Absolute Basophils 0.0 0.0 - 0.2 10e3/uL    Absolute Immature Granulocytes 0.1 <=0.4 10e3/uL    Absolute NRBCs 0.0 10e3/uL   Symptomatic Influenza A/B, RSV, & SARS-CoV2 PCR  (COVID-19) Nasopharyngeal    Specimen: Nasopharyngeal; Swab   Result Value Ref Range    Influenza A PCR Negative Negative    Influenza B PCR Negative Negative    RSV PCR Negative Negative    SARS CoV2 PCR Negative Negative    Narrative    Testing was performed using the Xpert Xpress CoV2/Flu/RSV Assay on the [employer] GeneXpert Instrument. This test should be ordered for the detection of SARS-CoV2, influenza, and RSV viruses in individuals with signs and symptoms of respiratory tract infection. This test is for in vitro diagnostic use under the [place] FDA for laboratories certified under CLIA to perform high or moderate complexity testing. This test has been [place] FDA cleared. A negative result does not rule out the presence of PCR inhibitors in the specimen or target RNA in concentration below the limit of detection for the assay. If only one viral target is positive but coinfection with multiple targets is suspected, the sample should be re-tested with another FDA cleared, approved, or authorized test, if coninfection would change clinical management. This test was validated by the [facility]. These laboratories are certified under the Clinical Laboratory Improvement Amendments of 1988 (CLIA-88) as qualified to perfom high complexity laboratory testing.   XR Chest Port 1 View    Narrative    PROCEDURE:  XR CHEST PORT 1 VIEW    HISTORY:  fever.     COMPARISON:  2022    FINDINGS:   The cardiac silhouette is normal in size. The pulmonary vasculature is  normal.  There is interstitial thickening seen in both lungs stable  from previous examination. No pleural effusion or pneumothorax.      Impression    IMPRESSION:  Chronic interstitial thickening. No active pulmonary  infiltrates.      SHAAN BAZZI MD         SYSTEM ID:  [id number]   UA Macroscopic with reflex to Microscopic and Culture    Specimen: Urine, Catheter   Result Value Ref Range    Color Urine Yellow Colorless, Straw, Light Yellow, Yellow     Appearance Urine Clear Clear    Glucose Urine Negative Negative mg/dL    Bilirubin Urine Negative Negative    Ketones Urine Negative Negative mg/dL    Specific Gravity Urine 1.022 1.003 - 1.035    Blood Urine Negative Negative    pH Urine 6.0 4.7 - 8.0    Protein Albumin Urine 100 (A) Negative mg/dL    Urobilinogen Urine Normal Normal, 2.0 mg/dL    Nitrite Urine Negative Negative    Leukocyte Esterase Urine Negative Negative    Budding Yeast Urine Few (A) None Seen /HPF    Mucus Urine Present (A) None Seen /LPF    RBC Urine 1 <=2 /HPF    WBC Urine 1 <=5 /HPF    Squamous Epithelials Urine 1 <=1 /HPF    Hyaline Casts Urine 2 <=2 /LPF    Narrative    Urine Culture not indicated       Medications   sodium phosphate (FLEET ENEMA) 1 enema (has no administration in time range)   sodium chloride 0.9% BOLUS 500 mL (0 mLs Intravenous Stopped 10/9/24 2012)   cefTRIAXone IN D5W (ROCEPHIN) intermittent infusion 2 g (0 g Intravenous Stopped 10/9/24 2116)   iopamidol (ISOVUE-370) solution 60 mL (60 mLs Intravenous $Given 10/9/24 2037)   sodium chloride (PF) 0.9% PF flush 50 mL (50 mLs Intravenous $Given 10/9/24 2037)   bisacodyl (DULCOLAX) suppository 10 mg (10 mg Rectal $Given 10/9/24 2119)       Assessments & Plan (with Medical Decision Making)   Fecal impaction , febrile illness    Sepsis workup was done  Labs reviewed  Elevated WBC  CXR, CT abd: no clear source of infection    IV rocephin given   Manual disimpaction was done , pt had large bowel movement    Febrile illness in high risk patient with early signs of sepsis,  need for observation   I spoke to Dr. Garibay, accepted for admission    I have reviewed the nursing notes.    I have reviewed the findings, diagnosis, plan and need for follow up with the patient.          New Prescriptions    No medications on file       Final diagnoses:   Fecal impaction (H)   Febrile illness       10/9/2024   [facility]       Asad Layne MD  10/09/24 [id number]

## 2024-10-10 NOTE — PLAN OF CARE
Checked morning VS - see charted assessment. Message to MD about diastolic BP. MD ok'd norvasc, toprol, and imdur as scheduled. Other VSS    Prior to breakfast patient had BM - 3 small formed stools. No adverse s/s noted.

## 2024-10-15 LAB — BACTERIA BLD CULT: NO GROWTH

## (undated) DEVICE — CANISTER-SUCTION 2000CC

## (undated) DEVICE — CAUTERY PAD-POLYHESIVE II ADULT

## (undated) DEVICE — FORCEP-COLON BIOPSY LARGE W/NEEDLE 240CM

## (undated) DEVICE — LUBRICANT JELLY 2OZ. TUBE

## (undated) DEVICE — IRRIGATION-H2O 1000ML

## (undated) DEVICE — FORCEP-COLON BIOPSY STD W/NEEDLE 160CM

## (undated) DEVICE — MOUTHPIECE W/GUARD FOR ENDOSCOPY

## (undated) DEVICE — PROBE-ERBE APC 2.3MM 7FR. CIRCUMFERENTIAL

## (undated) DEVICE — TUBING-SUCTION 20FT

## (undated) RX ORDER — PROPOFOL 10 MG/ML
INJECTION, EMULSION INTRAVENOUS
Status: DISPENSED
Start: 2017-12-27